# Patient Record
Sex: FEMALE | Race: WHITE | Employment: UNEMPLOYED | URBAN - NONMETROPOLITAN AREA
[De-identification: names, ages, dates, MRNs, and addresses within clinical notes are randomized per-mention and may not be internally consistent; named-entity substitution may affect disease eponyms.]

---

## 2017-03-22 ENCOUNTER — OFFICE VISIT (OUTPATIENT)
Dept: ONCOLOGY | Age: 72
End: 2017-03-22
Payer: MEDICARE

## 2017-03-22 VITALS
HEIGHT: 64 IN | DIASTOLIC BLOOD PRESSURE: 70 MMHG | WEIGHT: 194.2 LBS | BODY MASS INDEX: 33.16 KG/M2 | HEART RATE: 76 BPM | SYSTOLIC BLOOD PRESSURE: 116 MMHG | TEMPERATURE: 97.1 F

## 2017-03-22 DIAGNOSIS — C50.912 MALIGNANT NEOPLASM OF LEFT FEMALE BREAST, UNSPECIFIED SITE OF BREAST: Primary | ICD-10-CM

## 2017-03-22 DIAGNOSIS — C50.212 MALIGNANT NEOPLASM OF UPPER-INNER QUADRANT OF LEFT FEMALE BREAST (HCC): Primary | ICD-10-CM

## 2017-03-22 DIAGNOSIS — D47.2 MGUS (MONOCLONAL GAMMOPATHY OF UNKNOWN SIGNIFICANCE): ICD-10-CM

## 2017-03-22 PROCEDURE — 99214 OFFICE O/P EST MOD 30 MIN: CPT | Performed by: INTERNAL MEDICINE

## 2017-04-07 ENCOUNTER — HOSPITAL ENCOUNTER (OUTPATIENT)
Age: 72
Setting detail: SPECIMEN
Discharge: HOME | End: 2017-04-07
Payer: MEDICARE

## 2017-04-07 LAB
FREE KAPPA/LAMBDA RATIO: 0.45 (ref 0.26–1.65)
IGA: 120 MG/DL (ref 70–400)
IGG: 1400 MG/DL (ref 700–1600)
IGM: 127 MG/DL (ref 40–230)
KAPPA FREE LIGHT CHAINS QNT: 1.61 MG/DL (ref 0.37–1.94)
LAMBDA FREE LIGHT CHAINS QNT: 3.58 MG/DL (ref 0.57–2.63)

## 2017-04-10 LAB
ALBUMIN (CALCULATED): 4.9 G/DL (ref 3.2–5.2)
ALBUMIN PERCENT: 65 % (ref 45–65)
ALPHA 1 PERCENT: 2 % (ref 3–6)
ALPHA 2 PERCENT: 10 % (ref 6–13)
ALPHA-1-GLOBULIN: 0.1 G/DL (ref 0.1–0.4)
ALPHA-2-GLOBULIN: 0.7 G/DL (ref 0.5–0.9)
BETA GLOBULIN: 0.8 G/DL (ref 0.7–1.4)
BETA PERCENT: 10 % (ref 11–19)
GAMMA GLOBULIN %: 14 % (ref 9–20)
GAMMA GLOBULIN: 1 G/DL (ref 0.5–1.5)
PATHOLOGIST: ABNORMAL
PROTEIN ELECTROPHORESIS, SERUM: ABNORMAL
TOTAL PROT. SUM,%: 101 % (ref 98–102)
TOTAL PROT. SUM: 7.5 G/DL (ref 6.3–8.2)
TOTAL PROTEIN: 7.5 G/DL (ref 6.4–8.3)

## 2017-04-14 ENCOUNTER — OFFICE VISIT (OUTPATIENT)
Dept: PRIMARY CARE CLINIC | Age: 72
End: 2017-04-14
Payer: MEDICARE

## 2017-04-14 VITALS
TEMPERATURE: 97.5 F | WEIGHT: 195.8 LBS | HEART RATE: 74 BPM | HEIGHT: 63 IN | SYSTOLIC BLOOD PRESSURE: 112 MMHG | DIASTOLIC BLOOD PRESSURE: 60 MMHG | BODY MASS INDEX: 34.69 KG/M2

## 2017-04-14 DIAGNOSIS — L08.9 TOE INFECTION: ICD-10-CM

## 2017-04-14 DIAGNOSIS — L03.031 CELLULITIS OF TOE OF RIGHT FOOT: Primary | ICD-10-CM

## 2017-04-14 PROCEDURE — 99213 OFFICE O/P EST LOW 20 MIN: CPT | Performed by: NURSE PRACTITIONER

## 2017-04-14 RX ORDER — DOXYCYCLINE HYCLATE 100 MG/1
100 CAPSULE ORAL 2 TIMES DAILY
Qty: 20 CAPSULE | Refills: 0 | Status: SHIPPED | OUTPATIENT
Start: 2017-04-14 | End: 2017-04-27 | Stop reason: SDUPTHER

## 2017-04-14 ASSESSMENT — ENCOUNTER SYMPTOMS
WHEEZING: 0
COUGH: 0
SHORTNESS OF BREATH: 0

## 2017-04-27 ENCOUNTER — OFFICE VISIT (OUTPATIENT)
Dept: ONCOLOGY | Age: 72
End: 2017-04-27
Payer: MEDICARE

## 2017-04-27 VITALS
BODY MASS INDEX: 33.73 KG/M2 | WEIGHT: 197.6 LBS | SYSTOLIC BLOOD PRESSURE: 116 MMHG | TEMPERATURE: 97.1 F | DIASTOLIC BLOOD PRESSURE: 68 MMHG | HEIGHT: 64 IN | HEART RATE: 80 BPM

## 2017-04-27 DIAGNOSIS — D47.2 MGUS (MONOCLONAL GAMMOPATHY OF UNKNOWN SIGNIFICANCE): Primary | ICD-10-CM

## 2017-04-27 DIAGNOSIS — C50.912 MALIGNANT NEOPLASM OF LEFT FEMALE BREAST, UNSPECIFIED SITE OF BREAST: ICD-10-CM

## 2017-04-27 PROCEDURE — 99214 OFFICE O/P EST MOD 30 MIN: CPT | Performed by: INTERNAL MEDICINE

## 2017-04-27 RX ORDER — DOXYCYCLINE HYCLATE 100 MG/1
100 CAPSULE ORAL 2 TIMES DAILY
Qty: 20 CAPSULE | Refills: 0 | Status: SHIPPED | OUTPATIENT
Start: 2017-04-27 | End: 2017-05-07

## 2017-05-09 ENCOUNTER — OFFICE VISIT (OUTPATIENT)
Dept: INTERNAL MEDICINE | Age: 72
End: 2017-05-09
Payer: MEDICARE

## 2017-05-09 VITALS
WEIGHT: 197.4 LBS | HEART RATE: 56 BPM | HEIGHT: 64 IN | BODY MASS INDEX: 33.7 KG/M2 | DIASTOLIC BLOOD PRESSURE: 58 MMHG | SYSTOLIC BLOOD PRESSURE: 112 MMHG

## 2017-05-09 DIAGNOSIS — R73.01 IFG (IMPAIRED FASTING GLUCOSE): ICD-10-CM

## 2017-05-09 DIAGNOSIS — E78.5 DYSLIPIDEMIA: ICD-10-CM

## 2017-05-09 DIAGNOSIS — E55.9 VITAMIN D DEFICIENCY: ICD-10-CM

## 2017-05-09 DIAGNOSIS — D47.2 MGUS (MONOCLONAL GAMMOPATHY OF UNKNOWN SIGNIFICANCE): ICD-10-CM

## 2017-05-09 DIAGNOSIS — F51.01 PRIMARY INSOMNIA: ICD-10-CM

## 2017-05-09 DIAGNOSIS — F32.9 CHRONIC MAJOR DEPRESSIVE DISORDER: ICD-10-CM

## 2017-05-09 DIAGNOSIS — L97.511 TOE ULCER, RIGHT, LIMITED TO BREAKDOWN OF SKIN (HCC): Primary | ICD-10-CM

## 2017-05-09 DIAGNOSIS — M85.80 OSTEOPENIA: ICD-10-CM

## 2017-05-09 DIAGNOSIS — C50.212 MALIGNANT NEOPLASM OF UPPER-INNER QUADRANT OF LEFT FEMALE BREAST (HCC): ICD-10-CM

## 2017-05-09 PROCEDURE — G8510 SCR DEP NEG, NO PLAN REQD: HCPCS | Performed by: INTERNAL MEDICINE

## 2017-05-09 PROCEDURE — 99214 OFFICE O/P EST MOD 30 MIN: CPT | Performed by: INTERNAL MEDICINE

## 2017-05-09 RX ORDER — ZALEPLON 10 MG/1
10 CAPSULE ORAL NIGHTLY PRN
Qty: 90 CAPSULE | Refills: 1 | Status: SHIPPED | OUTPATIENT
Start: 2017-05-09 | End: 2017-11-15 | Stop reason: SDUPTHER

## 2017-05-09 ASSESSMENT — PATIENT HEALTH QUESTIONNAIRE - PHQ9
SUM OF ALL RESPONSES TO PHQ9 QUESTIONS 1 & 2: 0
SUM OF ALL RESPONSES TO PHQ QUESTIONS 1-9: 0
1. LITTLE INTEREST OR PLEASURE IN DOING THINGS: 0
2. FEELING DOWN, DEPRESSED OR HOPELESS: 0

## 2017-05-09 ASSESSMENT — ENCOUNTER SYMPTOMS
BLOOD IN STOOL: 0
DOUBLE VISION: 0
WHEEZING: 0
EYE PAIN: 0
COUGH: 0
VOMITING: 0
CONSTIPATION: 0
ABDOMINAL PAIN: 0
EYE DISCHARGE: 0
BLURRED VISION: 0
DIARRHEA: 0
NAUSEA: 0
SORE THROAT: 0
SHORTNESS OF BREATH: 0

## 2017-05-10 ENCOUNTER — OFFICE VISIT (OUTPATIENT)
Dept: PODIATRY | Age: 72
End: 2017-05-10
Payer: MEDICARE

## 2017-05-10 VITALS
BODY MASS INDEX: 33.7 KG/M2 | HEIGHT: 64 IN | SYSTOLIC BLOOD PRESSURE: 122 MMHG | TEMPERATURE: 97.6 F | HEART RATE: 68 BPM | WEIGHT: 197.4 LBS | DIASTOLIC BLOOD PRESSURE: 70 MMHG

## 2017-05-10 DIAGNOSIS — M20.61 DEFORMITY, TOE ACQUIRED, RIGHT: ICD-10-CM

## 2017-05-10 DIAGNOSIS — L97.511 ULCER OF TOE, RIGHT, LIMITED TO BREAKDOWN OF SKIN (HCC): ICD-10-CM

## 2017-05-10 DIAGNOSIS — G62.9 PERIPHERAL POLYNEUROPATHY: Primary | ICD-10-CM

## 2017-05-10 PROCEDURE — 99213 OFFICE O/P EST LOW 20 MIN: CPT | Performed by: PODIATRIST

## 2017-11-01 ENCOUNTER — HOSPITAL ENCOUNTER (OUTPATIENT)
Dept: LAB | Age: 72
Setting detail: SPECIMEN
Discharge: HOME | End: 2017-11-01
Payer: MEDICARE

## 2017-11-01 ENCOUNTER — OFFICE VISIT (OUTPATIENT)
Dept: ONCOLOGY | Age: 72
End: 2017-11-01
Payer: MEDICARE

## 2017-11-01 VITALS
DIASTOLIC BLOOD PRESSURE: 66 MMHG | TEMPERATURE: 95.7 F | BODY MASS INDEX: 31.79 KG/M2 | HEIGHT: 64 IN | HEART RATE: 50 BPM | SYSTOLIC BLOOD PRESSURE: 118 MMHG | OXYGEN SATURATION: 95 % | WEIGHT: 186.2 LBS

## 2017-11-01 DIAGNOSIS — D47.2 MGUS (MONOCLONAL GAMMOPATHY OF UNKNOWN SIGNIFICANCE): ICD-10-CM

## 2017-11-01 DIAGNOSIS — Z17.0 MALIGNANT NEOPLASM OF UPPER-INNER QUADRANT OF LEFT BREAST IN FEMALE, ESTROGEN RECEPTOR POSITIVE (HCC): Primary | ICD-10-CM

## 2017-11-01 DIAGNOSIS — C50.212 MALIGNANT NEOPLASM OF UPPER-INNER QUADRANT OF LEFT BREAST IN FEMALE, ESTROGEN RECEPTOR POSITIVE (HCC): Primary | ICD-10-CM

## 2017-11-01 DIAGNOSIS — Z12.31 ENCOUNTER FOR SCREENING MAMMOGRAM FOR MALIGNANT NEOPLASM OF BREAST: ICD-10-CM

## 2017-11-01 LAB
ABSOLUTE EOS #: 0.1 K/UL (ref 0–0.4)
ABSOLUTE IMMATURE GRANULOCYTE: NORMAL K/UL (ref 0–0.3)
ABSOLUTE LYMPH #: 1.5 K/UL (ref 1–4.8)
ABSOLUTE MONO #: 0.6 K/UL (ref 0.1–1.2)
ALBUMIN SERPL-MCNC: 4.6 G/DL (ref 3.5–5.2)
ALBUMIN/GLOBULIN RATIO: 1.4 (ref 1–2.5)
ALP BLD-CCNC: 49 U/L (ref 35–104)
ALT SERPL-CCNC: 20 U/L (ref 5–33)
ANION GAP SERPL CALCULATED.3IONS-SCNC: 12 MMOL/L (ref 9–17)
AST SERPL-CCNC: 23 U/L
BASOPHILS # BLD: 1 % (ref 0–1)
BASOPHILS ABSOLUTE: 0.1 K/UL (ref 0–0.2)
BILIRUB SERPL-MCNC: 0.33 MG/DL (ref 0.3–1.2)
BUN BLDV-MCNC: 19 MG/DL (ref 8–23)
BUN/CREAT BLD: 29 (ref 9–20)
CALCIUM SERPL-MCNC: 9.8 MG/DL (ref 8.6–10.4)
CHLORIDE BLD-SCNC: 96 MMOL/L (ref 98–107)
CO2: 29 MMOL/L (ref 20–31)
CREAT SERPL-MCNC: 0.66 MG/DL (ref 0.5–0.9)
DIFFERENTIAL TYPE: NORMAL
EOSINOPHILS RELATIVE PERCENT: 2 % (ref 1–7)
GFR AFRICAN AMERICAN: >60 ML/MIN
GFR NON-AFRICAN AMERICAN: >60 ML/MIN
GFR SERPL CREATININE-BSD FRML MDRD: ABNORMAL ML/MIN/{1.73_M2}
GFR SERPL CREATININE-BSD FRML MDRD: ABNORMAL ML/MIN/{1.73_M2}
GLUCOSE BLD-MCNC: 99 MG/DL (ref 70–99)
HCT VFR BLD CALC: 37.8 % (ref 36–46)
HEMOGLOBIN: 12.6 G/DL (ref 12–16)
IMMATURE GRANULOCYTES: NORMAL %
LACTATE DEHYDROGENASE: 222 U/L (ref 135–214)
LYMPHOCYTES # BLD: 26 % (ref 16–46)
MCH RBC QN AUTO: 29.5 PG (ref 26–34)
MCHC RBC AUTO-ENTMCNC: 33.3 G/DL (ref 31–37)
MCV RBC AUTO: 88.4 FL (ref 80–100)
MONOCYTES # BLD: 10 % (ref 4–11)
PDW BLD-RTO: 13.5 % (ref 11–14.5)
PLATELET # BLD: 223 K/UL (ref 140–450)
PLATELET ESTIMATE: NORMAL
PMV BLD AUTO: 9.3 FL (ref 6–12)
POTASSIUM SERPL-SCNC: 4.5 MMOL/L (ref 3.7–5.3)
RBC # BLD: 4.28 M/UL (ref 4–5.2)
RBC # BLD: NORMAL 10*6/UL
SEG NEUTROPHILS: 61 % (ref 43–77)
SEGMENTED NEUTROPHILS ABSOLUTE COUNT: 3.5 K/UL (ref 1.8–7.7)
SODIUM BLD-SCNC: 137 MMOL/L (ref 135–144)
TOTAL PROTEIN: 7.9 G/DL (ref 6.4–8.3)
WBC # BLD: 5.8 K/UL (ref 3.5–11)
WBC # BLD: NORMAL 10*3/UL

## 2017-11-01 PROCEDURE — 80053 COMPREHEN METABOLIC PANEL: CPT

## 2017-11-01 PROCEDURE — 85025 COMPLETE CBC W/AUTO DIFF WBC: CPT

## 2017-11-01 PROCEDURE — 99214 OFFICE O/P EST MOD 30 MIN: CPT | Performed by: INTERNAL MEDICINE

## 2017-11-01 PROCEDURE — 83615 LACTATE (LD) (LDH) ENZYME: CPT

## 2017-11-01 PROCEDURE — 36415 COLL VENOUS BLD VENIPUNCTURE: CPT

## 2017-11-01 RX ORDER — MULTIVITAMIN WITH IRON
250 TABLET ORAL DAILY
COMMUNITY

## 2017-11-01 NOTE — PROGRESS NOTES
Judith Guo                                                                                                                  11/1/2017  MRN:   [id number]  YOB: 1945  PCP:                           Vlad Stewart MD  Referring Physician: No ref. provider found  Treating Physician Name: Anitra Kerr MD      Reason for visit:  Patient is instructed the clinic for a follow-up visit and to discuss further treatment plan. Current problems/ Active and recent treatments:  Left breast carcinoma, p T1b,p N0 ER positive HER-2 not amplified  IgG G lambda paraproteinemia    Summary of Case/History:    Judith Guo a 67 y. o.female  initially seen in consultation by Dr. Jazmine Ratliff for left-sided breast cancer. Patient underwent lumpectomy which revealed a 0.8 cm estrogen receptor positive HER-2 not amplified breast carcinoma, pT1b, pN0. Patient received radiation therapy. Subsequently she was started on anti-hormonal therapy however she had significant side effects. She was between different anti-hormonal therapy including tamoxifen however she continued to have severe side effects and subsequently decided to discontinue any anti-hormonal therapy. She discontinued anti-hormonal therapy in May 2016. Interim History:  Patient presents to the clinic for a follow-up visit to discuss further treatment plan. Patient denies any new complaints or interval events. Denies nausea vomiting fever or chills. Denies unintentional weight loss. Denies noticing any lumps or bumps in her bilateral breasts.     Past Medical History:   Past Medical History:   Diagnosis Date    Breast CA ([facility] [address]     2/15 s/p XRT & Dr Hoda Gooden following    COPD (chronic obstructive pulmonary disease) ([facility] [address] 03/04    mild obstructive defect on PFT's    Depression     Dyslipidemia     Failed 6 meds 10 yr risk 10% calc 12/15    Family hx of colon cancer     Hyperplastic colon polyp     Colonoscopy 2/16/15 with repeat recommended 2/16/2020    IFG (impaired fasting glucose) 5/9/2017    Insomnia     MGUS (monoclonal gammopathy of unknown significance)     0.64 grams/decilier IgG lambda, 04/12. 1.) UPUP negative 04/12. Stable July 2014    Osteoarthritis     particularly in the hands    Osteopenia     DEXA, 01/11/04, T -1.7 spine, T -0.2  hip 1.) T -1.6 spine, T -0.2 hip, 05/07 2.) Repeat DEXA scan, 10/09, with -1.3 spine, T -0.1 hip. 3.) DEXA, 04/12, T -1.4 spine, T -0.0 hip. 4.) Treated with Fosamax x 8 yrs, discontinued 01/12 repeat DEXA 10/14 FRAX 2.4% 10 year risk at hip    Peripheral neuropathy (HCC)     with mild to moderate sensorimotor peripheral polyneuropathy noted on EMG, 11/11    Tobacco abuse     quit 10/11    Vitamin D deficiency        Past Surgical History:     Past Surgical History:   Procedure Laterality Date    APPENDECTOMY      BREAST BIOPSY Left 11-12-14    US guided brest bx - benign    BREAST LUMPECTOMY Left 02/16/2015    with needle placement- MUCINOUS CARCINOMA- shw    COLONOSCOPY  02/28/03    normal-reed    COLONOSCOPY  10/30/98    normal-reed    COLONOSCOPY  2008    normal, family hx colon cancer- [facility]    COLONOSCOPY  2/2015    6 cm polyp-benign, repeat 5yrs- dennis    EYE SURGERY      FO[facility] 1990's, [facility]    [facility]     HYSTERECTOMY      WITH BILATERAL SALPINGO OOPHORECTOMY    KNEE ARTHROSCOPY  2011    RIGHT KNEE    LASIK Bilateral     LYMPH NODE BIOPSY Left [id number]    left sentinal node biopsy    TUBAL LIGATION  08/74       Patient Family Social History:     Social History     Social History Narrative    She works ar [employer] on [employer]. 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 She has 2 adult sons, 4 grandkids.        Current Medications:     Current Outpatient Prescriptions   Medication Sig Dispense Refill    magnesium (MAGNESIUM-OXIDE) 250 MG TABS tablet Take 250 mg by mouth daily      Cuff Size: Medium Adult)   Pulse 50   Temp 95.7 °F (35.4 °C) (Tympanic)   Ht 5' 3.5\" (1.613 m)   Wt 186 lb 3.2 oz (84.5 kg)   SpO2 95%   BMI 32.46 kg/m²   General appearance - well appearing, no in pain or distress  Mental status - AAO X3  Eyes - pupils equal and reactive, extraocular eye movements intact  Mouth - mucous membranes moist, pharynx normal without lesions  Neck - supple, no significant adenopat  Lymphatics - no palpable lymphadenopathy, no hepatosplenomegaly  Chest - clear to auscultation, no wheezes, rales or rhonchi, symmetric air entry  Heart - normal rate, regular rhythm, normal S1, S2, no murmurs  Abdomen - soft, nontender, nondistended, no masses or organomegaly  Neurological - alert, oriented, normal speech, no focal findings or movement disorder noted  Extremities - peripheral pulses normal, no pedal edema, no clubbing or cyanosis  Skin - normal coloration and turgor, no rashes, no suspicious skin lesions noted   breastbilateral breast examination was performed in the presence of a female nurse. Does not reveal any palpable mass or skin abnormality other than healed scar from previous surgery.   No lymphadenopathy noted in bilateral axilla     DATA:    CBC:   Recent Labs      11/01/17   1511   WBC  5.8   HGB  12.6   PLT  223     BMP:    Recent Labs      11/01/17   1511   NA  137   K  4.5   CL  96*   CO2  29   BUN  19   CREATININE  0.66   GLUCOSE  99     Hepatic:   Recent Labs      11/01/17   1511   AST  23   ALT  20   BILITOT  0.33   ALKPHOS  49     Results for orders placed or performed during the hospital encounter of 11/01/17   CBC Auto Differential   Result Value Ref Range    WBC 5.8 3.5 - 11.0 k/uL    RBC 4.28 4.0 - 5.2 m/uL    Hemoglobin 12.6 12.0 - 16.0 g/dL    Hematocrit 37.8 36 - 46 %    MCV 88.4 80 - 100 fL    MCH 29.5 26 - 34 pg    MCHC 33.3 31 - 37 g/dL    RDW 13.5 11.0 - 14.5 %    Platelets 354 447 - 905 k/uL    MPV 9.3 6.0 - 12.0 fL    Differential Type NOT REPORTED Immature Granulocytes NOT REPORTED 0 %    Absolute Immature Granulocyte NOT REPORTED 0.00 - 0.30 k/uL    WBC Morphology NOT REPORTED     RBC Morphology NOT REPORTED     Platelet Estimate NOT REPORTED     Seg Neutrophils 61 43 - 77 %    Lymphocytes 26 16 - 46 %    Monocytes 10 4 - 11 %    Eosinophils % 2 1 - 7 %    Basophils 1 0 - 1 %    Segs Absolute 3.50 1.8 - 7.7 k/uL    Absolute Lymph # 1.50 1.0 - 4.8 k/uL    Absolute Mono # 0.60 0.1 - 1.2 k/uL    Absolute Eos # 0.10 0.0 - 0.4 k/uL    Basophils # 0.10 0.0 - 0.2 k/uL   Comprehensive Metabolic Panel   Result Value Ref Range    Glucose 99 70 - 99 mg/dL    BUN 19 8 - 23 mg/dL    CREATININE 0.66 0.50 - 0.90 mg/dL    Bun/Cre Ratio 29 (H) 9 - 20    Calcium 9.8 8.6 - 10.4 mg/dL    Sodium 137 135 - 144 mmol/L    Potassium 4.5 3.7 - 5.3 mmol/L    Chloride 96 (L) 98 - 107 mmol/L    CO2 29 20 - 31 mmol/L    Anion Gap 12 9 - 17 mmol/L    Alkaline Phosphatase 49 35 - 104 U/L    ALT 20 5 - 33 U/L    AST 23 <32 U/L    Total Bilirubin 0.33 0.3 - 1.2 mg/dL    Total Protein 7.9 6.4 - 8.3 g/dL    Alb 4.6 3.5 - 5.2 g/dL    Albumin/Globulin Ratio 1.4 1.0 - 2.5    GFR Non-African American >60 >60 mL/min    GFR African American >60 >60 mL/min    GFR Comment          GFR Staging NOT REPORTED    Lactate Dehydrogenase   Result Value Ref Range     (H) 135 - 214 U/L       Impression:  Invasive infiltrating ductal carcinoma of left breast status post lumpectomy and adjuvant radiation therapy. Only received 1 year of anti-hormonal therapy. IgG lambda paraproteinemia    Plan:  I personally went over the results of patient's blood workup. I also discussed the natural history of early-stage hormone receptor positive breast cancer. Discussed NCCN guidelines for surveillance. patient is not interested in pursuing anti-hormonal therapy and she understands the risk. Continue annual screening mammograms. Patient was also educated on self breast examination.   Clinically at present there is no evidence of disease recurrence. Patient will also require surveillance for paraproteinemia to monitor for progression to myeloma  I will see the patient back in office in 4 months. Deepika Echols      I spent more than 25 minutes examining, evaluating, reviewing data and counseling the patient.   Greater than 50% of time was spent face-to-face with the patient

## 2017-11-13 ENCOUNTER — TELEPHONE (OUTPATIENT)
Dept: MAMMOGRAPHY | Age: 72
End: 2017-11-13

## 2017-11-13 ENCOUNTER — HOSPITAL ENCOUNTER (OUTPATIENT)
Dept: LAB | Age: 72
Setting detail: SPECIMEN
Discharge: HOME | End: 2017-11-13
Payer: MEDICARE

## 2017-11-13 DIAGNOSIS — E55.9 VITAMIN D DEFICIENCY: ICD-10-CM

## 2017-11-13 DIAGNOSIS — R73.01 IFG (IMPAIRED FASTING GLUCOSE): ICD-10-CM

## 2017-11-13 DIAGNOSIS — Z12.31 SCREENING MAMMOGRAM, ENCOUNTER FOR: Primary | ICD-10-CM

## 2017-11-13 LAB
GLUCOSE FASTING: 91 MG/DL (ref 70–99)
VITAMIN D 25-HYDROXY: 41.1 NG/ML (ref 30–100)

## 2017-11-13 PROCEDURE — 82306 VITAMIN D 25 HYDROXY: CPT

## 2017-11-13 PROCEDURE — 82947 ASSAY GLUCOSE BLOOD QUANT: CPT

## 2017-11-13 PROCEDURE — 36415 COLL VENOUS BLD VENIPUNCTURE: CPT

## 2017-11-14 ENCOUNTER — HOSPITAL ENCOUNTER (OUTPATIENT)
Dept: MAMMOGRAPHY | Age: 72
Discharge: HOME | End: 2017-11-14
Payer: MEDICARE

## 2017-11-14 DIAGNOSIS — Z12.31 ENCOUNTER FOR SCREENING MAMMOGRAM FOR MALIGNANT NEOPLASM OF BREAST: ICD-10-CM

## 2017-11-14 DIAGNOSIS — C50.212 MALIGNANT NEOPLASM OF UPPER-INNER QUADRANT OF LEFT BREAST IN FEMALE, ESTROGEN RECEPTOR POSITIVE (HCC): ICD-10-CM

## 2017-11-14 DIAGNOSIS — Z17.0 MALIGNANT NEOPLASM OF UPPER-INNER QUADRANT OF LEFT BREAST IN FEMALE, ESTROGEN RECEPTOR POSITIVE (HCC): ICD-10-CM

## 2017-11-14 DIAGNOSIS — Z12.31 SCREENING MAMMOGRAM, ENCOUNTER FOR: ICD-10-CM

## 2017-11-14 PROCEDURE — G0202 SCR MAMMO BI INCL CAD: HCPCS

## 2017-11-15 ENCOUNTER — OFFICE VISIT (OUTPATIENT)
Dept: INTERNAL MEDICINE | Age: 72
End: 2017-11-15
Payer: MEDICARE

## 2017-11-15 VITALS
HEART RATE: 60 BPM | DIASTOLIC BLOOD PRESSURE: 62 MMHG | HEIGHT: 64 IN | SYSTOLIC BLOOD PRESSURE: 128 MMHG | WEIGHT: 183 LBS | BODY MASS INDEX: 31.24 KG/M2

## 2017-11-15 DIAGNOSIS — M85.80 OSTEOPENIA, UNSPECIFIED LOCATION: ICD-10-CM

## 2017-11-15 DIAGNOSIS — C50.212 MALIGNANT NEOPLASM OF UPPER-INNER QUADRANT OF LEFT BREAST IN FEMALE, ESTROGEN RECEPTOR POSITIVE (HCC): ICD-10-CM

## 2017-11-15 DIAGNOSIS — Z00.00 ROUTINE GENERAL MEDICAL EXAMINATION AT A HEALTH CARE FACILITY: ICD-10-CM

## 2017-11-15 DIAGNOSIS — M19.042 PRIMARY OSTEOARTHRITIS OF BOTH HANDS: Primary | ICD-10-CM

## 2017-11-15 DIAGNOSIS — D47.2 MGUS (MONOCLONAL GAMMOPATHY OF UNKNOWN SIGNIFICANCE): ICD-10-CM

## 2017-11-15 DIAGNOSIS — E78.5 DYSLIPIDEMIA: ICD-10-CM

## 2017-11-15 DIAGNOSIS — F32.9 CHRONIC MAJOR DEPRESSIVE DISORDER: ICD-10-CM

## 2017-11-15 DIAGNOSIS — M19.041 PRIMARY OSTEOARTHRITIS OF BOTH HANDS: Primary | ICD-10-CM

## 2017-11-15 DIAGNOSIS — Z17.0 MALIGNANT NEOPLASM OF UPPER-INNER QUADRANT OF LEFT BREAST IN FEMALE, ESTROGEN RECEPTOR POSITIVE (HCC): ICD-10-CM

## 2017-11-15 DIAGNOSIS — E55.9 VITAMIN D DEFICIENCY: ICD-10-CM

## 2017-11-15 DIAGNOSIS — R73.01 IFG (IMPAIRED FASTING GLUCOSE): ICD-10-CM

## 2017-11-15 DIAGNOSIS — F51.01 PRIMARY INSOMNIA: ICD-10-CM

## 2017-11-15 PROCEDURE — G0439 PPPS, SUBSEQ VISIT: HCPCS | Performed by: INTERNAL MEDICINE

## 2017-11-15 PROCEDURE — 99213 OFFICE O/P EST LOW 20 MIN: CPT | Performed by: INTERNAL MEDICINE

## 2017-11-15 RX ORDER — ESCITALOPRAM OXALATE 10 MG/1
TABLET ORAL
Qty: 90 TABLET | Refills: 3 | Status: SHIPPED | OUTPATIENT
Start: 2017-11-15 | End: 2018-11-28 | Stop reason: SDUPTHER

## 2017-11-15 RX ORDER — ZALEPLON 10 MG/1
10 CAPSULE ORAL NIGHTLY PRN
Qty: 90 CAPSULE | Refills: 1 | Status: SHIPPED | OUTPATIENT
Start: 2017-11-15 | End: 2018-05-23 | Stop reason: SDUPTHER

## 2017-11-15 ASSESSMENT — PATIENT HEALTH QUESTIONNAIRE - PHQ9: SUM OF ALL RESPONSES TO PHQ QUESTIONS 1-9: 0

## 2017-11-15 ASSESSMENT — LIFESTYLE VARIABLES
AUDIT-C TOTAL SCORE: 2
HOW OFTEN DO YOU HAVE SIX OR MORE DRINKS ON ONE OCCASION: 0
HOW MANY STANDARD DRINKS CONTAINING ALCOHOL DO YOU HAVE ON A TYPICAL DAY: 0
HOW OFTEN DO YOU HAVE A DRINK CONTAINING ALCOHOL: 2

## 2017-11-15 ASSESSMENT — ANXIETY QUESTIONNAIRES: GAD7 TOTAL SCORE: 2

## 2017-11-15 NOTE — PATIENT INSTRUCTIONS
Personalized Preventive Plan for Treva Bean - 11/15/2017  Medicare offers a range of preventive health benefits. Some of the tests and screenings are paid in full while other may be subject to a deductible, co-insurance, and/or copay. Some of these benefits include a comprehensive review of your medical history including lifestyle, illnesses that may run in your family, and various assessments and screenings as appropriate. After reviewing your medical record and screening and assessments performed today your provider may have ordered immunizations, labs, imaging, and/or referrals for you. A list of these orders (if applicable) as well as your Preventive Care list are included within your After Visit Summary for your review. Other Preventive Recommendations:    · A preventive eye exam performed by an eye specialist is recommended every 1-2 years to screen for glaucoma; cataracts, macular degeneration, and other eye disorders. · A preventive dental visit is recommended every 6 months. · Try to get at least 150 minutes of exercise per week or 10,000 steps per day on a pedometer . · Order or download the FREE \"Exercise & Physical Activity: Your Everyday Guide\" from [employer]. Call 6-210.753.8766 or search [employer] online. · You need 9675-9838 mg of calcium and 1016-4658 IU of vitamin D per day. It is possible to meet your calcium requirement with diet alone, but a vitamin D supplement is usually necessary to meet this goal.  · When exposed to the sun, use a sunscreen that protects against both UVA and UVB radiation with an SPF of 30 or greater. Reapply every 2 to 3 hours or after sweating, drying off with a towel, or swimming. · Always wear a seat belt when traveling in a car. Always wear a helmet when riding a bicycle or motorcycle.

## 2017-11-15 NOTE — PROGRESS NOTES
Medicare Annual Wellness Visit  Name: Corazon Asencio Date: 11/15/2017   MRN: [id number] Sex: Female   Age: 67 y.o. Ethnicity: Non-/Non    : 1945 Race: Sonny Bae is here for Medicare AWV    Screenings for behavioral, psychosocial and functional/safety risks, and cognitive dysfunction are all negative except as indicated below. These results, as well as other patient data from the [address] form, are documented in Flowsheets linked to this Encounter. Allergies   Allergen Reactions    Amoxicillin-Pot Clavulanate     Crestor [Rosuvastatin]      MUSCLE PAIN/ACHES      Floxin [Ofloxacin]     Influenza Vaccines     Lipitor      MUSCLE PAIN/ACHES      Meloxicam Other (See Comments)     constipation    Pravastatin      MUSCLE PAIN/ACHES    Red Yeast Rice [Monascus Purpureus Went Yeast]      POORLY TOLERATED    Zetia [Ezetimibe]      CONSTIPATION      Zocor [Simvastatin]      MUSCLE PAIN/ACHES    Gabapentin Rash     Prior to Visit Medications    Medication Sig Taking?  Authorizing Provider   vitamin D (CHOLECALCIFEROL) 1000 UNIT TABS tablet Take 1,000 Units by mouth daily Yes Historical Provider, MD   magnesium (MAGNESIUM-OXIDE) 250 MG TABS tablet Take 250 mg by mouth daily Yes Historical Provider, MD   zaleplon (SONATA) 10 MG capsule Take 1 capsule by mouth nightly as needed (insomnia) Yes Carol Addison MD   escitalopram (LEXAPRO) 10 MG tablet TAKE 1 TABLET DAILY Yes Carol Addison MD   diclofenac sodium 1 % GEL Apply topically as needed Yes Historical Provider, MD   aspirin 81 MG tablet Take 81 mg by mouth daily Yes Historical Provider, MD   B Complex Vitamins (VITAMIN B COMPLEX PO) Take by mouth daily Yes Historical Provider, MD   BLACK COHOSH HOT FLASH RELIEF PO Take 1-2 tablets by mouth daily as needed Yes Historical Provider, MD   Calcium Carbonate-Vitamin D (CALCIUM 600 + D PO)   Take 1 tablet by mouth daily  Yes Historical Provider, MD     Past 74'S    Diabetes Neg Hx     Breast Cancer Neg Hx        CareTeam (Including outside providers/suppliers regularly involved in providing care):   Patient Care Team:  Chuy Perez MD as PCP - General (Internal Medicine)    Wt Readings from Last 3 Encounters:   11/15/17 183 lb (83 kg)   11/01/17 186 lb 3.2 oz (84.5 kg)   05/10/17 197 lb 6.4 oz (89.5 kg)     Vitals:    11/15/17 1542   BP: 128/62   Site: Right Arm   Position: Sitting   Cuff Size: Large Adult   Pulse: 60   Weight: 183 lb (83 kg)   Height: 5' 3.5\" (1.613 m)         Patient's complete Health Risk Assessment and screening values have been reviewed and are found in Flowsheets. The following problems were reviewed today and where indicated follow up appointments were made and/or referrals ordered. Positive Risk Factor Screenings with Interventions:     General Health:  General  In general, how would you say your health is?: Good  In the past 7 days, have you experienced any of the following?: (!) New or Increased Pain, New or Increased Fatigue, Stress, Anger  Do you get the social and emotional support that you need?: Yes  Do you have a Living Will?: Yes  General Health Risk Interventions:  · see additional progress note below    Health Habits/Nutrition:  Health Habits/Nutrition  Do you exercise for at least 20 minutes 2-3 times per week?: (!) No  Have you lost any weight without trying in the past 3 months?: No  Do you eat fewer than 2 meals per day?: No  Have you seen a dentist within the past year?: Yes  Body mass index is 31.91 kg/m².   Health Habits/Nutrition Interventions:  · Inadequate physical activity:  patient is not ready to increase his/her physical activity level at this time    Hearing/Vision:  Hearing/Vision  Do you or your family notice any trouble with your hearing?: No  Do you have difficulty driving, watching TV, or doing any of your daily activities because of your eyesight?: (!) Yes  Have you had an eye exam within the past year?: (!) No  Hearing/Vision Interventions:  · Vision concerns:  patient encouraged to make appointment with his/her eye specialist    Safety:  Safety  Do you have working smoke detectors?: Yes  Have all throw rugs been removed or fastened?: (!) No  Do you have non-slip mats in all bathtubs?: (!) No  Do all of your stairways have a railing or banister?: (!) No  Are your doorways, halls and stairs free of clutter?: Yes  Do you always fasten your seatbelt when you are in a car?: (!) No  Safety Interventions:  · Home safety tips provided      Personalized Preventive Plan   Current Health Maintenance Status  Immunization History   Administered Date(s) Administered    Pneumococcal 13-valent Conjugate ([id number]) 05/13/2015    Pneumococcal Polysaccharide ([id number]) 05/09/2007, 11/06/2013    Tdap (Boostrix, Adacel) 07/30/2014    Zoster 11/06/2012        Health Maintenance   Topic Date Due    Breast cancer screen  11/11/2018    Colon cancer screen colonoscopy  02/16/2020    Lipid screen  05/03/2022    DTaP/Tdap/Td vaccine (2 - Td) 07/30/2024    Zostavax vaccine  Completed    DEXA (modify frequency per FRAX score)  Completed    Pneumococcal low/med risk  Completed     Recommendations for Preventive Services Due: see orders.   Recommended screening schedule for the next 5-10 years is provided to the patient in written form: see Patient Instructions/AVS.

## 2017-11-16 NOTE — PROGRESS NOTES
Joesph Rothman  YOB: 1945    Date of Service:  11/15/2017      HPI:  Raimundo Peralta was seen in the internal medicine office today for   Chief Complaint   Patient presents with    Medicare AWV    Hyperlipidemia    Blood Sugar Problem    Depression      · and continued evaluation and management of chronic medical problems. 1.  She has actually done pretty well over the last 6 months. She sold a piece of property up at [facility]. That was a lot of stress but she decided to spend the money to take quite a bit of her family to [facility] and they are going to leave in a couple weeks. This has been a lot of work and a lot of stress because a couple of her grandkids are not getting along but she is very excited about the trip and seems happy about it. She has had some anxiety though and stress and she is a little angry with some of her family members but nonetheless she feels it is overall pretty positive. She has had some fatigue but she does not feel that this is new and does not seem to be worsened. She has significant arthritic complaints though that she needed on her annual wellness exam. This is primarily her hands, particularly the thumbs, but also the left second digit and wonders about straightening the DIP on that left second finger and we talked about a hand specialist.      2.  The ulcer on the right toe has healed up. We reviewed the x-rays with the old fracture which she believes relates to multiple foot surgeries she tells me she had on that second toe somewhat remotely. 3.  We talked about the importance of diet as it relates to her sugar. We reviewed the sugar and the cholesterol issue. 4.  We talked about her vitamin D.    5.  We discussed her breast cancer and the MGUS and the follow up with the oncologist.    6.  We talked about her mood and she seems to be pretty stable there on the SSRI.     Review of Systems:  Constitutional:  Negative for chills, fever, and weight loss. HENT:  Negative for congestion, ear pain, and sore throat. Eyes:  Negative for blurred vision, double vision, pain and discharge. Respiratory:  Negative for cough, shortness of breath, and wheezing. Cardiovascular:  Negative for chest pain, palpitations, and PND. Gastrointestinal:  Negative for abdominal pain, blood in stool, constipation, diarrhea, nausea and vomiting. Genitourinary:  Negative for dysuria, frequency, and hematuria. Musculoskeletal:  Negative for myalgias. Positive for joint pain. Skin:  Negative for rash. Neurological:  Negative for tingling, sensory change, speech change, focal weakness, seizures, and headaches. Endo/Heme/Allergies:  Does not bruise/bleed easily. Psychiatric/Behavioral:  Negative for hallucinations and suicidal ideas. Patient Active Problem List   Diagnosis    Insomnia    Dyslipidemia    Osteoarthritis    Menopausal symptoms    Osteopenia    COPD (chronic obstructive pulmonary disease) (HCC)    MGUS (monoclonal gammopathy of unknown significance)    Peripheral neuropathy ([facility]    Vitamin D deficiency    Breast CA (HCC)    Hallux limitus of right foot    Inflammation of foot joint    Chronic major depressive disorder    Primary osteoarthritis of both hands    IFG (impaired fasting glucose)       Medications:    Current Outpatient Prescriptions   Medication Sig Dispense Refill    vitamin D (CHOLECALCIFEROL) 1000 UNIT TABS tablet Take 1,000 Units by mouth daily      zaleplon (SONATA) 10 MG capsule Take 1 capsule by mouth nightly as needed (insomnia) .  90 capsule 1    diclofenac sodium 1 % GEL Apply topically as needed for Pain 1 Tube 1    escitalopram (LEXAPRO) 10 MG tablet TAKE 1 TABLET DAILY 90 tablet 3    Handicap Placard MISC by Does not apply route EXP: 2 years 1 each 0    magnesium (MAGNESIUM-OXIDE) 250 MG TABS tablet Take 250 mg by mouth daily      aspirin 81 MG tablet Take 81 mg by mouth daily      B Complex Vitamins (VITAMIN B COMPLEX PO) Take by mouth daily      BLACK COHOSH HOT FLASH RELIEF PO Take 1-2 tablets by mouth daily as needed      Calcium Carbonate-Vitamin D (CALCIUM 600 + D PO)   Take 1 tablet by mouth daily        No current facility-administered medications for this visit. Past Medical History:        Diagnosis Date    Breast CA ([facility] [address]     2/15 s/p XRT & Dr Frost Early following    COPD (chronic obstructive pulmonary disease) ([facility] [address] 03/04    mild obstructive defect on PFT's    Depression     Dyslipidemia     Failed 6 meds 10 yr risk 10% calc 12/15    Family hx of colon cancer     Hyperplastic colon polyp     Colonoscopy 2/16/15 with repeat recommended 2/16/2020    IFG (impaired fasting glucose) 5/9/2017    Insomnia     MGUS (monoclonal gammopathy of unknown significance)     0.64 grams/decilier IgG lambda, 04/12. 1.) UPUP negative 04/12. Stable July 2014    Osteoarthritis     particularly in the hands    Osteopenia     DEXA, 01/11/04, T -1.7 spine, T -0.2  hip 1.) T -1.6 spine, T -0.2 hip, 05/07 2.) Repeat DEXA scan, 10/09, with -1.3 spine, T -0.1 hip. 3.) DEXA, 04/12, T -1.4 spine, T -0.0 hip. 4.) Treated with Fosamax x 8 yrs, discontinued 01/12 repeat DEXA 10/14 FRAX 2.4% 10 year risk at hip    Peripheral neuropathy (HCC)     with mild to moderate sensorimotor peripheral polyneuropathy noted on EMG, 11/11    Tobacco abuse     quit 10/11    Vitamin D deficiency        Family Hx & Social HX    family history includes Cancer in her mother; Heart Attack in her father. History   Smoking Status    Former Smoker    Packs/day: 0.50    Quit date: 5/1/2014   Smokeless Tobacco    Never Used     Comment: quit 10/11 - smokes some days     History   Alcohol Use    0.0 oz/week     Comment: 1 drink a week       Physical Examination:  Constitutional:  She appears well-developed and well-nourished. No distress. HENT:  Head: Normocephalic and atraumatic.   Right Ear:  External ear normal.  Left Ear: External ear normal.  Nose:  Nose normal.  Mouth/Throat:  Oropharynx is clear and moist.  Eyes: Conjunctivae and EOM are normal.  Pupils are equal, round, and reactive to light. Right eye exhibits no discharge. Left eye exhibits no discharge. No scleral icterus. Neck:  Normal range of motion. Neck supple. No JVD present. No tracheal deviation present. No thyromegaly present. Cardiovascular:  Normal rate, normal heart sounds, and intact distal pulses. Exam reveals no gallop. Pulmonary/Chest:  Effort normal and breath sounds normal.  No respiratory distress. She has no wheezes. She has no rales. Abdominal:  Soft. Normal aorta and bowel sounds are normal.  She exhibits no distension and no mass. There is no hepatosplenomegaly. There is no tenderness. There is no rebound and no guarding. Musculoskeletal:  She exhibits no edema or tenderness. Hands: She does have deviation of the tip of the left 2nd digit with deformity of that DIP. Lymphadenopathy:    She has no cervical adenopathy. Neurological:  She is alert. She has normal strength. She displays normal reflexes. No cranial nerve deficit or sensory deficit. She exhibits normal muscle tone. Skin:  Skin is warm and dry. No rash noted. She is not diaphoretic. No pallor. No suspicious skin lesions. Psychiatric:  She has a normal mood and affect. Her behavior is normal.  Judgment normal.  Vitals reviewed. Vitals:    11/15/17 1542   BP: 128/62   Site: Right Arm   Position: Sitting   Cuff Size: Large Adult   Pulse: 60   Weight: 183 lb (83 kg)   Height: 5' 3.5\" (1.613 m)     Body mass index is 31.91 kg/m².      Wt Readings from Last 3 Encounters:   11/15/17 183 lb (83 kg)   11/01/17 186 lb 3.2 oz (84.5 kg)   05/10/17 197 lb 6.4 oz (89.5 kg)     BP Readings from Last 3 Encounters:   11/15/17 128/62   11/01/17 118/66   05/10/17 122/70         Lab Results   Component Value Date    K 4.5 11/01/2017    CREATININE 0.66 11/01/2017    AST 23 11/01/2017    ALT 20 11/01/2017    TSH 2.91 12/30/2015    HCT 37.8 11/01/2017    GLUCOSE 99 11/01/2017    MG 2.4 12/30/2015    CALCIUM 9.8 11/01/2017    FUGDEJJN52 316 11/09/2011    VITD25 41.1 11/13/2017      Lab Results   Component Value Date    CHOL 267 05/03/2017    TRIG 283 05/03/2017    HDL 60 05/03/2017    LDLCHOLESTEROL 150 05/03/2017       Assessment/Plan:  1. Primary osteoarthritis of both hands  She may benefit from evaluation by an orthopedic hand specialist.  She is going to give this some thought. 2. Primary insomnia  Stable. Medication refilled. - zaleplon (SONATA) 10 MG capsule; Take 1 capsule by mouth nightly as needed (insomnia) . Dispense: 90 capsule; Refill: 1    3. Dyslipidemia  Working on diet. She has not tolerated multiple medications. She has made some progress here. 4. Osteopenia, unspecified location  DEXA looked good 05/2017. Try to get weightbearing exercise. We will follow this. 5. Malignant neoplasm of upper-inner quadrant of left breast in female, estrogen receptor positive ([facility] [address]  6. MGUS (monoclonal gammopathy of unknown significance)  Following with Oncology. Stable. 7. Vitamin D deficiency  Continue supplement. Level looked good just before this visit. Reviewed. 8. Chronic major depressive disorder  Continue SSRI. Stable. 9. IFG (impaired fasting glucose)  Continue to work on diet. Will continue to monitor.  - Basic Metabolic Panel; Future    10. Routine general medical examination at a health care facility    She will call if any problems prior to return. Orders Placed This Encounter   Medications    zaleplon (SONATA) 10 MG capsule     Sig: Take 1 capsule by mouth nightly as needed (insomnia) .      Dispense:  90 capsule     Refill:  1    diclofenac sodium 1 % GEL     Sig: Apply topically as needed for Pain     Dispense:  1 Tube     Refill:  1    escitalopram (LEXAPRO) 10 MG tablet     Sig: TAKE 1 TABLET DAILY     Dispense:  90 tablet     Refill:

## 2018-04-10 ENCOUNTER — OFFICE VISIT (OUTPATIENT)
Dept: OPTOMETRY | Age: 73
End: 2018-04-10
Payer: COMMERCIAL

## 2018-04-10 DIAGNOSIS — H52.03 HYPEROPIA OF BOTH EYES WITH ASTIGMATISM AND PRESBYOPIA: Primary | ICD-10-CM

## 2018-04-10 DIAGNOSIS — H52.4 HYPEROPIA OF BOTH EYES WITH ASTIGMATISM AND PRESBYOPIA: Primary | ICD-10-CM

## 2018-04-10 DIAGNOSIS — Z98.890 S/P LASIK (LASER ASSISTED IN SITU KERATOMILEUSIS): ICD-10-CM

## 2018-04-10 DIAGNOSIS — H52.203 HYPEROPIA OF BOTH EYES WITH ASTIGMATISM AND PRESBYOPIA: Primary | ICD-10-CM

## 2018-04-10 PROCEDURE — 92014 COMPRE OPH EXAM EST PT 1/>: CPT | Performed by: OPTOMETRIST

## 2018-04-10 RX ORDER — BENOXINATE HCL/FLUORESCEIN SOD 0.4%-0.25%
1 DROPS OPHTHALMIC (EYE) ONCE
Status: COMPLETED | OUTPATIENT
Start: 2018-04-10 | End: 2018-04-10

## 2018-04-10 RX ADMIN — Medication 1 DROP: at 14:18

## 2018-04-10 ASSESSMENT — REFRACTION_MANIFEST
OS_SPHERE: +1.75
OS_AXIS: 080
OS_CYLINDER: -1.25
OD_SPHERE: +2.00
OS_ADD: +2.50
OD_ADD: +2.50
OD_CYLINDER: -1.25
OD_AXIS: 098

## 2018-04-10 ASSESSMENT — KERATOMETRY
OD_K2POWER_DIOPTERS: 42.75
OD_AXISANGLE2_DEGREES: 133
OS_AXISANGLE_DEGREES: 106
OS_AXISANGLE2_DEGREES: 016
OD_K1POWER_DIOPTERS: 42.50
OD_AXISANGLE_DEGREES: 043
OS_K2POWER_DIOPTERS: 43.25
OS_K1POWER_DIOPTERS: 42.75

## 2018-04-10 ASSESSMENT — TONOMETRY
IOP_METHOD: APPLANATION W FLURESS DROP
OD_IOP_MMHG: 16
OS_IOP_MMHG: 16

## 2018-04-10 ASSESSMENT — VISUAL ACUITY
CORRECTION_TYPE: GLASSES
OS_CC: 20/20
OD_CC: 20/30 OU
METHOD: SNELLEN - LINEAR
OS_CC+: -2

## 2018-04-10 ASSESSMENT — REFRACTION_WEARINGRX
OD_CYLINDER: -1.25
OD_ADD: +2.50
OS_ADD: +2.50
OS_CYLINDER: -1.50
OD_SPHERE: +1.75
OD_AXIS: 092
OS_AXIS: 082
SPECS_TYPE: PAL
OS_SPHERE: +1.50

## 2018-04-10 ASSESSMENT — SLIT LAMP EXAM - LIDS
COMMENTS: NORMAL
COMMENTS: NORMAL

## 2018-05-21 ENCOUNTER — HOSPITAL ENCOUNTER (OUTPATIENT)
Dept: LAB | Age: 73
Setting detail: SPECIMEN
Discharge: HOME | End: 2018-05-21
Payer: MEDICARE

## 2018-05-21 DIAGNOSIS — R73.01 IFG (IMPAIRED FASTING GLUCOSE): ICD-10-CM

## 2018-05-21 LAB
ANION GAP SERPL CALCULATED.3IONS-SCNC: 8 MMOL/L (ref 9–17)
BUN BLDV-MCNC: 20 MG/DL (ref 8–23)
BUN/CREAT BLD: 30 (ref 9–20)
CALCIUM SERPL-MCNC: 9.5 MG/DL (ref 8.6–10.4)
CHLORIDE BLD-SCNC: 102 MMOL/L (ref 98–107)
CO2: 29 MMOL/L (ref 20–31)
CREAT SERPL-MCNC: 0.66 MG/DL (ref 0.5–0.9)
GFR AFRICAN AMERICAN: >60 ML/MIN
GFR NON-AFRICAN AMERICAN: >60 ML/MIN
GFR SERPL CREATININE-BSD FRML MDRD: ABNORMAL ML/MIN/{1.73_M2}
GFR SERPL CREATININE-BSD FRML MDRD: ABNORMAL ML/MIN/{1.73_M2}
GLUCOSE BLD-MCNC: 95 MG/DL (ref 70–99)
POTASSIUM SERPL-SCNC: 5 MMOL/L (ref 3.7–5.3)
SODIUM BLD-SCNC: 139 MMOL/L (ref 135–144)

## 2018-05-21 PROCEDURE — 80048 BASIC METABOLIC PNL TOTAL CA: CPT

## 2018-05-21 PROCEDURE — 36415 COLL VENOUS BLD VENIPUNCTURE: CPT

## 2018-05-23 ENCOUNTER — OFFICE VISIT (OUTPATIENT)
Dept: INTERNAL MEDICINE | Age: 73
End: 2018-05-23
Payer: MEDICARE

## 2018-05-23 ENCOUNTER — NURSE ONLY (OUTPATIENT)
Dept: LAB | Age: 73
End: 2018-05-23

## 2018-05-23 VITALS
DIASTOLIC BLOOD PRESSURE: 44 MMHG | SYSTOLIC BLOOD PRESSURE: 102 MMHG | HEART RATE: 64 BPM | BODY MASS INDEX: 32.95 KG/M2 | HEIGHT: 64 IN | WEIGHT: 193 LBS

## 2018-05-23 DIAGNOSIS — C50.212 MALIGNANT NEOPLASM OF UPPER-INNER QUADRANT OF LEFT BREAST IN FEMALE, ESTROGEN RECEPTOR POSITIVE (HCC): ICD-10-CM

## 2018-05-23 DIAGNOSIS — Z23 NEED FOR VACCINATION: Primary | ICD-10-CM

## 2018-05-23 DIAGNOSIS — Z17.0 MALIGNANT NEOPLASM OF UPPER-INNER QUADRANT OF LEFT BREAST IN FEMALE, ESTROGEN RECEPTOR POSITIVE (HCC): ICD-10-CM

## 2018-05-23 DIAGNOSIS — M19.042 PRIMARY OSTEOARTHRITIS OF BOTH HANDS: ICD-10-CM

## 2018-05-23 DIAGNOSIS — F32.9 CHRONIC MAJOR DEPRESSIVE DISORDER: ICD-10-CM

## 2018-05-23 DIAGNOSIS — R73.01 IFG (IMPAIRED FASTING GLUCOSE): ICD-10-CM

## 2018-05-23 DIAGNOSIS — F51.01 PRIMARY INSOMNIA: ICD-10-CM

## 2018-05-23 DIAGNOSIS — M19.041 PRIMARY OSTEOARTHRITIS OF BOTH HANDS: ICD-10-CM

## 2018-05-23 DIAGNOSIS — E55.9 VITAMIN D DEFICIENCY: ICD-10-CM

## 2018-05-23 DIAGNOSIS — D47.2 MGUS (MONOCLONAL GAMMOPATHY OF UNKNOWN SIGNIFICANCE): ICD-10-CM

## 2018-05-23 DIAGNOSIS — E78.5 DYSLIPIDEMIA: Primary | ICD-10-CM

## 2018-05-23 DIAGNOSIS — M85.80 OSTEOPENIA, UNSPECIFIED LOCATION: ICD-10-CM

## 2018-05-23 PROCEDURE — 99213 OFFICE O/P EST LOW 20 MIN: CPT | Performed by: INTERNAL MEDICINE

## 2018-05-23 RX ORDER — ZALEPLON 10 MG/1
10 CAPSULE ORAL NIGHTLY PRN
Qty: 90 CAPSULE | Refills: 1 | Status: SHIPPED | OUTPATIENT
Start: 2018-05-23 | End: 2018-11-19

## 2018-06-06 ENCOUNTER — OFFICE VISIT (OUTPATIENT)
Dept: ONCOLOGY | Age: 73
End: 2018-06-06
Payer: MEDICARE

## 2018-06-06 VITALS
BODY MASS INDEX: 32.4 KG/M2 | DIASTOLIC BLOOD PRESSURE: 76 MMHG | HEART RATE: 74 BPM | WEIGHT: 189.8 LBS | HEIGHT: 64 IN | SYSTOLIC BLOOD PRESSURE: 126 MMHG | TEMPERATURE: 97.1 F | RESPIRATION RATE: 16 BRPM

## 2018-06-06 DIAGNOSIS — M85.80 OSTEOPENIA, UNSPECIFIED LOCATION: ICD-10-CM

## 2018-06-06 DIAGNOSIS — N95.1 MENOPAUSAL SYMPTOMS: ICD-10-CM

## 2018-06-06 DIAGNOSIS — D47.2 MGUS (MONOCLONAL GAMMOPATHY OF UNKNOWN SIGNIFICANCE): ICD-10-CM

## 2018-06-06 DIAGNOSIS — C50.212 MALIGNANT NEOPLASM OF UPPER-INNER QUADRANT OF LEFT BREAST IN FEMALE, ESTROGEN RECEPTOR POSITIVE (HCC): Primary | ICD-10-CM

## 2018-06-06 DIAGNOSIS — G62.9 PERIPHERAL POLYNEUROPATHY: ICD-10-CM

## 2018-06-06 DIAGNOSIS — Z17.0 MALIGNANT NEOPLASM OF UPPER-INNER QUADRANT OF LEFT BREAST IN FEMALE, ESTROGEN RECEPTOR POSITIVE (HCC): Primary | ICD-10-CM

## 2018-06-06 PROCEDURE — 99214 OFFICE O/P EST MOD 30 MIN: CPT | Performed by: INTERNAL MEDICINE

## 2018-11-20 ENCOUNTER — HOSPITAL ENCOUNTER (OUTPATIENT)
Dept: MAMMOGRAPHY | Age: 73
Discharge: HOME | End: 2018-11-22
Payer: MEDICARE

## 2018-11-20 ENCOUNTER — HOSPITAL ENCOUNTER (OUTPATIENT)
Dept: GENERAL RADIOLOGY | Age: 73
Discharge: HOME | End: 2018-11-22
Payer: MEDICARE

## 2018-11-20 DIAGNOSIS — Z12.31 ENCOUNTER FOR SCREENING MAMMOGRAM FOR MALIGNANT NEOPLASM OF BREAST: ICD-10-CM

## 2018-11-20 DIAGNOSIS — Z17.0 MALIGNANT NEOPLASM OF UPPER-INNER QUADRANT OF LEFT BREAST IN FEMALE, ESTROGEN RECEPTOR POSITIVE (HCC): ICD-10-CM

## 2018-11-20 DIAGNOSIS — C50.212 MALIGNANT NEOPLASM OF UPPER-INNER QUADRANT OF LEFT BREAST IN FEMALE, ESTROGEN RECEPTOR POSITIVE (HCC): ICD-10-CM

## 2018-11-20 DIAGNOSIS — M54.40 BILATERAL LOW BACK PAIN WITH SCIATICA, SCIATICA LATERALITY UNSPECIFIED, UNSPECIFIED CHRONICITY: ICD-10-CM

## 2018-11-20 PROCEDURE — 72100 X-RAY EXAM L-S SPINE 2/3 VWS: CPT

## 2018-11-20 PROCEDURE — 77063 BREAST TOMOSYNTHESIS BI: CPT

## 2018-11-21 ENCOUNTER — HOSPITAL ENCOUNTER (OUTPATIENT)
Dept: LAB | Age: 73
Discharge: HOME | End: 2018-11-21
Payer: MEDICARE

## 2018-11-21 DIAGNOSIS — E55.9 VITAMIN D DEFICIENCY: ICD-10-CM

## 2018-11-21 DIAGNOSIS — R73.01 IFG (IMPAIRED FASTING GLUCOSE): ICD-10-CM

## 2018-11-21 DIAGNOSIS — E78.5 DYSLIPIDEMIA: ICD-10-CM

## 2018-11-21 LAB
ANION GAP SERPL CALCULATED.3IONS-SCNC: 13 MMOL/L (ref 9–17)
BUN BLDV-MCNC: 27 MG/DL (ref 8–23)
BUN/CREAT BLD: 33 (ref 9–20)
CALCIUM SERPL-MCNC: 9.9 MG/DL (ref 8.6–10.4)
CHLORIDE BLD-SCNC: 98 MMOL/L (ref 98–107)
CHOLESTEROL/HDL RATIO: 3.2
CHOLESTEROL: 275 MG/DL
CO2: 26 MMOL/L (ref 20–31)
CREAT SERPL-MCNC: 0.81 MG/DL (ref 0.5–0.9)
ESTIMATED AVERAGE GLUCOSE: 123 MG/DL
GFR AFRICAN AMERICAN: >60 ML/MIN
GFR NON-AFRICAN AMERICAN: >60 ML/MIN
GFR SERPL CREATININE-BSD FRML MDRD: ABNORMAL ML/MIN/{1.73_M2}
GFR SERPL CREATININE-BSD FRML MDRD: ABNORMAL ML/MIN/{1.73_M2}
GLUCOSE BLD-MCNC: 96 MG/DL (ref 70–99)
HBA1C MFR BLD: 5.9 % (ref 4.8–5.9)
HDLC SERPL-MCNC: 85 MG/DL
LDL CHOLESTEROL: 170 MG/DL (ref 0–130)
POTASSIUM SERPL-SCNC: 5 MMOL/L (ref 3.7–5.3)
SODIUM BLD-SCNC: 137 MMOL/L (ref 135–144)
TRIGL SERPL-MCNC: 100 MG/DL
VLDLC SERPL CALC-MCNC: ABNORMAL MG/DL (ref 1–30)

## 2018-11-21 PROCEDURE — 83036 HEMOGLOBIN GLYCOSYLATED A1C: CPT

## 2018-11-21 PROCEDURE — 82306 VITAMIN D 25 HYDROXY: CPT

## 2018-11-21 PROCEDURE — 80061 LIPID PANEL: CPT

## 2018-11-21 PROCEDURE — 36415 COLL VENOUS BLD VENIPUNCTURE: CPT

## 2018-11-21 PROCEDURE — 80048 BASIC METABOLIC PNL TOTAL CA: CPT

## 2018-11-22 LAB — VITAMIN D 25-HYDROXY: 44.5 NG/ML (ref 30–100)

## 2018-11-28 ENCOUNTER — OFFICE VISIT (OUTPATIENT)
Dept: INTERNAL MEDICINE | Age: 73
End: 2018-11-28
Payer: MEDICARE

## 2018-11-28 VITALS
SYSTOLIC BLOOD PRESSURE: 134 MMHG | HEIGHT: 64 IN | BODY MASS INDEX: 33.63 KG/M2 | HEART RATE: 68 BPM | DIASTOLIC BLOOD PRESSURE: 60 MMHG | WEIGHT: 197 LBS

## 2018-11-28 DIAGNOSIS — F32.9 CHRONIC MAJOR DEPRESSIVE DISORDER: ICD-10-CM

## 2018-11-28 DIAGNOSIS — M70.61 TROCHANTERIC BURSITIS OF BOTH HIPS: ICD-10-CM

## 2018-11-28 DIAGNOSIS — Z92.89: ICD-10-CM

## 2018-11-28 DIAGNOSIS — M85.80 OSTEOPENIA, UNSPECIFIED LOCATION: ICD-10-CM

## 2018-11-28 DIAGNOSIS — Z17.0 MALIGNANT NEOPLASM OF UPPER-INNER QUADRANT OF LEFT BREAST IN FEMALE, ESTROGEN RECEPTOR POSITIVE (HCC): ICD-10-CM

## 2018-11-28 DIAGNOSIS — M70.62 TROCHANTERIC BURSITIS OF BOTH HIPS: ICD-10-CM

## 2018-11-28 DIAGNOSIS — E78.5 DYSLIPIDEMIA: ICD-10-CM

## 2018-11-28 DIAGNOSIS — D47.2 MGUS (MONOCLONAL GAMMOPATHY OF UNKNOWN SIGNIFICANCE): ICD-10-CM

## 2018-11-28 DIAGNOSIS — E55.9 VITAMIN D DEFICIENCY: ICD-10-CM

## 2018-11-28 DIAGNOSIS — M19.049 HAND ARTHRITIS: Primary | ICD-10-CM

## 2018-11-28 DIAGNOSIS — R73.01 IFG (IMPAIRED FASTING GLUCOSE): ICD-10-CM

## 2018-11-28 DIAGNOSIS — C50.212 MALIGNANT NEOPLASM OF UPPER-INNER QUADRANT OF LEFT BREAST IN FEMALE, ESTROGEN RECEPTOR POSITIVE (HCC): ICD-10-CM

## 2018-11-28 DIAGNOSIS — G47.9 SLEEP DISORDER: ICD-10-CM

## 2018-11-28 PROCEDURE — 99214 OFFICE O/P EST MOD 30 MIN: CPT | Performed by: INTERNAL MEDICINE

## 2018-11-28 RX ORDER — ESCITALOPRAM OXALATE 10 MG/1
TABLET ORAL
Qty: 90 TABLET | Refills: 3 | Status: SHIPPED | OUTPATIENT
Start: 2018-11-28 | End: 2019-02-18

## 2018-11-28 RX ORDER — ZALEPLON 10 MG/1
10 CAPSULE ORAL NIGHTLY PRN
Qty: 90 CAPSULE | Refills: 1 | Status: SHIPPED | OUTPATIENT
Start: 2018-11-28 | End: 2019-08-27 | Stop reason: SDUPTHER

## 2018-11-28 RX ORDER — IBUPROFEN 800 MG/1
800 TABLET ORAL 2 TIMES DAILY PRN
COMMUNITY
End: 2019-09-05

## 2018-11-28 RX ORDER — ZALEPLON 10 MG/1
10 CAPSULE ORAL NIGHTLY PRN
COMMUNITY
Start: 2018-10-10 | End: 2018-11-28 | Stop reason: SDUPTHER

## 2018-11-28 ASSESSMENT — PATIENT HEALTH QUESTIONNAIRE - PHQ9: DEPRESSION UNABLE TO ASSESS: PT REFUSES

## 2018-12-03 NOTE — PROGRESS NOTES
Collins Boswell  YOB: 1945    Date of Service:  11/28/2018      HPI:  Dolores Haley was seen in the internal medicine office today for:  Chief Complaint  Patient presents with  · Hand pain. · Trochanteric bursitis. · Cholesterol problem. · Blood sugar problem. · MGUS. · Vitamin D deficiency. · Sleep problem. · Depression. · and continued evaluation and management of chronic medical problems. We addressed the following new, acute or uncontrolled/unstable issues:    1. The hands are an issue. Some plain films were obtained. We discussed the results with her over the phone. The radiologist at [facility] had thought these were suggestive of inflammatory arthritis. Curiously, her son has psoriatic arthritis and that is what Dr. Rosalee Foote thought this probably was. The patient states she has never had anything to suggest psoriasis. The hands still bother her at times. They seem to wax and wane. She notes she has rather severe arthritis, but she has been told this is osteoarthritis. I reviewed the record with her. Coincidentally, she just saw Dr. Chico Smith because she has a concern about trochanteric bursitis as well and he just injected these but he would have been unaware of these x-rays, I believe. We discussed getting back in to see Dr. Chico Smith to discuss the possibility of inflammatory arthritis of her hands because she has significant symptoms there and she would be interested in doing this. Also interestingly I did some x-rays of her hands again in 2014 and those did not really suggest inflammatory arthritis. Rheumatoid factor, JAZ, and sed rate at that time were negative. She does note that the MCPs and PIPs seem to be the most involved, particularly of the 1st, 2nd, and 3rd digits. She feels the symptoms are moderate and they have not really seemed to be improving. 2.  She had some stress coming in because there was a large co-payment on her labs.   She has MGUS and I think

## 2019-01-10 ENCOUNTER — TELEPHONE (OUTPATIENT)
Dept: INTERNAL MEDICINE | Age: 74
End: 2019-01-10

## 2019-01-10 ENCOUNTER — OFFICE VISIT (OUTPATIENT)
Dept: ONCOLOGY | Age: 74
End: 2019-01-10
Payer: MEDICARE

## 2019-01-10 VITALS
TEMPERATURE: 97.4 F | SYSTOLIC BLOOD PRESSURE: 126 MMHG | DIASTOLIC BLOOD PRESSURE: 58 MMHG | BODY MASS INDEX: 34.31 KG/M2 | HEIGHT: 64 IN | WEIGHT: 201 LBS | HEART RATE: 64 BPM

## 2019-01-10 DIAGNOSIS — G62.9 PERIPHERAL POLYNEUROPATHY: ICD-10-CM

## 2019-01-10 DIAGNOSIS — D47.2 MGUS (MONOCLONAL GAMMOPATHY OF UNKNOWN SIGNIFICANCE): ICD-10-CM

## 2019-01-10 DIAGNOSIS — C50.212 MALIGNANT NEOPLASM OF UPPER-INNER QUADRANT OF LEFT BREAST IN FEMALE, ESTROGEN RECEPTOR POSITIVE (HCC): Primary | ICD-10-CM

## 2019-01-10 DIAGNOSIS — Z17.0 MALIGNANT NEOPLASM OF UPPER-INNER QUADRANT OF LEFT BREAST IN FEMALE, ESTROGEN RECEPTOR POSITIVE (HCC): Primary | ICD-10-CM

## 2019-01-10 PROCEDURE — 99214 OFFICE O/P EST MOD 30 MIN: CPT | Performed by: INTERNAL MEDICINE

## 2019-02-06 ENCOUNTER — TELEPHONE (OUTPATIENT)
Dept: LAB | Age: 74
End: 2019-02-06

## 2019-02-13 ENCOUNTER — OFFICE VISIT (OUTPATIENT)
Dept: INTERNAL MEDICINE | Age: 74
End: 2019-02-13
Payer: MEDICARE

## 2019-02-13 ENCOUNTER — NURSE ONLY (OUTPATIENT)
Dept: LAB | Age: 74
End: 2019-02-13

## 2019-02-13 VITALS
HEART RATE: 70 BPM | DIASTOLIC BLOOD PRESSURE: 80 MMHG | HEIGHT: 64 IN | SYSTOLIC BLOOD PRESSURE: 132 MMHG | BODY MASS INDEX: 34.66 KG/M2 | WEIGHT: 203 LBS

## 2019-02-13 DIAGNOSIS — Z23 NEED FOR VACCINATION: Primary | ICD-10-CM

## 2019-02-13 DIAGNOSIS — G62.9 NEUROPATHY: Primary | ICD-10-CM

## 2019-02-13 PROCEDURE — 99213 OFFICE O/P EST LOW 20 MIN: CPT | Performed by: NURSE PRACTITIONER

## 2019-02-13 RX ORDER — PREGABALIN 50 MG/1
50 CAPSULE ORAL 2 TIMES DAILY
Qty: 60 CAPSULE | Refills: 0 | Status: SHIPPED | OUTPATIENT
Start: 2019-02-13 | End: 2019-02-18

## 2019-02-15 ENCOUNTER — TELEPHONE (OUTPATIENT)
Dept: INTERNAL MEDICINE | Age: 74
End: 2019-02-15

## 2019-02-18 RX ORDER — DULOXETIN HYDROCHLORIDE 30 MG/1
30 CAPSULE, DELAYED RELEASE ORAL DAILY
Qty: 30 CAPSULE | Refills: 1 | Status: SHIPPED | OUTPATIENT
Start: 2019-02-18 | End: 2019-03-07

## 2019-03-07 ENCOUNTER — OFFICE VISIT (OUTPATIENT)
Dept: INTERNAL MEDICINE | Age: 74
End: 2019-03-07
Payer: MEDICARE

## 2019-03-07 ENCOUNTER — TELEPHONE (OUTPATIENT)
Dept: INTERNAL MEDICINE | Age: 74
End: 2019-03-07

## 2019-03-07 VITALS
HEART RATE: 66 BPM | TEMPERATURE: 97.5 F | HEIGHT: 64 IN | BODY MASS INDEX: 33.87 KG/M2 | RESPIRATION RATE: 16 BRPM | WEIGHT: 198.4 LBS | DIASTOLIC BLOOD PRESSURE: 78 MMHG | SYSTOLIC BLOOD PRESSURE: 136 MMHG

## 2019-03-07 DIAGNOSIS — F32.9 CHRONIC MAJOR DEPRESSIVE DISORDER: ICD-10-CM

## 2019-03-07 DIAGNOSIS — M79.604 RIGHT LEG PAIN: Primary | ICD-10-CM

## 2019-03-07 DIAGNOSIS — Z17.0 MALIGNANT NEOPLASM OF UPPER-INNER QUADRANT OF LEFT BREAST IN FEMALE, ESTROGEN RECEPTOR POSITIVE (HCC): Primary | ICD-10-CM

## 2019-03-07 DIAGNOSIS — C50.212 MALIGNANT NEOPLASM OF UPPER-INNER QUADRANT OF LEFT BREAST IN FEMALE, ESTROGEN RECEPTOR POSITIVE (HCC): Primary | ICD-10-CM

## 2019-03-07 DIAGNOSIS — M54.50 LUMBAR PAIN: ICD-10-CM

## 2019-03-07 DIAGNOSIS — G62.9 PERIPHERAL POLYNEUROPATHY: ICD-10-CM

## 2019-03-07 PROCEDURE — 99214 OFFICE O/P EST MOD 30 MIN: CPT | Performed by: NURSE PRACTITIONER

## 2019-03-07 RX ORDER — TRAMADOL HYDROCHLORIDE 50 MG/1
50 TABLET ORAL EVERY 8 HOURS PRN
Qty: 15 TABLET | Refills: 0 | Status: SHIPPED | OUTPATIENT
Start: 2019-03-07 | End: 2019-03-12

## 2019-03-11 ENCOUNTER — HOSPITAL ENCOUNTER (OUTPATIENT)
Dept: PHYSICAL THERAPY | Age: 74
Setting detail: THERAPIES SERIES
Discharge: HOME | End: 2019-03-11
Payer: MEDICARE

## 2019-03-11 PROCEDURE — 97110 THERAPEUTIC EXERCISES: CPT | Performed by: PHYSICAL THERAPIST

## 2019-03-11 PROCEDURE — 97161 PT EVAL LOW COMPLEX 20 MIN: CPT | Performed by: PHYSICAL THERAPIST

## 2019-03-11 ASSESSMENT — PAIN DESCRIPTION - PROGRESSION: CLINICAL_PROGRESSION: GRADUALLY WORSENING

## 2019-03-11 ASSESSMENT — PAIN DESCRIPTION - ONSET: ONSET: ON-GOING

## 2019-03-11 ASSESSMENT — PAIN SCALES - GENERAL: PAINLEVEL_OUTOF10: 10

## 2019-03-11 ASSESSMENT — PAIN DESCRIPTION - LOCATION: LOCATION: BUTTOCKS;HIP;LEG

## 2019-03-11 ASSESSMENT — PAIN DESCRIPTION - ORIENTATION: ORIENTATION: RIGHT

## 2019-03-11 ASSESSMENT — PAIN - FUNCTIONAL ASSESSMENT: PAIN_FUNCTIONAL_ASSESSMENT: PREVENTS OR INTERFERES SOME ACTIVE ACTIVITIES AND ADLS

## 2019-03-11 ASSESSMENT — PAIN DESCRIPTION - FREQUENCY: FREQUENCY: CONTINUOUS

## 2019-03-11 ASSESSMENT — PAIN DESCRIPTION - PAIN TYPE: TYPE: CHRONIC PAIN

## 2019-03-14 ENCOUNTER — HOSPITAL ENCOUNTER (OUTPATIENT)
Dept: PHYSICAL THERAPY | Age: 74
Setting detail: THERAPIES SERIES
Discharge: HOME | End: 2019-03-14
Payer: MEDICARE

## 2019-03-14 PROCEDURE — G0283 ELEC STIM OTHER THAN WOUND: HCPCS | Performed by: PHYSICAL THERAPY ASSISTANT

## 2019-03-14 PROCEDURE — 97110 THERAPEUTIC EXERCISES: CPT | Performed by: PHYSICAL THERAPY ASSISTANT

## 2019-03-16 ASSESSMENT — ENCOUNTER SYMPTOMS: BACK PAIN: 1

## 2019-03-19 ENCOUNTER — HOSPITAL ENCOUNTER (OUTPATIENT)
Dept: PHYSICAL THERAPY | Age: 74
Setting detail: THERAPIES SERIES
Discharge: HOME | End: 2019-03-19
Payer: MEDICARE

## 2019-03-19 PROCEDURE — 97110 THERAPEUTIC EXERCISES: CPT | Performed by: PHYSICAL THERAPY ASSISTANT

## 2019-03-22 ENCOUNTER — TELEPHONE (OUTPATIENT)
Dept: INTERNAL MEDICINE | Age: 74
End: 2019-03-22

## 2019-03-22 ENCOUNTER — HOSPITAL ENCOUNTER (OUTPATIENT)
Dept: PHYSICAL THERAPY | Age: 74
Setting detail: THERAPIES SERIES
Discharge: HOME | End: 2019-03-22
Payer: MEDICARE

## 2019-03-22 DIAGNOSIS — M79.604 RIGHT LEG PAIN: ICD-10-CM

## 2019-03-22 DIAGNOSIS — M54.5 LOW BACK PAIN, UNSPECIFIED BACK PAIN LATERALITY, UNSPECIFIED CHRONICITY, WITH SCIATICA PRESENCE UNSPECIFIED: Primary | ICD-10-CM

## 2019-03-26 ENCOUNTER — HOSPITAL ENCOUNTER (OUTPATIENT)
Dept: PHYSICAL THERAPY | Age: 74
Setting detail: THERAPIES SERIES
Discharge: HOME | End: 2019-03-26
Payer: MEDICARE

## 2019-03-26 PROCEDURE — 97110 THERAPEUTIC EXERCISES: CPT | Performed by: PHYSICAL THERAPY ASSISTANT

## 2019-03-27 ENCOUNTER — HOSPITAL ENCOUNTER (OUTPATIENT)
Dept: LAB | Age: 74
Discharge: HOME | End: 2019-03-27
Payer: MEDICARE

## 2019-03-27 ENCOUNTER — OFFICE VISIT (OUTPATIENT)
Dept: INTERNAL MEDICINE | Age: 74
End: 2019-03-27
Payer: MEDICARE

## 2019-03-27 VITALS
HEART RATE: 60 BPM | WEIGHT: 198 LBS | BODY MASS INDEX: 33.8 KG/M2 | HEIGHT: 64 IN | TEMPERATURE: 97.2 F | DIASTOLIC BLOOD PRESSURE: 60 MMHG | SYSTOLIC BLOOD PRESSURE: 102 MMHG

## 2019-03-27 DIAGNOSIS — R53.83 OTHER FATIGUE: Primary | ICD-10-CM

## 2019-03-27 DIAGNOSIS — C50.212 MALIGNANT NEOPLASM OF UPPER-INNER QUADRANT OF LEFT BREAST IN FEMALE, ESTROGEN RECEPTOR POSITIVE (HCC): ICD-10-CM

## 2019-03-27 DIAGNOSIS — R53.83 OTHER FATIGUE: ICD-10-CM

## 2019-03-27 DIAGNOSIS — Z17.0 MALIGNANT NEOPLASM OF UPPER-INNER QUADRANT OF LEFT BREAST IN FEMALE, ESTROGEN RECEPTOR POSITIVE (HCC): ICD-10-CM

## 2019-03-27 DIAGNOSIS — D47.2 MGUS (MONOCLONAL GAMMOPATHY OF UNKNOWN SIGNIFICANCE): ICD-10-CM

## 2019-03-27 DIAGNOSIS — M54.5 LOW BACK PAIN, UNSPECIFIED BACK PAIN LATERALITY, UNSPECIFIED CHRONICITY, WITH SCIATICA PRESENCE UNSPECIFIED: ICD-10-CM

## 2019-03-27 DIAGNOSIS — F32.9 CHRONIC MAJOR DEPRESSIVE DISORDER: ICD-10-CM

## 2019-03-27 LAB
ABSOLUTE EOS #: 0.2 K/UL (ref 0–0.4)
ABSOLUTE IMMATURE GRANULOCYTE: ABNORMAL K/UL (ref 0–0.3)
ABSOLUTE LYMPH #: 1.3 K/UL (ref 1–4.8)
ABSOLUTE MONO #: 0.5 K/UL (ref 0.1–1.2)
ALBUMIN SERPL-MCNC: 4.7 G/DL (ref 3.5–5.2)
ALBUMIN/GLOBULIN RATIO: 1.4 (ref 1–2.5)
ALP BLD-CCNC: 43 U/L (ref 35–104)
ALT SERPL-CCNC: 29 U/L (ref 5–33)
ANION GAP SERPL CALCULATED.3IONS-SCNC: 10 MMOL/L (ref 9–17)
AST SERPL-CCNC: 27 U/L
BASOPHILS # BLD: 1 % (ref 0–1)
BASOPHILS ABSOLUTE: 0.1 K/UL (ref 0–0.2)
BILIRUB SERPL-MCNC: 0.21 MG/DL (ref 0.3–1.2)
BUN BLDV-MCNC: 29 MG/DL (ref 8–23)
BUN/CREAT BLD: 34 (ref 9–20)
CALCIUM SERPL-MCNC: 9.9 MG/DL (ref 8.6–10.4)
CHLORIDE BLD-SCNC: 102 MMOL/L (ref 98–107)
CO2: 25 MMOL/L (ref 20–31)
CREAT SERPL-MCNC: 0.86 MG/DL (ref 0.5–0.9)
DIFFERENTIAL TYPE: ABNORMAL
EOSINOPHILS RELATIVE PERCENT: 3 % (ref 1–7)
FREE KAPPA/LAMBDA RATIO: 0.32 (ref 0.26–1.65)
GFR AFRICAN AMERICAN: >60 ML/MIN
GFR NON-AFRICAN AMERICAN: >60 ML/MIN
GFR SERPL CREATININE-BSD FRML MDRD: ABNORMAL ML/MIN/{1.73_M2}
GFR SERPL CREATININE-BSD FRML MDRD: ABNORMAL ML/MIN/{1.73_M2}
GLUCOSE BLD-MCNC: 120 MG/DL (ref 70–99)
HCT VFR BLD CALC: 36.3 % (ref 36–46)
HEMOGLOBIN: 11.9 G/DL (ref 12–16)
IMMATURE GRANULOCYTES: ABNORMAL %
KAPPA FREE LIGHT CHAINS QNT: 1.38 MG/DL (ref 0.37–1.94)
LAMBDA FREE LIGHT CHAINS QNT: 4.37 MG/DL (ref 0.57–2.63)
LYMPHOCYTES # BLD: 24 % (ref 16–46)
MCH RBC QN AUTO: 29.8 PG (ref 26–34)
MCHC RBC AUTO-ENTMCNC: 32.8 G/DL (ref 31–37)
MCV RBC AUTO: 90.8 FL (ref 80–100)
MONOCYTES # BLD: 10 % (ref 4–11)
NRBC AUTOMATED: ABNORMAL PER 100 WBC
PDW BLD-RTO: 14.5 % (ref 11–14.5)
PLATELET # BLD: 272 K/UL (ref 140–450)
PLATELET ESTIMATE: ABNORMAL
PMV BLD AUTO: 8.6 FL (ref 6–12)
POTASSIUM SERPL-SCNC: 5.3 MMOL/L (ref 3.7–5.3)
RBC # BLD: 4 M/UL (ref 4–5.2)
RBC # BLD: ABNORMAL 10*6/UL
SEG NEUTROPHILS: 62 % (ref 43–77)
SEGMENTED NEUTROPHILS ABSOLUTE COUNT: 3.3 K/UL (ref 1.8–7.7)
SODIUM BLD-SCNC: 137 MMOL/L (ref 135–144)
THYROXINE, FREE: 1.13 NG/DL (ref 0.93–1.7)
TOTAL PROTEIN: 8 G/DL (ref 6.4–8.3)
TSH SERPL DL<=0.05 MIU/L-ACNC: 3.18 MIU/L (ref 0.3–5)
WBC # BLD: 5.4 K/UL (ref 3.5–11)
WBC # BLD: ABNORMAL 10*3/UL

## 2019-03-27 PROCEDURE — 99214 OFFICE O/P EST MOD 30 MIN: CPT | Performed by: NURSE PRACTITIONER

## 2019-03-27 PROCEDURE — 83883 ASSAY NEPHELOMETRY NOT SPEC: CPT

## 2019-03-27 PROCEDURE — 84155 ASSAY OF PROTEIN SERUM: CPT

## 2019-03-27 PROCEDURE — 86334 IMMUNOFIX E-PHORESIS SERUM: CPT

## 2019-03-27 PROCEDURE — 84439 ASSAY OF FREE THYROXINE: CPT

## 2019-03-27 PROCEDURE — 80053 COMPREHEN METABOLIC PANEL: CPT

## 2019-03-27 PROCEDURE — 85025 COMPLETE CBC W/AUTO DIFF WBC: CPT

## 2019-03-27 PROCEDURE — 36415 COLL VENOUS BLD VENIPUNCTURE: CPT

## 2019-03-27 PROCEDURE — 84165 PROTEIN E-PHORESIS SERUM: CPT

## 2019-03-27 PROCEDURE — 84443 ASSAY THYROID STIM HORMONE: CPT

## 2019-03-27 RX ORDER — DULOXETIN HYDROCHLORIDE 30 MG/1
30 CAPSULE, DELAYED RELEASE ORAL DAILY
Qty: 30 CAPSULE | Refills: 0 | Status: SHIPPED | OUTPATIENT
Start: 2019-03-27 | End: 2019-04-24 | Stop reason: SDUPTHER

## 2019-03-27 NOTE — PROGRESS NOTES
03/27/19  Carmen Diss  1945      Chief Complaint  1. Other fatigue    2. Low back pain, unspecified back pain laterality, unspecified chronicity, with sciatica presence unspecified    3. Chronic major depressive disorder    4. MGUS (monoclonal gammopathy of unknown significance)        HPI:    56-year-old patient in with complaints of ongoing fatigue for the last month. States just has not been able to eat well. Intermittent nausea. No abdominal pain. Feels that she has had increased depression due to her chronic low back pain and an able to do the things she likes. Spends most this time sitting in her home. Does have a history of MGUS. Questioning if this could be flaring up. Does have labs set up this week for oncology for ongoing follow-up. Allergies   Allergen Reactions    Amoxicillin-Pot Clavulanate     Crestor [Rosuvastatin]      MUSCLE PAIN/ACHES      Floxin [Ofloxacin]     Influenza Vaccines     Lipitor      MUSCLE PAIN/ACHES      Meloxicam Other (See Comments)     constipation    Pravastatin      MUSCLE PAIN/ACHES    Prednisone      Lost eyelashes       Red Yeast Rice [[place]ascus Purpureus Went Yeast]      POORLY TOLERATED    Zetia [Ezetimibe]      CONSTIPATION      Zocor [Simvastatin]      MUSCLE PAIN/ACHES    Gabapentin Rash       Past Medical History:   Diagnosis Date    Breast CA ([facility] [address]     2/15 s/p XRT & Dr Bridger Meek following    COPD (chronic obstructive pulmonary disease) ([facility] [address] 03/04    mild obstructive defect on PFT's    Depression     Dyslipidemia     Failed 6 meds 10 yr risk 10% calc 12/15    Family hx of colon cancer     Hyperplastic colon polyp     Colonoscopy 2/16/15 with repeat recommended 2/16/2020    IFG (impaired fasting glucose) 5/9/2017    Insomnia     MGUS (monoclonal gammopathy of unknown significance)     0.64 grams/decilier IgG lambda, 04/12. 1.) UPUP negative 04/12.   Stable July 2014    Osteoarthritis     particularly in the hands    Osteopenia tablets by mouth daily as needed      Calcium Carbonate-Vitamin D (CALCIUM 600 + D PO)   Take 1 tablet by mouth daily        No current facility-administered medications on file prior to visit. Social History     Socioeconomic History    Marital status:      Spouse name: Not on file    Number of children: 2    Years of education: Not on file    Highest education level: Not on file   Occupational History    Not on file   Social Needs    Financial resource strain: Not on file    Food insecurity:     Worry: Not on file     Inability: Not on file    Transportation needs:     Medical: Not on file     Non-medical: Not on file   Tobacco Use    Smoking status: Former Smoker     Packs/day: 0.50     Years: 10.00     Pack years: 5.00     Last attempt to quit: 2014     Years since quittin.9    Smokeless tobacco: Never Used    Tobacco comment: quit 10/11 - smokes some days   Substance and Sexual Activity    Alcohol use: Yes     Alcohol/week: 0.0 oz     Comment: 1 drink a week    Drug use: No    Sexual activity: Not on file   Lifestyle    Physical activity:     Days per week: Not on file     Minutes per session: Not on file    Stress: Not on file   Relationships    Social connections:     Talks on phone: Not on file     Gets together: Not on file     Attends Yazdanism service: Not on file     Active member of club or organization: Not on file     Attends meetings of clubs or organizations: Not on file     Relationship status: Not on file    Intimate partner violence:     Fear of current or ex partner: Not on file     Emotionally abused: Not on file     Physically abused: Not on file     Forced sexual activity: Not on file   Other Topics Concern    Not on file   Social History Narrative    She works ar a[employer] on [employer]. 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 She has 2 adult sons, 4 grandkids. Review of Systems   Constitutional: Positive for fatigue. Negative for activity change, appetite change, chills, fever and unexpected weight change. HENT: Negative for congestion, dental problem, ear discharge, ear pain, facial swelling, hearing loss, postnasal drip, rhinorrhea, sinus pressure, sore throat and trouble swallowing. Eyes: Negative for pain and visual disturbance. Respiratory: Negative for cough, chest tightness, shortness of breath and wheezing. Cardiovascular: Negative for chest pain, palpitations and leg swelling. Gastrointestinal: Negative for abdominal pain, blood in stool, constipation, diarrhea, nausea and vomiting. Endocrine: Negative for cold intolerance, heat intolerance and polyuria. Genitourinary: Negative for difficulty urinating. Musculoskeletal: Positive for arthralgias and back pain. Negative for gait problem, myalgias, neck pain and neck stiffness. Skin: Negative for color change, rash and wound. Neurological: Negative for dizziness, tremors, seizures, weakness, light-headedness, numbness and headaches. Psychiatric/Behavioral: Positive for dysphoric mood and sleep disturbance. Negative for confusion, hallucinations, self-injury and suicidal ideas. The patient is not nervous/anxious. Physical Exam   Constitutional: She is oriented to person, place, and time. She appears well-developed and well-nourished. No distress. HENT:   Head: Normocephalic and atraumatic. Right Ear: External ear normal.   Left Ear: External ear normal.   Eyes: Right eye exhibits no discharge. Left eye exhibits no discharge. Neck: No tracheal deviation present. Cardiovascular: Normal rate, regular rhythm, normal heart sounds and intact distal pulses. Exam reveals no gallop and no friction rub. No murmur heard. Pulmonary/Chest: Effort normal and breath sounds normal. No respiratory distress. She has no wheezes. She has no rales. She exhibits no tenderness.    Abdominal: Soft. Bowel sounds are normal. She exhibits no distension. There is no tenderness. Musculoskeletal: She exhibits tenderness. She exhibits no edema. Lumbar back: She exhibits tenderness and pain. She exhibits normal range of motion, no bony tenderness, no swelling, no edema, no laceration, no spasm and normal pulse. Neurological: She is alert and oriented to person, place, and time. No cranial nerve deficit or sensory deficit. Coordination normal.   Skin: Skin is warm and dry. Capillary refill takes less than 2 seconds. No rash noted. She is not diaphoretic. No pallor. Psychiatric: Her speech is normal and behavior is normal. Judgment and thought content normal. Her mood appears not anxious. Her affect is not angry. Thought content is not paranoid and not delusional. Cognition and memory are normal. She does not express impulsivity. She exhibits a depressed mood. She expresses no homicidal and no suicidal ideation. She expresses no suicidal plans and no homicidal plans. Nursing note and vitals reviewed. Vitals:    03/27/19 1301   BP: 102/60   Position: Sitting   Cuff Size: Large Adult   Pulse: 60   Temp: 97.2 °F (36.2 °C)   TempSrc: Tympanic   Weight: 198 lb (89.8 kg)   Height: 5' 3.5\" (1.613 m)       Assessment:  1. Other fatigue  Appears depression related-= will start Cymbalta  Check labs  - TSH; Future  - T4, Free; Future    2. Low back pain, unspecified back pain laterality, unspecified chronicity, with sciatica presence unspecified  Add Cymbalta   PT     3. Chronic major depressive disorder    - DULoxetine (CYMBALTA) 30 MG extended release capsule; Take 1 capsule by mouth daily  Dispense: 30 capsule; Refill: 0    4. MGUS (monoclonal gammopathy of unknown significance)  Stable  Ongoing follow up with onc/hem       Plan:  As noted above. Follow up for routine visit. Call sooner with concerns prior.     Electronically signed by NICOLASA Skaggs CNP on 3/27/2019 at 7:21 PM

## 2019-03-28 LAB
ALBUMIN (CALCULATED): 5.1 G/DL (ref 3.2–5.2)
ALBUMIN PERCENT: 65 % (ref 45–65)
ALPHA 1 PERCENT: 2 % (ref 3–6)
ALPHA 2 PERCENT: 10 % (ref 6–13)
ALPHA-1-GLOBULIN: 0.2 G/DL (ref 0.1–0.4)
ALPHA-2-GLOBULIN: 0.8 G/DL (ref 0.5–0.9)
BETA GLOBULIN: 0.7 G/DL (ref 0.5–1.1)
BETA PERCENT: 9 % (ref 11–19)
GAMMA GLOBULIN %: 14 % (ref 9–20)
GAMMA GLOBULIN: 1.1 G/DL (ref 0.5–1.5)
PATHOLOGIST: ABNORMAL
PATHOLOGIST: NORMAL
PROTEIN ELECTROPHORESIS, SERUM: ABNORMAL
SERUM IFX INTERP: NORMAL
TOTAL PROT. SUM,%: 100 % (ref 98–102)
TOTAL PROT. SUM: 7.9 G/DL (ref 6.3–8.2)
TOTAL PROTEIN: 7.8 G/DL (ref 6.4–8.3)

## 2019-03-29 ENCOUNTER — HOSPITAL ENCOUNTER (OUTPATIENT)
Dept: PHYSICAL THERAPY | Age: 74
Setting detail: THERAPIES SERIES
Discharge: HOME | End: 2019-03-29
Payer: MEDICARE

## 2019-03-29 PROCEDURE — 97110 THERAPEUTIC EXERCISES: CPT | Performed by: PHYSICAL THERAPY ASSISTANT

## 2019-04-02 ENCOUNTER — HOSPITAL ENCOUNTER (OUTPATIENT)
Dept: PHYSICAL THERAPY | Age: 74
Setting detail: THERAPIES SERIES
Discharge: HOME | End: 2019-04-02
Payer: MEDICARE

## 2019-04-02 PROCEDURE — 97110 THERAPEUTIC EXERCISES: CPT | Performed by: PHYSICAL THERAPY ASSISTANT

## 2019-04-02 NOTE — FLOWSHEET NOTE
Physical Therapy Daily Treatment Note    Date:  2019    Patient Name:  Hector Daniels    :  1945  MRN: [id number]  Restrictions/Precautions: Does not want to do aquatics. Will see how [place] ex goes    Medical/Treatment Diagnosis Information:   · Diagnosis: Lumbar spondylosis, R throchanteric bursitis  · Treatment Diagnosis: Lumbar spondylosis  Insurance/Certification information:  PT Insurance Information: [employer] medicare  Physician Information:  Referring Practitioner: Lydia Cuadra  Plan of care signed (Y/N):  n  Visit# / total visits:    Pain level: 4-5/10 back pain          6/10 R leg pain       Functional Assessment Tool Used: Oswestry LBP Scale  Score: 39/50 = 78%     Time In: 2:19  Time Out: 2:57    Progress Note: []  Yes  [x]  No  Next due by: Visit #8, or 4/10/19      Subjective:   Patient states 4-5/10 back pain on this date and 6/10 pain down the leg. She states the pain is increased from previous sessions due to cleaning her living room yesterday. Patient performed HEP after cleaning the living room which helped to decrease the pain. Objective: PAUL complete per flow chart to increase strength, ROM and decrease pain for ease with daily activities to return to prior level of function. Patient tolerated added exercises on this date with complaints of hip soreness not but limiting session. Verbal instructions were provided for proper technique as well as demonstration.    Observation:    Test measurements:      Exercises:   Exercise/Equipment Resistance/Repetitions Other comments   Lay prone in R side glide 5'    Prone     Prone on elbows 5'       Press up 10x2   ADV    B PKF 10x3    R clamshell/hip abd 10x2 Initiated hip abd   Modified extension 10x2    Back bend 10x3    Hip abd/ext x10 initiated   Hamstring curls  x15 initiated                  Lumbar roll     R sciatic N floss           R Knee TKE x15              [x] Provided verbal/tactile cueing for activities related to strengthening, flexibility, endurance, ROM. (65111)  [] Provided verbal/tactile cueing for activities related to improving balance, coordination, kinesthetic sense, posture, motor skill, proprioception. (93412)    Therapeutic Activities:     [] Therapeutic activities, direct (one-on-one) patient contact (use of dynamic activities to improve functional performance). (11776)    Gait:   [] Provided training and instruction to the patient for ambulation re-education. (36940)    Self-Care/ADL's  [] Self-care/home management training and compensatory training, meal preparation, safety procedures, and instructions in use of assistive technology devices/adaptive equipment, direct one-on-one contact. (93217)    Home Exercise Program:   Lumbar extension progression for post derangement  [x] Reviewed/Progressed HEP activities related to strengthening, flexibility, endurance, ROM. (60668)  [] Reviewed/Progressed HEP activities related to improving balance, coordination, kinesthetic sense, posture, motor skill, proprioception.  (76259)    Manual Treatments:    [] Provided manual therapy to mobilize soft tissue/joints for the purpose of modulating pain, promoting relaxation,  increasing ROM, reducing/eliminating soft tissue swelling/inflammation/restriction, improving soft tissue extensibility.  (35586)    Service Based Modalities:      Timed Code Treatment Minutes: 45' PAUL      Total Treatment Minutes:  45 '    Treatment/Activity Tolerance:  [x] Patient tolerated treatment well [] Patient limited by fatique  [] Patient limited by pain  [] Patient limited by other medical complications  [] Other:     Prognosis: [x] Good [] Fair  [] Poor    Patient Requires Follow-up: [x] Yes  [] No      Goals:  Short term goals  Time Frame for Short term goals: 1 week  Short term goal 1: Start HEP for lumbar spine    Long term goals  Time Frame for Long term goals : 4 weeks  Long term goal 1: Pain controlled 2-3/10 to allow regular ADL (4-5/10

## 2019-04-03 ENCOUNTER — HOSPITAL ENCOUNTER (OUTPATIENT)
Dept: MRI IMAGING | Age: 74
Discharge: HOME | End: 2019-04-05
Payer: MEDICARE

## 2019-04-03 DIAGNOSIS — M79.604 RIGHT LEG PAIN: ICD-10-CM

## 2019-04-03 DIAGNOSIS — M54.5 LOW BACK PAIN, UNSPECIFIED BACK PAIN LATERALITY, UNSPECIFIED CHRONICITY, WITH SCIATICA PRESENCE UNSPECIFIED: ICD-10-CM

## 2019-04-03 PROCEDURE — 72148 MRI LUMBAR SPINE W/O DYE: CPT

## 2019-04-03 NOTE — PROGRESS NOTES
I have reviewed and agree to the content of the note written by the PTA.   Electronically signed by Amberly Schaefer PT [id number]

## 2019-04-05 ENCOUNTER — HOSPITAL ENCOUNTER (OUTPATIENT)
Dept: PHYSICAL THERAPY | Age: 74
Setting detail: THERAPIES SERIES
Discharge: HOME | End: 2019-04-05
Payer: MEDICARE

## 2019-04-05 ASSESSMENT — ENCOUNTER SYMPTOMS
FACIAL SWELLING: 0
NAUSEA: 0
TROUBLE SWALLOWING: 0
BLOOD IN STOOL: 0
SINUS PRESSURE: 0
DIARRHEA: 0
WHEEZING: 0
SHORTNESS OF BREATH: 0
CONSTIPATION: 0
ABDOMINAL PAIN: 0
BACK PAIN: 1
COUGH: 0
COLOR CHANGE: 0
CHEST TIGHTNESS: 0
EYE PAIN: 0
SORE THROAT: 0
RHINORRHEA: 0
VOMITING: 0

## 2019-04-05 NOTE — PROGRESS NOTES
Physical Therapy  Outpatient Physical Therapy    [x] [place]  Phone: [phone]  Fax: [phone]      [] [place]  Phone: [phone]  Fax: [phone]    Physical Therapy  Cancellation/No-show Note  Patient Name:  Sukhdeep Segovia  :  1945   Date:  2019  Cancelled visits to date: 2  No-shows to date: 0    For today's appointment patient:  [x]  Cancelled  []  Rescheduled appointment  []  No-show     Reason given by patient:  []  Patient ill  []  Conflicting appointment  []  No transportation    []  Conflict with work  []  No reason given  [x]  Other:     Comments:   Neck Pain    Electronically signed by:  Day Romeo PTA

## 2019-04-08 ENCOUNTER — HOSPITAL ENCOUNTER (OUTPATIENT)
Dept: PHYSICAL THERAPY | Age: 74
Setting detail: THERAPIES SERIES
Discharge: HOME | End: 2019-04-08
Payer: MEDICARE

## 2019-04-08 PROCEDURE — 97110 THERAPEUTIC EXERCISES: CPT | Performed by: PHYSICAL THERAPY ASSISTANT

## 2019-04-08 NOTE — FLOWSHEET NOTE
Physical Therapy Daily Treatment Note    Date:  2019    Patient Name:  Kartik Beverly    :  1945  MRN: [id number]  Restrictions/Precautions: Does not want to do aquatics. Will see how [place] ex goes    Medical/Treatment Diagnosis Information:   · Diagnosis: Lumbar spondylosis, R throchanteric bursitis  · Treatment Diagnosis: Lumbar spondylosis  Insurance/Certification information:  PT Insurance Information: [employer] medicare  Physician Information:  Referring Practitioner: Uma Syed  Plan of care signed (Y/N):  n   Visit# / total visits:    Pain level: 5/10 R leg pain       Functional Assessment Tool Used: Oswestry LBP Scale  Score: 39/50 = 78%     Time In: 11:33  Time Out: 12:12    Progress Note: []  Yes  [x]  No  Next due by: Visit #8, or 4/10/19      Subjective:   Patient states 5/10 pain down R leg. States she was working out side over the weekend causing the pain to increase. Patient states she did her exercises over the weekend to help decrease the pain. Patient states \"we are not adding anything today my legs cannot take it\". Objective: PAUL complete per flow chart to increase strength, ROM and decrease pain for ease with daily activities to return to prior level of function. Patient tolerated therapy session on this date with minimal increase of pain. Verbal instructions were provided for proper order of exercises. Instructed patient to discuss concerns regarding need to see ortho surgeon (Dr. John Benitez) and any other possible options for pain relief with PCP Al Sam). Understanding noted.     Observation:     Test measurements:      Exercises:   Exercise/Equipment Resistance/Repetitions Other comments   Lay prone in R side glide 5'    Prone     Prone on elbows 5'       Press up held    Due to increased shoulder pain   B PKF 10x3    R clamshell/hip abd 10x2  hip abd   Modified extension 10x2    Back bend 10x3    Hip abd/ext x10    Hamstring curls  x15                   Lumbar roll     R sciatic N floss           R Knee TKE x15              [x] Provided verbal/tactile cueing for activities related to strengthening, flexibility, endurance, ROM. (79732)  [] Provided verbal/tactile cueing for activities related to improving balance, coordination, kinesthetic sense, posture, motor skill, proprioception. (80457)    Therapeutic Activities:     [] Therapeutic activities, direct (one-on-one) patient contact (use of dynamic activities to improve functional performance). (45234)    Gait:   [] Provided training and instruction to the patient for ambulation re-education. (07773)    Self-Care/ADL's  [] Self-care/home management training and compensatory training, meal preparation, safety procedures, and instructions in use of assistive technology devices/adaptive equipment, direct one-on-one contact. (72879)    Home Exercise Program:   Lumbar extension progression for post derangement  [x] Reviewed/Progressed HEP activities related to strengthening, flexibility, endurance, ROM. (39288)  [] Reviewed/Progressed HEP activities related to improving balance, coordination, kinesthetic sense, posture, motor skill, proprioception.  (31311)    Manual Treatments:    [] Provided manual therapy to mobilize soft tissue/joints for the purpose of modulating pain, promoting relaxation,  increasing ROM, reducing/eliminating soft tissue swelling/inflammation/restriction, improving soft tissue extensibility.  (49394)    Service Based Modalities:      Timed Code Treatment Minutes: 44' PAUL      Total Treatment Minutes:  44 '    Treatment/Activity Tolerance:  [x] Patient tolerated treatment well [] Patient limited by fatique  [] Patient limited by pain  [] Patient limited by other medical complications  [] Other:     Prognosis: [x] Good [] Fair  [] Poor    Patient Requires Follow-up: [x] Yes  [] No      Goals:  Short term goals  Time Frame for Short term goals: 1 week  Short term goal 1: Start HEP for lumbar spine    Long term goals  Time Frame for Long term goals : 4 weeks  Long term goal 1: Pain controlled 2-3/10 to allow regular ADL (4-5/10 R leg pain)  Long term goal 2: Gait deviations reduced 50% (No gait deviations on this date)   Long term goal 3: Able to sleep thru 3/4 of the night (waking up a few times a night, may not be due to pain)  Long term goal 4: Able to stand 20-30 min (causes pain)        Plan:   [x] Continue per plan of care [] Alter current plan (see comments)  [] Plan of care initiated [] Hold pending MD visit [] Discharge     Plan for Next Session: Continue to progress patient per tolerance. Santos WILLIAMSON performed session under direct supervision of Cal Gaitan PTA    Electronically signed by:   Jodi Company

## 2019-04-09 ENCOUNTER — APPOINTMENT (OUTPATIENT)
Dept: PHYSICAL THERAPY | Age: 74
End: 2019-04-09
Payer: MEDICARE

## 2019-04-09 NOTE — PROGRESS NOTES
I have reviewed and agree to the content of the note written by the PTA.   Electronically signed by Roly Durand PT [id number]

## 2019-04-11 ENCOUNTER — HOSPITAL ENCOUNTER (OUTPATIENT)
Dept: PHYSICAL THERAPY | Age: 74
Setting detail: THERAPIES SERIES
Discharge: HOME | End: 2019-04-11
Payer: MEDICARE

## 2019-04-11 PROCEDURE — 97110 THERAPEUTIC EXERCISES: CPT | Performed by: PHYSICAL THERAPY ASSISTANT

## 2019-04-11 NOTE — FLOWSHEET NOTE
Physical Therapy Daily Treatment Note    Date:  2019    Patient Name:  Olinda Crum    :  1945  MRN: [id number]  Restrictions/Precautions: Does not want to do aquatics. Will see how [place] ex goes    Medical/Treatment Diagnosis Information:   · Diagnosis: Lumbar spondylosis, R throchanteric bursitis  · Treatment Diagnosis: Lumbar spondylosis  Insurance/Certification information:  PT Insurance Information: [employer] medicare  Physician Information:  Referring Practitioner: Noe Fuentes  Plan of care signed (Y/N):  n   Visit# / total visits:    Pain level: 1-2/10 R low back pain        Functional Assessment Tool Used: Oswestry LBP Scale  Score: 20/50 = 40%     Time In: 2:16  Time Out: 2:58    Progress Note: []  Yes  [x]  No  Next due by: Visit #8, or 4/10/19      Subjective:   Patient states 1-2/10 pain in right low back region. Last night her pain was flared up all the way down the R leg into the ankle and she could not find a way to relieve the pain. She took tylenol to bring the pain down last night. Objective: PAUL complete per flow chart to increase strength, ROM and decrease pain for ease with daily activities to return to prior level of function. Addressed goals on this date. Administered Oswestry scoring 20/50 or 40%. Patient tolerate exercises on this date with no increase of pain. Verbal instructions were provided for proper performance of exercises for best benefit of treatment session. Instructed patient to discuss concerns regarding need to see ortho surgeon (Dr. Shravan Robertson) and any other possible options for pain relief with PCP Coco Small). Understanding noted.     Observation:   Lacking approximately 20 degrees during prone press up exercises  Test measurements:      Exercises:   Exercise/Equipment Resistance/Repetitions Other comments   Lay prone in R side glide 6' ADV   Prone     Prone on elbows 5'       Press up 2x10     B PKF 10x3    R clamshell/hip abd 10x2  hip abd Modified extension 10x2    Back bend 10x3    Hip abd/ext x15 ADV   Hamstring curls  x15                   Lumbar roll     R sciatic N floss           R Knee TKE               [x] Provided verbal/tactile cueing for activities related to strengthening, flexibility, endurance, ROM. (71191)  [] Provided verbal/tactile cueing for activities related to improving balance, coordination, kinesthetic sense, posture, motor skill, proprioception. (73129)    Therapeutic Activities:     [] Therapeutic activities, direct (one-on-one) patient contact (use of dynamic activities to improve functional performance). (91460)    Gait:   [] Provided training and instruction to the patient for ambulation re-education. (44969)    Self-Care/ADL's  [] Self-care/home management training and compensatory training, meal preparation, safety procedures, and instructions in use of assistive technology devices/adaptive equipment, direct one-on-one contact. (26044)    Home Exercise Program:   Lumbar extension progression for post derangement  [x] Reviewed/Progressed HEP activities related to strengthening, flexibility, endurance, ROM. (91575)  [] Reviewed/Progressed HEP activities related to improving balance, coordination, kinesthetic sense, posture, motor skill, proprioception.  (47712)    Manual Treatments:    [] Provided manual therapy to mobilize soft tissue/joints for the purpose of modulating pain, promoting relaxation,  increasing ROM, reducing/eliminating soft tissue swelling/inflammation/restriction, improving soft tissue extensibility.  (85720)    Service Based Modalities:      Timed Code Treatment Minutes:42 ' PAUL      Total Treatment Minutes: 43  '    Treatment/Activity Tolerance:  [x] Patient tolerated treatment well [] Patient limited by fatique  [] Patient limited by pain  [] Patient limited by other medical complications  [] Other:     Prognosis: [x] Good [] Fair  [] Poor    Patient Requires Follow-up: [x] Yes  [] No      Goals:  Short term goals  Time Frame for Short term goals: 1 week  Short term goal 1: Start HEP for lumbar spine    Long term goals  Time Frame for Long term goals : 4 weeks  Long term goal 1: Pain controlled 2-3/10 to allow regular ADL (1-2/10 low back)  Long term goal 2: Gait deviations reduced 50% (No gait deviations on this date)   Long term goal 3: Able to sleep thru 3/4 of the night (did not sleep last night due to pain flare up)  Long term goal 4: Able to stand 20-30 min (able to complete task moving around for longer than 1 hour)        Plan:   [x] Continue per plan of care [] Alter current plan (see comments)  [] Plan of care initiated [] Hold pending MD visit [] Discharge     Plan for Next Session:   Plan to continue until see by PCP 4/24/19. Anastacio WILLIAMSON performed session under direct supervision of Dolores Ambrosia PTA    Electronically signed by:   Jodi Company MEGHNA

## 2019-04-12 NOTE — PROGRESS NOTES
Physical Therapy  [facility]  Rehabilitation and Sports Medicine    [x] [place]  Phone: [phone]  Fax: [phone]      [] [place]  Phone: [phone]  Fax: [phone]    Physical Therapy Progress Note  Date: 2019        Patient Name:  Coy Murphy    :  1945  MRN: [id number]  Restrictions/Precautions:     Does not want to do aquatics. Will see how [place] ex goes    Medical/Treatment Diagnosis Information:  ·   Diagnosis: Lumbar spondylosis, R throchanteric bursitis  · Treatment Diagnosis: Lumbar spondylosis  ·    Insurance/Certification information:    [facility] medicare  Physician Information:     Karoline Wynn 18 of care signed (Y/N):   Visit# / total visits: 7  Pain level: 1-2/10      Time Period for Report:   Cancels/No-shows to date:     Plan of Care/Treatment to date:  [x] Therapeutic Exercise    [] Modalities:  [] Therapeutic Activity     [] Ultrasound  [x] Electrical Stimulation  [] Gait Training      [] Cervical Traction    [] Lumbar Traction  [] Neuromuscular Re-education  [] Cold/hotpack [] Iontophoresis  [x] Instruction in HEP      Other:  [] Manual Therapy       []    [] Aquatic Therapy       []                    ? Subjective:    Patient states 1-2/10 pain in right low back region. Last night her pain was flared up all the way down the R leg into the ankle and she could not find a way to relieve the pain  Objective:   R sciatica pattern  + signs of lumbar spine origin.   Lumbar extension deficient 20%  + dural tension signs with R SLR and Slump test       Plan:    continue POC       Goals:    Short term goals  Time Frame for Short term goals: 1 week  Short term goal 1: Start HEP for lumbar spine-met     Long term goals  Time Frame for Long term goals : 4 weeks  Long term goal 1: Pain controlled 2-3/10 to allow regular ADL -part met  Long term goal 2: Gait deviations reduced 50% -met  Long term goal 3: Able to sleep thru 3/4 of the night -part met  Long term goal 4: Able to stand 20-30 min -part met      Current Frequency/Duration:4/11/19 - 5/10/19  # Days per week: [] 1 day # Weeks: [] 1 week [x] 4 weeks      [x] 2 days? [] 2 weeks [] 5 weeks      [] 3 days   [] 3 weeks [] 6 weeks     Rehab Potential: [] Excellent [x] Good [] Fair  [] Poor     Goal Status:  [] Achieved [x] Partially Achieved  [] Not Achieved     Patient Status: [] Continue per initial plan of Care     [] Patient now discharged     [x] Additional visits requested, Please re-certify for additional visits:      Requested frequency/duration: 2 X/week for4 weeks    Electronically signed by:  Talya Lara PT      If you have any questions or concerns, please don't hesitate to call.   Thank you for your referral.    Physician Signature:________________________________Date:__________________  By signing above, therapists plan is approved by physician

## 2019-04-12 NOTE — PROGRESS NOTES
I have reviewed and agree to the content of the note written by the PTA.   Electronically signed by Karl Hylton PT [id number]

## 2019-04-16 ENCOUNTER — HOSPITAL ENCOUNTER (OUTPATIENT)
Dept: PHYSICAL THERAPY | Age: 74
Setting detail: THERAPIES SERIES
Discharge: HOME | End: 2019-04-16
Payer: MEDICARE

## 2019-04-16 PROCEDURE — 97110 THERAPEUTIC EXERCISES: CPT | Performed by: PHYSICAL THERAPY ASSISTANT

## 2019-04-16 NOTE — FLOWSHEET NOTE
Physical Therapy Daily Treatment Note    Date:  2019    Patient Name:  Saba Tabares    :  1945  MRN: [id number]  Restrictions/Precautions: Does not want to do aquatics. Will see how [place] ex goes    Medical/Treatment Diagnosis Information:   · Diagnosis: Lumbar spondylosis, R throchanteric bursitis  · Treatment Diagnosis: Lumbar spondylosis  Insurance/Certification information:  PT Insurance Information: [employer] medicare  Physician Information:  Referring Practitioner: Kaylan Shirley  Plan of care signed (Y/N):  n   Visit# / total visits:  1 2nd POC (8 total)  Pain level: 310 R low back pain        Functional Assessment Tool Used: Oswestry LBP Scale  Score: 20/50 = 40%     Time In: 2:24  Time Out:303    Progress Note: []  Yes  [x]  No  Next due by: Visit #8, or 5/10/19      Subjective:   Pain 3/10 through right ankle and distal hip. Overall improvement noted. Compliance with current HEP noted regularly. Notes increased ease with management of low back pain. Objective: PAUL complete per flow chart to increase strength, ROM and decrease pain for ease with daily activities to return to prior level of function. Verbal cuing for progression and technique with exercises. Added and advanced several exercises to improve strength and mobility. Able to improve pain and discomfort at conclusion of session. Observation:   Limited lumbar extension remains.    Test measurements:      Exercises:   Exercise/Equipment Resistance/Repetitions Other comments   Lay prone in R side glide 6' ADV   Prone 2    Prone on elbows 5'       Press up 3x5    B PKF 10x3    R clamshell/hip abd 10x2  hip abd             Modified extension 10x2    Back bend 10x2    Hip abd/ext x15 ADV   Hamstring curls  x15    LTR 10x5''    Hip ADD/ABD 10x ea BALL,GREEN        Lumbar roll     R sciatic N floss           R Knee TKE               [x] Provided verbal/tactile cueing for activities related to strengthening, flexibility, endurance, ROM. (88178)  [] Provided verbal/tactile cueing for activities related to improving balance, coordination, kinesthetic sense, posture, motor skill, proprioception. (83259)    Therapeutic Activities:     [] Therapeutic activities, direct (one-on-one) patient contact (use of dynamic activities to improve functional performance). (06401)    Gait:   [] Provided training and instruction to the patient for ambulation re-education. (98663)    Self-Care/ADL's  [] Self-care/home management training and compensatory training, meal preparation, safety procedures, and instructions in use of assistive technology devices/adaptive equipment, direct one-on-one contact. (00053)    Home Exercise Program:   Lumbar extension progression for post derangement  [x] Reviewed/Progressed HEP activities related to strengthening, flexibility, endurance, ROM. (19509)  [] Reviewed/Progressed HEP activities related to improving balance, coordination, kinesthetic sense, posture, motor skill, proprioception.  (26284)    Manual Treatments:    [] Provided manual therapy to mobilize soft tissue/joints for the purpose of modulating pain, promoting relaxation,  increasing ROM, reducing/eliminating soft tissue swelling/inflammation/restriction, improving soft tissue extensibility.  (53939)    Service Based Modalities:      Timed Code Treatment Minutes:39 ' PAUL      Total Treatment Minutes: 44'    Treatment/Activity Tolerance:  [x] Patient tolerated treatment well [] Patient limited by fatique  [] Patient limited by pain  [] Patient limited by other medical complications  [] Other:     Prognosis: [x] Good [] Fair  [] Poor    Patient Requires Follow-up: [x] Yes  [] No      Goals:  Short term goals  Time Frame for Short term goals: 1 week  Short term goal 1: Start HEP for lumbar spine    Long term goals  Time Frame for Long term goals : 4 weeks  Long term goal 1: Pain controlled 2-3/10 to allow regular ADL  Long term goal 2: Gait deviations reduced 50% Long term goal 3: Able to sleep thru 3/4 of the night   Long term goal 4: Able to stand 20-30 min        Plan:   [x] Continue per plan of care [] Alter current plan (see comments)  [] Plan of care initiated [] Hold pending MD visit [] Discharge     Plan for Next Session:   Continue to progress and advance as able. Electronically signed by:   Jodi Company PTA

## 2019-04-19 ENCOUNTER — APPOINTMENT (OUTPATIENT)
Dept: PHYSICAL THERAPY | Age: 74
End: 2019-04-19
Payer: MEDICARE

## 2019-04-22 ENCOUNTER — HOSPITAL ENCOUNTER (OUTPATIENT)
Dept: PHYSICAL THERAPY | Age: 74
Setting detail: THERAPIES SERIES
Discharge: HOME | End: 2019-04-22
Payer: MEDICARE

## 2019-04-22 PROCEDURE — 97110 THERAPEUTIC EXERCISES: CPT | Performed by: PHYSICAL THERAPY ASSISTANT

## 2019-04-22 NOTE — FLOWSHEET NOTE
Physical Therapy Daily Treatment Note    Date:  2019    Patient Name:  Manan Tyler    :  1945  MRN: [id number]  Restrictions/Precautions: Does not want to do aquatics. Will see how [place] ex goes    Medical/Treatment Diagnosis Information:   · Diagnosis: Lumbar spondylosis, R throchanteric bursitis  · Treatment Diagnosis: Lumbar spondylosis  Insurance/Certification information:  PT Insurance Information: [employer] medicare  Physician Information:  Referring Practitioner: Holger Davis  Plan of care signed (Y/N):  n   Visit# / total visits:  28 2nd POC (9 total)  Pain level: 3/10 R low back pain        Functional Assessment Tool Used: Oswestry LBP Scale  Score: 20/50 = 40%      Time In: 1344  Time Out:1211    Progress Note: []  Yes  [x]  No  Next due by: Visit #8, or 5/10/19      Subjective:   Notes increased pain over weekend without known cause. Pain this date rated 1-2/10 through right lateral ankle and gluteal region. Objective: PAUL complete per flow chart to increase strength, ROM and decrease pain for ease with daily activities to return to prior level of function. Verbal cuing for progression and technique with exercises. Added and advanced several exercises to improve strength and mobility. Able to improve pain and discomfort at conclusion of session. Observation:   Limited lumbar extension remains.    Test measurements:      Exercises:   Exercise/Equipment Resistance/Repetitions Other comments   Lay prone in R side glide 5' ADV   Prone     Prone on elbows 5'       Press up 3x5    B PKF 10x3    R clamshell/hip abd 10x2  hip abd             Modified extension 10x2    Back bend 10x2    Hip abd/ext x15 ADV   Hamstring curls  x15    LTR 10x5''    Hip ADD/ABD 10x ea BALL,GREEN        Lumbar roll     R sciatic N floss           R Knee TKE               [x] Provided verbal/tactile cueing for activities related to strengthening, flexibility, endurance, ROM. (56108)  [] Provided verbal/tactile cueing for activities related to improving balance, coordination, kinesthetic sense, posture, motor skill, proprioception. (15042)    Therapeutic Activities:     [] Therapeutic activities, direct (one-on-one) patient contact (use of dynamic activities to improve functional performance). (18664)    Gait:   [] Provided training and instruction to the patient for ambulation re-education. (12419)    Self-Care/ADL's  [] Self-care/home management training and compensatory training, meal preparation, safety procedures, and instructions in use of assistive technology devices/adaptive equipment, direct one-on-one contact. (82910)    Home Exercise Program:   Lumbar extension progression for post derangement  [x] Reviewed/Progressed HEP activities related to strengthening, flexibility, endurance, ROM. (51885)  [] Reviewed/Progressed HEP activities related to improving balance, coordination, kinesthetic sense, posture, motor skill, proprioception.  (94539)    Manual Treatments:    [] Provided manual therapy to mobilize soft tissue/joints for the purpose of modulating pain, promoting relaxation,  increasing ROM, reducing/eliminating soft tissue swelling/inflammation/restriction, improving soft tissue extensibility.  (26245)    Service Based Modalities:      Timed Code Treatment Minutes:39 ' PAUL      Total Treatment Minutes: 44'    Treatment/Activity Tolerance:  [x] Patient tolerated treatment well [] Patient limited by fatique  [] Patient limited by pain  [] Patient limited by other medical complications  [] Other:     Prognosis: [x] Good [] Fair  [] Poor    Patient Requires Follow-up: [x] Yes  [] No      Goals:  Short term goals  Time Frame for Short term goals: 1 week  Short term goal 1: Start HEP for lumbar spine    Long term goals  Time Frame for Long term goals : 4 weeks  Long term goal 1: Pain controlled 2-3/10 to allow regular ADL  Long term goal 2: Gait deviations reduced 50%   Long term goal 3: Able to sleep thru 3/4 of

## 2019-04-24 ENCOUNTER — OFFICE VISIT (OUTPATIENT)
Dept: INTERNAL MEDICINE | Age: 74
End: 2019-04-24
Payer: MEDICARE

## 2019-04-24 VITALS
HEART RATE: 80 BPM | HEIGHT: 64 IN | BODY MASS INDEX: 33.97 KG/M2 | SYSTOLIC BLOOD PRESSURE: 116 MMHG | DIASTOLIC BLOOD PRESSURE: 80 MMHG | WEIGHT: 199 LBS

## 2019-04-24 DIAGNOSIS — R93.7 ABNORMAL MRI, SPINE: Primary | ICD-10-CM

## 2019-04-24 DIAGNOSIS — F32.9 CHRONIC MAJOR DEPRESSIVE DISORDER: ICD-10-CM

## 2019-04-24 DIAGNOSIS — M54.5 LOW BACK PAIN, UNSPECIFIED BACK PAIN LATERALITY, UNSPECIFIED CHRONICITY, WITH SCIATICA PRESENCE UNSPECIFIED: Primary | ICD-10-CM

## 2019-04-24 PROCEDURE — 99213 OFFICE O/P EST LOW 20 MIN: CPT | Performed by: NURSE PRACTITIONER

## 2019-04-24 RX ORDER — ACETAMINOPHEN AND CODEINE PHOSPHATE 300; 30 MG/1; MG/1
1 TABLET ORAL EVERY 4 HOURS PRN
Qty: 30 TABLET | Refills: 0 | Status: SHIPPED | OUTPATIENT
Start: 2019-04-24 | End: 2019-09-05 | Stop reason: SDUPTHER

## 2019-04-24 RX ORDER — DULOXETIN HYDROCHLORIDE 30 MG/1
30 CAPSULE, DELAYED RELEASE ORAL DAILY
Qty: 90 CAPSULE | Refills: 2 | Status: ON HOLD | OUTPATIENT
Start: 2019-04-24 | End: 2019-07-23

## 2019-04-24 NOTE — PROGRESS NOTES
-0.2  hip 1.) T -1.6 spine, T -0.2 hip, 05/07 2.) Repeat DEXA scan, 10/09, with -1.3 spine, T -0.1 hip. 3.) DEXA, 04/12, T -1.4 spine, T -0.0 hip. 4.) Treated with Fosamax x 8 yrs, discontinued 01/12 repeat DEXA 10/14 FRAX 2.4% 10 year risk at hip    Peripheral neuropathy     with mild to moderate sensorimotor peripheral polyneuropathy noted on EMG, 11/11    Tobacco abuse     quit 10/11    Vitamin D deficiency        Past Surgical History:   Procedure Laterality Date    APPENDECTOMY      BREAST BIOPSY Left 11-12-14    US guided brest bx - benign    BREAST LUMPECTOMY Left 02/16/2015    with needle placement- MUCINOUS CARCINOMA- shw    COLONOSCOPY  02/28/03    normal-reed    COLONOSCOPY  10/30/98    normal-reed    COLONOSCOPY  2008    normal, [facility]    COLONOSCOPY  2/2015    6 cm polyp-benign, repeat 5yrs- dennis    EYE SURGERY      FOOT SURGERY Right 1990's, 2002    [facility] [place]     HYSTERECTOMY      WITH BILATERAL SALPINGO OOPHORECTOMY    KNEE ARTHROSCOPY  2011    RIGHT KNEE    LASIK Bilateral     LYMPH NODE BIOPSY Left [id number]    left sentinal node biopsy    TUBAL LIGATION  08/74       Current Outpatient Medications on File Prior to Visit   Medication Sig Dispense Refill    ibuprofen (ADVIL;MOTRIN) 800 MG tablet Take 800 mg by mouth 2 times daily as needed for Pain      diclofenac sodium 1 % GEL Apply topically as needed for Pain 100 g 1    zaleplon (SONATA) 10 MG capsule Take 1 capsule by mouth nightly as needed (sleep). . 90 capsule 1    vitamin D (CHOLECALCIFEROL) 1000 UNIT TABS tablet Take 1,000 Units by mouth daily      Handicap Placard MISC by Does not apply route EXP: 2 years 1 each 0    magnesium (MAGNESIUM-OXIDE) 250 MG TABS tablet Take 250 mg by mouth daily      aspirin 81 MG tablet Take 81 mg by mouth daily      B Complex Vitamins (VITAMIN B COMPLEX PO) Take by mouth daily      BLACK COHOSH HOT FLASH RELIEF PO Take 1-2 tablets by mouth daily as needed      Calcium Carbonate-Vitamin D (CALCIUM 600 + D PO)   Take 1 tablet by mouth daily        No current facility-administered medications on file prior to visit. Social History     Socioeconomic History    Marital status:      Spouse name: Not on file    Number of children: 2    Years of education: Not on file    Highest education level: Not on file   Occupational History    Not on file   Social Needs    Financial resource strain: Not on file    Food insecurity:     Worry: Not on file     Inability: Not on file    Transportation needs:     Medical: Not on file     Non-medical: Not on file   Tobacco Use    Smoking status: Former Smoker     Packs/day: 0.50     Years: 10.00     Pack years: 5.00     Last attempt to quit: 2014     Years since quittin.9    Smokeless tobacco: Never Used    Tobacco comment: quit 10/11 - smokes some days   Substance and Sexual Activity    Alcohol use: Yes     Alcohol/week: 0.0 oz     Comment: 1 drink a week    Drug use: No    Sexual activity: Not on file   Lifestyle    Physical activity:     Days per week: Not on file     Minutes per session: Not on file    Stress: Not on file   Relationships    Social connections:     Talks on phone: Not on file     Gets together: Not on file     Attends Jainism service: Not on file     Active member of club or organization: Not on file     Attends meetings of clubs or organizations: Not on file     Relationship status: Not on file    Intimate partner violence:     Fear of current or ex partner: Not on file     Emotionally abused: Not on file     Physically abused: Not on file     Forced sexual activity: Not on file   Other Topics Concern    Not on file   Social History Narrative    She works ar [employer] on [employer]. 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 She has 2 adult sons, 4 grandkids.        Review of Systems   Constitutional: Negative for activity change, appetite change, chills, fatigue, fever and unexpected weight change. HENT: Negative for congestion, dental problem, ear discharge, ear pain, facial swelling, hearing loss, postnasal drip, rhinorrhea, sinus pressure, sore throat and trouble swallowing. Eyes: Negative for pain and visual disturbance. Respiratory: Negative for cough, chest tightness, shortness of breath and wheezing. Cardiovascular: Negative for chest pain, palpitations and leg swelling. Gastrointestinal: Negative for abdominal pain, blood in stool, constipation, diarrhea, nausea and vomiting. Endocrine: Negative for cold intolerance, heat intolerance and polyuria. Genitourinary: Negative for difficulty urinating. Musculoskeletal: Positive for arthralgias and back pain. Negative for gait problem, myalgias, neck pain and neck stiffness. Skin: Negative for color change, rash and wound. Neurological: Negative for dizziness, tremors, seizures, weakness, light-headedness, numbness and headaches. Psychiatric/Behavioral: Negative for confusion, hallucinations, self-injury, sleep disturbance and suicidal ideas. The patient is not nervous/anxious. Irritable at times       Physical Exam   Constitutional: She is oriented to person, place, and time. She appears well-developed and well-nourished. No distress. HENT:   Head: Normocephalic and atraumatic. Right Ear: External ear normal.   Left Ear: External ear normal.   Eyes: Right eye exhibits no discharge. Left eye exhibits no discharge. Neck: No tracheal deviation present. Cardiovascular: Normal rate and normal heart sounds. Pulmonary/Chest: Effort normal. No respiratory distress. Musculoskeletal:        Lumbar back: She exhibits tenderness, bony tenderness and pain. She exhibits normal range of motion, no swelling, no edema, no laceration and no spasm. Neurological: She is alert and oriented to person, place, and time.  No cranial nerve deficit. Coordination normal.   Skin: Skin is warm and dry. No rash noted. She is not diaphoretic. No pallor. Psychiatric: She has a normal mood and affect. Her behavior is normal. Judgment and thought content normal.   Frustrated with ongoing back pain   Nursing note and vitals reviewed. MRI OF THE LUMBAR SPINE WITHOUT CONTRAST, 4/3/2019 2:05 pm       TECHNIQUE:   Multiplanar multisequence MRI of the lumbar spine was performed without the   administration of intravenous contrast.       COMPARISON:   None       HISTORY:   ORDERING SYSTEM PROVIDED HISTORY: Low back pain, unspecified back pain   laterality, unspecified chronicity, with sciatica presence unspecified   TECHNOLOGIST PROVIDED HISTORY:   Ordering Physician Provided Reason for Exam:  lower back pain that radiates   down right buttock into leg and toes   Acuity: Unknown   Type of Exam: Unknown   Additional signs and symptoms: Low back pain, unspecified back pain   laterality, unspecified chronicity, with sciatica presence unspecified M54.5   (ICD-10-CM); Right leg pain M79.604 (ICD-10-CM)   Relevant Medical/Surgical History: xray 11-20-18       FINDINGS:   BONES/ALIGNMENT: Vertebral heights are normal.  Bone marrow signal is normal.   There is grade 1 anterolisthesis of L5 on S1.  There are mild degenerative   disc changes from L3-S1.  There is vacuum disc phenomena from L4-S1.       SPINAL CORD: The conus terminates normally.       SOFT TISSUES: No paraspinal mass identified.       L1-L2: There is no significant disc herniation, spinal canal stenosis or   neural foraminal narrowing.       L2-L3: Right foraminal disc protrusion causes mild right foraminal stenosis.       L3-L4: Disc bulge, facet and ligament flavum hypertrophy cause mild thecal   sac and mild bilateral foraminal stenosis.       L4-L5: Disc bulge, facet and ligament flavum hypertrophy cause moderate   thecal sac and moderate bilateral foraminal stenosis.       L5-S1: Disc bulge, facet and ligament flavum hypertrophy cause mild thecal   sac, moderate right and severe left foraminal stenosis.  There are bilateral   pars interarticularis defects.           Impression   L5-S1 spondylolisthesis.       Multilevel foraminal stenosis.           Vitals:    04/24/19 1428   BP: 116/80   Site: Left Upper Arm   Position: Sitting   Cuff Size: Large Adult   Pulse: 80   Weight: 199 lb (90.3 kg)   Height: 5' 3.5\" (1.613 m)       Assessment:  1. Chronic major depressive disorder  Improved on Cymbalta. Continue to monitor. Refills supplied  - DULoxetine (CYMBALTA) 30 MG extended release capsule; Take 1 capsule by mouth daily  Dispense: 90 capsule; Refill: 2  - acetaminophen-codeine (TYLENOL/CODEINE #3) 300-30 MG per tablet; Take 1 tablet by mouth every 4 hours as needed for Pain for up to 7 days. Intended supply: 7 days. Take lowest dose possible to manage pain  Dispense: 30 tablet; Refill: 0    2. Low back pain, unspecified back pain laterality, unspecified chronicity, with sciatica presence unspecified  Referral to orthopedics. Patient declines pain management for injections. MRI reviewed with patient. Physical therapy worsening pain. Plan:  As noted above. Follow up for routine visit. Call sooner with concerns prior.     Electronically signed by NICOLASA Lee CNP on 4/24/2019 at 4:44 PM

## 2019-04-24 NOTE — PROGRESS NOTES
I have reviewed and agree to the content of the note written by the PTA.   Electronically signed by Amari Manzo PT [id number]

## 2019-04-25 ENCOUNTER — TELEPHONE (OUTPATIENT)
Dept: INTERNAL MEDICINE | Age: 74
End: 2019-04-25

## 2019-04-25 ENCOUNTER — APPOINTMENT (OUTPATIENT)
Dept: PHYSICAL THERAPY | Age: 74
End: 2019-04-25
Payer: MEDICARE

## 2019-04-25 ASSESSMENT — ENCOUNTER SYMPTOMS
COUGH: 0
ABDOMINAL PAIN: 0
CONSTIPATION: 0
SHORTNESS OF BREATH: 0
DIARRHEA: 0
CHEST TIGHTNESS: 0
TROUBLE SWALLOWING: 0
NAUSEA: 0
COLOR CHANGE: 0
FACIAL SWELLING: 0
BLOOD IN STOOL: 0
EYE PAIN: 0
RHINORRHEA: 0
WHEEZING: 0
BACK PAIN: 1
SINUS PRESSURE: 0
VOMITING: 0
SORE THROAT: 0

## 2019-04-25 NOTE — TELEPHONE ENCOUNTER
Patient calling and would like a referral to Dr. Williams Hinojosa in [facility]

## 2019-05-23 ENCOUNTER — TELEPHONE (OUTPATIENT)
Dept: PAIN MANAGEMENT | Age: 74
End: 2019-05-23

## 2019-05-23 NOTE — DISCHARGE SUMMARY
[facility]    [x] [place]  Phone: [phone]  Fax: [phone]      [] [place]  Phone: [phone]  Fax: [phone]    Physical Therapy Discharge Note  Date: 2019        Patient Name:  Orestes Arauz    :  1945  MRN: [id number]  Restrictions/Precautions:      Medical/Treatment Diagnosis Information:  ·   Diagnosis: Lumbar spondylosis, R throchanteric bursitis  · Treatment Diagnosis: Lumbar spondylosis  ·    Insurance/Certification information:    [employer] medicare  Physician Information:     Karoline Wynn 18 of care signed (Y/N):    Visit# / total visits:  9  Pain level: 310     Time Period for Report:   Cancels/No-shows to date:     Plan of Care/Treatment to date:  [x] Therapeutic Exercise    [] Modalities:  [] Therapeutic Activity     [] Ultrasound  [x] Electrical Stimulation  [] Gait Training      [] Cervical Traction    [] Lumbar Traction  [] Neuromuscular Re-education  [] Cold/hotpack [] Iontophoresis  [x] Instruction in HEP      Other:  [x] Manual Therapy       []    [] Aquatic Therapy       []                    ?           Subjective:    Pain this date rated 1-2/10 through right lateral ankle and gluteal region      Objective:   Pain has mostly centralized to the back            Plan:    d/c       Goals:    Short term goals  Time Frame for Short term goals: 1 week  Short term goal 1: Start HEP for lumbar spine     Long term goals  Time Frame for Long term goals : 4 weeks  Long term goal 1: Pain controlled 2-3/10 to allow regular ADL  Long term goal 2: Gait deviations reduced 50%   Long term goal 3: Able to sleep thru 3/4 of the night   Long term goal 4: Able to stand 20-30 min              Percentage of Goals Met: 80            Discharge Prognosis: [] Excellent [x] Good [] Fair  [] Poor     Goal Status:  [] Achieved [x] Partially Achieved  [] Not Achieved       Electronically signed by:  Liseth Ceja PT

## 2019-05-23 NOTE — TELEPHONE ENCOUNTER
Received a referral from Dr. Anirudh Anaya office. Attempted to contact the patient, left VM to call back.   Needs scheduled for New patient appt w/ Pain Management   Referral information is at [employer] external folder

## 2019-06-10 ENCOUNTER — OFFICE VISIT (OUTPATIENT)
Dept: PAIN MANAGEMENT | Age: 74
End: 2019-06-10
Payer: MEDICARE

## 2019-06-10 VITALS
SYSTOLIC BLOOD PRESSURE: 126 MMHG | OXYGEN SATURATION: 96 % | WEIGHT: 198 LBS | HEIGHT: 63 IN | DIASTOLIC BLOOD PRESSURE: 62 MMHG | HEART RATE: 74 BPM | BODY MASS INDEX: 35.08 KG/M2

## 2019-06-10 DIAGNOSIS — M54.16 LUMBAR RADICULOPATHY: Primary | ICD-10-CM

## 2019-06-10 DIAGNOSIS — M51.36 DDD (DEGENERATIVE DISC DISEASE), LUMBAR: ICD-10-CM

## 2019-06-10 PROCEDURE — 99204 OFFICE O/P NEW MOD 45 MIN: CPT | Performed by: PHYSICAL MEDICINE & REHABILITATION

## 2019-06-10 NOTE — PATIENT INSTRUCTIONS
judgment following the procedure and driving a vehicle within 24 hours after the sedation could be dangerous. 6.  Wear simple loose clothing, which can be easily changed. 7.  Leave jewelry (including rings) and other valuables at home. 8.  You will be asked to sign several forms prior to surgery; patients under the age of 25 must have a parent or legal guardian sign the permit to be able to do the procedure. 9.  You must have finished any antibiotic prescribed for recent infections. If required, please take pre-procedure antibiotic or other pre-procedure medications as instructed. 10. Bring inhalers and pain medications with you to your procedure. 11. Bring your MRI/CT films if they were done outside of the [facility]. 12. If you should develop a cold, sore throat, cough, fever or other new indication of illness or infection, or are started on antibiotics within 2 weeks of the scheduled procedure, please notify the [facility] office as early as possible at [phone].

## 2019-06-10 NOTE — PROGRESS NOTES
PAIN MANAGEMENTNEW PATIENT CONSULTATION  6/10/19    Briana Haley  1945    ReferringProvider:  No referring provider defined for this encounter. Primary Care Physician:  Wilver Rogers MD  [address] [place] [address]    HPIinformation obtained from the patient as well as the Comprehensive Pain ManagementHealth Questionnaire filled out by the patient. The questionnaire will be scannedinto the electronic chart and PMH, PSH, meds, and allergies will be updates accordingly. Subjective:       CHIEF COMPLAINT:  This is a75 y.o. female patientwho presents with a complaint of New Patient and Lower Back Pain (radiates down to right leg and down to right ankle. Ref by Salina escobar rt L4-L5 TFE )      PAIN HPI:    HPI   Location: Back  Location Modifier: Right  Severity of Pain: 8  Duration of Pain: Persistent  Frequency of Pain: Constant  Aggravating Factors: -, Stretching, Bending, Straightening, Walking, Stairs, Exercise, Kneeling and Squatting  Limiting Behavior: yes - . Relieving Factors: Heat, Cold and Medication -  Result of Injury: no  Work Related Injury: no  [place] of Worker Compensation Case: no      Prior evaluation:    Previous lower back problems:No    Previous workup: No   History of surgery in painful area:No    Previous pain medication trials have included:       Opioids: -     NSAID's:-     Muscle relaxants: -     Neuropathicpain meds: -    Previous Pain Management Physician: No   Nerve blocks, spinal injections:No   Typeof Pain Intervention -. Date of last Pain Intervention . Was there pain relief from Pain Intervention: -. How long was your pain relief from the Pain Intervention 1days.      Sleep:    Sleep disturbance: No   Difficultyfalling asleep:  No   Feels fatiguedmuch of the time: No   Wakes uprested: No   Awakens in themiddle of the night: No   Takes sleepingmedication: No    Mental health:    Patient feels - secondary to their current pain problems as described above.   H/O depression and anxiety: No   Patient is not seeing psychologist orpsychiatrist   Abuse history? No    Employed? No  Alcohol use?: No  Tobacco use?: No  Marijuana use?: No  Illicit drug use?: No    Imaging: Reviewed available imagingin our system with the patient. results found. L4-5 5-S1 degenerative changes disc         Referring physician records reviewed. Review of Systems    Allergies   Allergen Reactions    Amoxicillin-Pot Clavulanate     Crestor [Rosuvastatin]      MUSCLE PAIN/ACHES      Floxin [Ofloxacin]     Influenza Vaccines     Lipitor      MUSCLE PAIN/ACHES      Meloxicam Other (See Comments)     constipation    Pravastatin      MUSCLE PAIN/ACHES    Prednisone      Lost eyelashes       Red Yeast Rice [[place] Purpureus Went Yeast]      POORLY TOLERATED    Zetia [Ezetimibe]      CONSTIPATION      Zocor [Simvastatin]      MUSCLE PAIN/ACHES    Gabapentin Rash       Outpatient Medications Prior to Visit   Medication Sig Dispense Refill    DULoxetine (CYMBALTA) 30 MG extended release capsule Take 1 capsule by mouth daily 90 capsule 2    ibuprofen (ADVIL;MOTRIN) 800 MG tablet Take 800 mg by mouth 2 times daily as needed for Pain      diclofenac sodium 1 % GEL Apply topically as needed for Pain 100 g 1    zaleplon (SONATA) 10 MG capsule Take 1 capsule by mouth nightly as needed (sleep). . 90 capsule 1    vitamin D (CHOLECALCIFEROL) 1000 UNIT TABS tablet Take 1,000 Units by mouth daily      Handicap Placard MISC by Does not apply route EXP: 2 years 1 each 0    magnesium (MAGNESIUM-OXIDE) 250 MG TABS tablet Take 250 mg by mouth daily      aspirin 81 MG tablet Take 81 mg by mouth daily      B Complex Vitamins (VITAMIN B COMPLEX PO) Take by mouth daily      BLACK COHOSH HOT FLASH RELIEF PO Take 1-2 tablets by mouth daily as needed      Calcium Carbonate-Vitamin D (CALCIUM 600 + D PO)   Take 1 tablet by mouth daily        No facility-administered medications prior to visit. Past Medical History:   Diagnosis Date    Breast CA ([facility] [address]     2/15 s/p XRT & Dr Megan Martins following    Breast cancer [facility])     COPD (chronic obstructive pulmonary disease) ([facility] [address] 03/04    mild obstructive defect on PFT's    Depression     Depression     Dyslipidemia     Failed 6 meds 10 yr risk 10% calc 12/15    Dyslipidemia     Family hx of colon cancer     Hyperplastic colon polyp     Colonoscopy 2/16/15 with repeat recommended 2/16/2020    IFG (impaired fasting glucose) 5/9/2017    Insomnia     MGUS (monoclonal gammopathy of unknown significance)     0.64 grams/decilier IgG lambda, 04/12. 1.) UPUP negative 04/12. Stable July 2014    Monoclonal gammopathy     Osteoarthritis     particularly in the hands    Osteopenia     DEXA, 01/11/04, T -1.7 spine, T -0.2  hip 1.) T -1.6 spine, T -0.2 hip, 05/07 2.) Repeat DEXA scan, 10/09, with -1.3 spine, T -0.1 hip. 3.) DEXA, 04/12, T -1.4 spine, T -0.0 hip.  4.) Treated with Fosamax x 8 yrs, discontinued 01/12 repeat DEXA 10/14 FRAX 2.4% 10 year risk at hip    Peripheral neuropathy     with mild to moderate sensorimotor peripheral polyneuropathy noted on EMG, 11/11    Peripheral neuropathy     Tobacco abuse     quit 10/11    Vitamin D deficiency     Vitamin D deficiency        Past Surgical History:   Procedure Laterality Date    APPENDECTOMY      BREAST BIOPSY Left 11-12-14    US guided brest bx - benign    BREAST LUMPECTOMY Left 02/16/2015    with needle placement- MUCINOUS CARCINOMA- shw    COLONOSCOPY  02/28/03    normal-reed    COLONOSCOPY  10/30/98    normal-reed    COLONOSCOPY  2008    normal, family hx colon cancer- [facility]    COLONOSCOPY  2/2015    6 cm polyp-benign, repeat 5yrs- dennis    EYE SURGERY      FOOT SURGERY Right 1990's, 2002    [facility]o [facility]     HYSTERECTOMY      WITH BILATERAL SALPINGO OOPHORECTOMY    KNEE ARTHROSCOPY  2011    RIGHT KNEE    LASIK Bilateral     LYMPH NODE BIOPSY Left [id number]    left sentinal node biopsy Cuff Size: Medium Adult   Pulse: 74   SpO2: 96%   Weight: 198 lb (89.8 kg)   Height: 5' 3\" (1.6 m)     Pain Score:   4    Physical Exam    Ortho Exam     Labs:   Lab Results   Component Value Date    WBC 5.4 03/27/2019    HGB 11.9 (L) 03/27/2019    HCT 36.3 03/27/2019     03/27/2019    CHOL 275 (H) 11/21/2018    TRIG 100 11/21/2018    HDL 85 11/21/2018    ALT 29 03/27/2019    AST 27 03/27/2019     03/27/2019    K 5.3 03/27/2019     03/27/2019    CREATININE 0.86 03/27/2019    BUN 29 (H) 03/27/2019    CO2 25 03/27/2019    TSH 3.18 03/27/2019    INR 1.0 (L) 11/11/2014    GLUF 91 11/13/2017    LABA1C 5.9 11/21/2018       Assessment: This is a 75 y.o. female with the following diagnosis:     Pain Diagnoses:  1. Lumbar radiculopathy    2. DDD (degenerative disc disease), lumbar        Medical/ Psychological Comorbidities:  As listed in the past medical and surgical history    Functional Limitations secondary to the above problems:  Chronic painlimits function and quality of life    Plan:   R L4,5 TFE x2    1. Meds:   New Prescriptions    No medications on file      No orders of the defined types were placed in this encounter. Controlled Substances Monitoring:    OARRS report was reviewed for [place], [place]. Pt educated about the risks of taking opiates, including increasedsedation, constipation, slowed breathing, tolerance, dependence, and addiction. No orders of the defined types were placed in this encounter. No follow-ups on file. The patient expressed understanding of the above assessment and plan. Totaltime spent face to face with patient was 25 minutes inwhich  50% or more of the time was spent in counseling, education about risk andbenefits of the above plan, and coordination of care.

## 2019-06-21 ENCOUNTER — HOSPITAL ENCOUNTER (OUTPATIENT)
Age: 74
Setting detail: OUTPATIENT SURGERY
Discharge: HOME | End: 2019-06-21
Attending: PHYSICAL MEDICINE & REHABILITATION | Admitting: PHYSICAL MEDICINE & REHABILITATION
Payer: MEDICARE

## 2019-06-21 ENCOUNTER — APPOINTMENT (OUTPATIENT)
Dept: GENERAL RADIOLOGY | Age: 74
End: 2019-06-21
Attending: PHYSICAL MEDICINE & REHABILITATION
Payer: MEDICARE

## 2019-06-21 VITALS
HEIGHT: 63 IN | WEIGHT: 198 LBS | TEMPERATURE: 97 F | DIASTOLIC BLOOD PRESSURE: 68 MMHG | RESPIRATION RATE: 18 BRPM | BODY MASS INDEX: 35.08 KG/M2 | OXYGEN SATURATION: 96 % | HEART RATE: 56 BPM | SYSTOLIC BLOOD PRESSURE: 151 MMHG

## 2019-06-21 PROBLEM — M54.16 LUMBAR RADICULITIS: Status: ACTIVE | Noted: 2019-06-21

## 2019-06-21 PROBLEM — M47.816 LUMBAR SPONDYLOSIS: Status: ACTIVE | Noted: 2019-06-21

## 2019-06-21 PROCEDURE — 2500000003 HC RX 250 WO HCPCS: Performed by: PHYSICAL MEDICINE & REHABILITATION

## 2019-06-21 PROCEDURE — 3600000056 HC PAIN LEVEL 4 BASE: Performed by: PHYSICAL MEDICINE & REHABILITATION

## 2019-06-21 PROCEDURE — 2709999900 HC NON-CHARGEABLE SUPPLY: Performed by: PHYSICAL MEDICINE & REHABILITATION

## 2019-06-21 PROCEDURE — 64484 NJX AA&/STRD TFRM EPI L/S EA: CPT | Performed by: PHYSICAL MEDICINE & REHABILITATION

## 2019-06-21 PROCEDURE — 64483 NJX AA&/STRD TFRM EPI L/S 1: CPT | Performed by: PHYSICAL MEDICINE & REHABILITATION

## 2019-06-21 PROCEDURE — 6360000002 HC RX W HCPCS: Performed by: PHYSICAL MEDICINE & REHABILITATION

## 2019-06-21 PROCEDURE — 6360000004 HC RX CONTRAST MEDICATION: Performed by: PHYSICAL MEDICINE & REHABILITATION

## 2019-06-21 PROCEDURE — 7100000010 HC PHASE II RECOVERY - FIRST 15 MIN: Performed by: PHYSICAL MEDICINE & REHABILITATION

## 2019-06-21 PROCEDURE — 2580000003 HC RX 258: Performed by: PHYSICAL MEDICINE & REHABILITATION

## 2019-06-21 PROCEDURE — 3209999900 FLUORO FOR SURGICAL PROCEDURES

## 2019-06-21 RX ORDER — BUPIVACAINE HYDROCHLORIDE 5 MG/ML
INJECTION, SOLUTION EPIDURAL; INTRACAUDAL PRN
Status: DISCONTINUED | OUTPATIENT
Start: 2019-06-21 | End: 2019-06-21 | Stop reason: ALTCHOICE

## 2019-06-21 RX ORDER — DEXAMETHASONE SODIUM PHOSPHATE 10 MG/ML
INJECTION INTRAMUSCULAR; INTRAVENOUS PRN
Status: DISCONTINUED | OUTPATIENT
Start: 2019-06-21 | End: 2019-06-21 | Stop reason: ALTCHOICE

## 2019-06-21 RX ORDER — 0.9 % SODIUM CHLORIDE 0.9 %
VIAL (ML) INJECTION PRN
Status: DISCONTINUED | OUTPATIENT
Start: 2019-06-21 | End: 2019-06-21 | Stop reason: ALTCHOICE

## 2019-06-21 ASSESSMENT — PAIN SCALES - GENERAL: PAINLEVEL_OUTOF10: 0

## 2019-06-21 ASSESSMENT — PAIN - FUNCTIONAL ASSESSMENT: PAIN_FUNCTIONAL_ASSESSMENT: 0-10

## 2019-06-21 ASSESSMENT — PAIN DESCRIPTION - PAIN TYPE: TYPE: CHRONIC PAIN

## 2019-06-21 NOTE — OP NOTE
TRANSFORAMINAL EPIDURAL STEROID INJECTION    6/21/19    Surgeon: Todd Brito MD    Pre-operative Diagnosis:   Patient Active Problem List   Diagnosis Code    Insomnia G47.00    Dyslipidemia E78.5    Osteoarthritis M19.90    Menopausal symptoms N95.1    Osteopenia M85.80    COPD (chronic obstructive pulmonary disease) ([facility]) J44.9    MGUS (monoclonal gammopathy of unknown significance) D47.2    Peripheral neuropathy G62.9    Vitamin D deficiency E55.9    Breast CA ([facility]) C50.919    Hallux limitus of right foot M20.5X1    Chronic major depressive disorder F32.9    Primary osteoarthritis of both hands M19.041, M19.042    IFG (impaired fasting glucose) R73.01    History of hand x-ray-suggesting inflammatory arthritis Z92.89    Hand arthritis M19.049    Lumbar radiculitis M54.16    Lumbar spondylosis M47.816       Post-operative Diagnosis: Same    Assistants: none    This is a 75 y.o. female patient with pain in the Back, right leg. Previous treatment and examination findings are noted in the H&P.RL4,5 transforaminal epidural injection has been requested for diagnostic and therapeutic reasons. Conservative treatment was ineffective i.e.: ice, NSAIDS, rest, narcotic medication, chiropractic care, physical therapy and message therapy. Patient is unable to perform the following ADL's: ambulating and grooming     Pain Assessment: 0-10        Pain Orientation: Lower, Right  Pain Location: Leg, Hip       Last Plain films: 2019      EXAMINATION:  1. Rl4,5 transforaminal radiculogram/epidurogram.   2. Rl4,5 transforaminal epidural anesthetic injection. 3. Rl4,5 transforaminal epidural steroid injection. CONSENT: Written consent was obtained from the patient on preprinted consent form after explaining the procedure, indications, potential complications and outcomes. Alternative treatments were also discussed.     DISCUSSION: The patient was sterilely prepped and draped in the usual fashion in the prone position. Time out was verified for correct patient, side, level and procedure. SEDATION:   No conscious sedation was performed during the procedure. The patient remained awake and conversed throughout the procedure. The patient underwent pulse oximetry and blood pressure monitoring independently by a trained observer, as well as by a physician. PROCEDURE:  Under image-intensifier control, a 22 gauge needle x 5 inch spinal needle was guided successfully into the epidural space employing a posterior lateral/oblique approach. Needle aspiration was negative for heme or CSF. Instillation of  .5 mL of Omnipaque 240 contrast medium opacified the spinal nerve and demonstrated contiguous flow into theRl 4  epidural space. No vascular spread was noted. Digital subtraction was not employed to evaluate for vascular spread. The patient was monitored for any untoward reaction to contrast medium before proceeding with procedure #2. The patient did not report pain reproduction in a concordant distribution. Following needle position verification, a test dose of .5 mL of sterile lidocaine 0.5% was administered and patient monitored for any adverse effects. Then, 1 mL of   was instilled into the epidural space and the patient's response was again monitored. Finally, Dexamethasone (Decadron 10 mg/mL) 1 ml of 0.5% bupivacaine was then instilled. The patient's response was again monitored. The spinal needle was removed. Instillation of  .5 mL of Omnipaque 240 contrast medium opacified the spinal nerve and demonstrated contiguous flow into theRL 5  epidural space. No vascular spread was noted. Digital subtraction was not employed to evaluate for vascular spread. The patient was monitored for any untoward reaction to contrast medium before proceeding with procedure #2. The patient did not report pain reproduction in a concordant distribution.     Following needle position verification, a test dose of .5 mL of

## 2019-06-21 NOTE — H&P
Signed        Expand All Collapse All         Show:Clear all  [x]Manual[x]Template[]Copied    Added by:  [x]Igor Conde MD      []Donn for details      PAIN MANAGEMENTNEW PATIENT CONSULTATION  6/10/19     Clifm Primrose  1945     ReferringProvider:  No referring provider defined for this encounter. Primary Care Physician:  Arely Robin MD  [address] [place] [address]     HPIinformation obtained from the patient as well as the Comprehensive Pain ManagementHealth Questionnaire filled out by the patient. The questionnaire will be scannedinto the electronic chart and PMH, PSH, meds, and allergies will be updates accordingly. Subjective:       CHIEF COMPLAINT:  This is a75 y.o. female patientwho presents with a complaint of New Patient and Lower Back Pain (radiates down to right leg and down to right ankle. Ref by Yates January wants rt L4-L5 TFE )        PAIN HPI:     HPI   Location: Back  Location Modifier: Right  Severity of Pain: 8  Duration of Pain: Persistent  Frequency of Pain: Constant  Aggravating Factors: -, Stretching, Bending, Straightening, Walking, Stairs, Exercise, Kneeling and Squatting  Limiting Behavior: yes - . Relieving Factors: Heat, Cold and Medication -  Result of Injury: no  Work Related Injury: no  [place] of Worker Compensation Case: no        Prior evaluation:               Previous lower back problems:No               Previous workup: No              History of surgery in painful area:No     Previous pain medication trials have included:                             Opioids: -                 NSAID's:-                 Muscle relaxants: -                 Neuropathicpain meds: -     Previous Pain Management Physician: No              Nerve blocks, spinal injections:No              Typeof Pain Intervention -. Date of last Pain Intervention . Was there pain relief from Pain Intervention: -.                How long was your pain relief from the Pain Intervention 1days. Sleep:               Sleep disturbance: No              Difficultyfalling asleep:  No              Feels fatiguedmuch of the time: No              Wakes uprested: No              Awakens in themiddle of the night: No              Takes sleepingmedication: No     Mental health:               Patient feels - secondary to their current pain problems as described above. H/O depression and anxiety: No              Patient is not seeing psychologist orpsychiatrist              Abuse history? No     Employed? No  Alcohol use?: No  Tobacco use?: No  Marijuana use?: No  Illicit drug use?: No     Imaging: Reviewed available imagingin our system with the patient. results found. L4-5 5-S1 degenerative changes disc            Referring physician records reviewed. Review of Systems           Allergies   Allergen Reactions    Amoxicillin-Pot Clavulanate      Crestor [Rosuvastatin]         MUSCLE PAIN/ACHES       Floxin [Ofloxacin]      Influenza Vaccines      Lipitor         MUSCLE PAIN/ACHES       Meloxicam Other (See Comments)       constipation    Pravastatin         MUSCLE PAIN/ACHES    Prednisone         Lost eyelashes        Red Yeast Rice [[place] Purpureus Went Yeast]         POORLY TOLERATED    Zetia [Ezetimibe]         CONSTIPATION       Zocor [Simvastatin]         MUSCLE PAIN/ACHES    Gabapentin Rash         Medications Prior to Visit          Outpatient Medications Prior to Visit   Medication Sig Dispense Refill    DULoxetine (CYMBALTA) 30 MG extended release capsule Take 1 capsule by mouth daily 90 capsule 2    ibuprofen (ADVIL;MOTRIN) 800 MG tablet Take 800 mg by mouth 2 times daily as needed for Pain        diclofenac sodium 1 % GEL Apply topically as needed for Pain 100 g 1    zaleplon (SONATA) 10 MG capsule Take 1 capsule by mouth nightly as needed (sleep). . 90 capsule 1    vitamin D (CHOLECALCIFEROL) 1000 UNIT TABS tablet Take 1,000 Units by mouth daily        Handicap Placard MISC by Does not apply route EXP: 2 years 1 each 0    magnesium (MAGNESIUM-OXIDE) 250 MG TABS tablet Take 250 mg by mouth daily        aspirin 81 MG tablet Take 81 mg by mouth daily        B Complex Vitamins (VITAMIN B COMPLEX PO) Take by mouth daily        BLACK COHOSH HOT FLASH RELIEF PO Take 1-2 tablets by mouth daily as needed        Calcium Carbonate-Vitamin D (CALCIUM 600 + D PO)    Take 1 tablet by mouth daily           No facility-administered medications prior to visit. Past Medical History        Past Medical History:   Diagnosis Date    Breast CA ([facility] [address]       2/15 s/p XRT & Dr Tom Mao following    Breast cancer [facility])      COPD (chronic obstructive pulmonary disease) ([facility] [address] 03/04     mild obstructive defect on PFT's    Depression      Depression      Dyslipidemia       Failed 6 meds 10 yr risk 10% calc 12/15    Dyslipidemia      Family hx of colon cancer      Hyperplastic colon polyp       Colonoscopy 2/16/15 with repeat recommended 2/16/2020    IFG (impaired fasting glucose) 5/9/2017    Insomnia      MGUS (monoclonal gammopathy of unknown significance)       0.64 grams/decilier IgG lambda, 04/12. 1.) UPUP negative 04/12. Stable July 2014    Monoclonal gammopathy      Osteoarthritis       particularly in the hands    Osteopenia       DEXA, 01/11/04, T -1.7 spine, T -0.2  hip 1.) T -1.6 spine, T -0.2 hip, 05/07 2.) Repeat DEXA scan, 10/09, with -1.3 spine, T -0.1 hip. 3.) DEXA, 04/12, T -1.4 spine, T -0.0 hip.  4.) Treated with Fosamax x 8 yrs, discontinued 01/12 repeat DEXA 10/14 FRAX 2.4% 10 year risk at hip    Peripheral neuropathy       with mild to moderate sensorimotor peripheral polyneuropathy noted on EMG, 11/11    Peripheral neuropathy      Tobacco abuse       quit 10/11    Vitamin D deficiency      Vitamin D deficiency              Past Surgical History         Past Surgical History:   Procedure Laterality Date    APPENDECTOMY        BREAST BIOPSY Left 14     US guided brest bx - benign    BREAST LUMPECTOMY Left 2015     with needle placement- MUCINOUS CARCINOMA- shw    COLONOSCOPY   03     normal-reed    COLONOSCOPY   10/30/98     normal-reed    COLONOSCOPY        normal, family hx colon cancer- al-jadda    COLONOSCOPY   2015     6 cm polyp-benign, repeat 5yrs- dennis    EYE SURGERY        FOOT SURGERY Right ,      [facility] [place]     HYSTERECTOMY         WITH BILATERAL SALPINGO OOPHORECTOMY    KNEE ARTHROSCOPY        RIGHT KNEE    LASIK Bilateral      LYMPH NODE BIOPSY Left [id number]     left sentinal node biopsy    TUBAL LIGATION               Family History         Family History   Problem Relation Age of Onset    Cancer Mother           COLON CANCER    Heart Attack Father           LATE 70'S    Diabetes Neg Hx      Breast Cancer Neg Hx      Cataracts Neg Hx      Glaucoma Neg Hx           Social History   Social History            Socioeconomic History    Marital status:        Spouse name: None    Number of children: 2    Years of education: None    Highest education level: None   Occupational History    None   Social Needs    Financial resource strain: None    Food insecurity:       Worry: None       Inability: None    Transportation needs:       Medical: None       Non-medical: None   Tobacco Use    Smoking status: Former Smoker       Packs/day: 0.50       Years: 10.00       Pack years: 5.00       Last attempt to quit: 2014       Years since quittin.1    Smokeless tobacco: Never Used    Tobacco comment: quit 10/11 - smokes some days   Substance and Sexual Activity    Alcohol use:  Yes       Alcohol/week: 0.0 oz       Comment: 1 drink a week    Drug use: No    Sexual activity: None   Lifestyle    Physical activity:       Days per week: None       Minutes per session: None    Stress: None   Relationships    Social connections: Medications    No orders of the defined types were placed in this encounter. Controlled Substances Monitoring:    [place] report was reviewed for [place], [place]. Pt educated about the risks of taking opiates, including increasedsedation, constipation, slowed breathing, tolerance, dependence, and addiction. No orders of the defined types were placed in this encounter. No follow-ups on file. The patient expressed understanding of the above assessment and plan. Totaltime spent face to face with patient was 25 minutes inwhich  50% or more of the time was spent in counseling, education about risk andbenefits of the above plan, and coordination of care.

## 2019-07-08 ENCOUNTER — TELEPHONE (OUTPATIENT)
Dept: PAIN MANAGEMENT | Age: 74
End: 2019-07-08

## 2019-07-08 ENCOUNTER — OFFICE VISIT (OUTPATIENT)
Dept: PAIN MANAGEMENT | Age: 74
End: 2019-07-08
Payer: MEDICARE

## 2019-07-08 VITALS
HEIGHT: 63 IN | RESPIRATION RATE: 14 BRPM | DIASTOLIC BLOOD PRESSURE: 62 MMHG | SYSTOLIC BLOOD PRESSURE: 120 MMHG | WEIGHT: 198.2 LBS | BODY MASS INDEX: 35.12 KG/M2

## 2019-07-08 DIAGNOSIS — M47.816 LUMBAR SPONDYLOSIS: Primary | ICD-10-CM

## 2019-07-08 DIAGNOSIS — G89.29 CHRONIC RIGHT-SIDED LOW BACK PAIN WITH RIGHT-SIDED SCIATICA: ICD-10-CM

## 2019-07-08 DIAGNOSIS — M54.16 LUMBAR RADICULITIS: ICD-10-CM

## 2019-07-08 DIAGNOSIS — M54.41 CHRONIC RIGHT-SIDED LOW BACK PAIN WITH RIGHT-SIDED SCIATICA: ICD-10-CM

## 2019-07-08 PROCEDURE — 99214 OFFICE O/P EST MOD 30 MIN: CPT | Performed by: NURSE PRACTITIONER

## 2019-07-08 RX ORDER — ACETAMINOPHEN 500 MG
1500 TABLET ORAL 2 TIMES DAILY
COMMUNITY
End: 2022-04-18

## 2019-07-08 NOTE — TELEPHONE ENCOUNTER
7/8/19                                                                                                                                                                         To: NICOLASA Girard CNP   [address] [place] [address]       From:  Austin Ruelas MD   Interventional Pain Mgmt Physician, [facility]    Re: Medication Hiatus for Interventional Pain/Spine Procedure for Dori Dobson    Dear NICOLASA Girard CNP                   I am recommending an injection for Dori Dobson to decrease their spine related pain. In order to perform this procedure, antiplatelet/anticoagulant medications will need to be held prior to the procedure. Please respond with one of the options below if you feel that it is safe for Dori Dobson to hold this medication and return this letter to me. Dori Dobson is taking: ASA    Medications that must be held prior to injections:     Antiplatelets Aspirin, Effient, Plavix,Ticlid,  Brilinta, Savaysa for 3-5 days   Anticoagulant: Coumadin, Eliquis, Lovenox, Heparin, Pradaxa, Xarelto for 5-7 days. All antiplatelets/anticoagulants must be held for 7 days for a spinal cord stimulator. .    Sincerely,       Electronically signed by NICOLASA Amanda CNP on 7/8/2019 at 11:50 AM    Austin Ruelas MD      ·  I agree with holding this medication for the time described above. ·  I agree with holding this medication for ____ days only. ·  I agree with holding this medication for ____ days, however pt must be on another anticoagulant, _______________.   · I  DO NOT agree with holding this medication

## 2019-07-08 NOTE — PATIENT INSTRUCTIONS
several forms prior to surgery; patients under the age of 25 must have a parent or legal guardian sign the permit to be able to do the procedure. 9.  You must have finished any antibiotic prescribed for recent infections. If required, please take pre-procedure antibiotic or other pre-procedure medications as instructed. 10. Bring inhalers and pain medications with you to your procedure. 11. Bring your MRI/CT films if they were done outside of the [facility]. 12. If you should develop a cold, sore throat, cough, fever or other new indication of illness or infection, or are started on antibiotics within 2 weeks of the scheduled procedure, please notify the [facility] office as early as possible at [phone].

## 2019-07-08 NOTE — PROGRESS NOTES
MUCINOUS CARCINOMA- shw    COLONOSCOPY  03    normal-reed    COLONOSCOPY  10/30/98    normal-reed    COLONOSCOPY      normal, family hx colon cancer- al-jadda    COLONOSCOPY  2015    6 cm polyp-benign, repeat 5yrs- dennis    EYE SURGERY      FOOT SURGERY Right s,     [facility] [place]     HYSTERECTOMY      WITH BILATERAL SALPINGO OOPHORECTOMY    KNEE ARTHROSCOPY  2011    RIGHT KNEE    LASIK Bilateral     LUMBAR SPINE SURGERY Right 2019    Right L4 L5 TRANSFORAMINAL performed by Alicia Cotter MD at [address] [place] [address] Left [id number]    left sentinal node biopsy    TUBAL LIGATION         Family History   Problem Relation Age of Onset    Cancer Mother         COLON CANCER    Heart Attack Father         LATE 70'S    Diabetes Neg Hx     Breast Cancer Neg Hx     Cataracts Neg Hx     Glaucoma Neg Hx        Social History     Socioeconomic History    Marital status:      Spouse name: None    Number of children: 2    Years of education: None    Highest education level: None   Occupational History    None   Social Needs    Financial resource strain: None    Food insecurity:     Worry: None     Inability: None    Transportation needs:     Medical: None     Non-medical: None   Tobacco Use    Smoking status: Former Smoker     Packs/day: 0.50     Years: 10.00     Pack years: 5.00     Last attempt to quit: 2014     Years since quittin.1    Smokeless tobacco: Never Used    Tobacco comment: quit 10/11 - smokes some days   Substance and Sexual Activity    Alcohol use:  Yes     Alcohol/week: 0.0 oz     Comment: 1 drink a week    Drug use: No    Sexual activity: None   Lifestyle    Physical activity:     Days per week: None     Minutes per session: None    Stress: None   Relationships    Social connections:     Talks on phone: None     Gets together: None     Attends Taoism service: None     Active member of club or organization: None     Attends transforaminal     Informed consent has not been obtained for procedure. Carina Licea  on blood thinners. Medications to hold have been reviewed with patient. Blood thinners must be held with permission from your cardiologist or primary care physician. A letter is  required to besent to PCP/Cardiologist regarding holding medications for procedure to decrease bleeding risk.              Carlos Garcia, APRN - CNP

## 2019-07-09 ASSESSMENT — ENCOUNTER SYMPTOMS
RESPIRATORY NEGATIVE: 1
BACK PAIN: 1

## 2019-07-23 ENCOUNTER — APPOINTMENT (OUTPATIENT)
Dept: GENERAL RADIOLOGY | Age: 74
End: 2019-07-23
Attending: PHYSICAL MEDICINE & REHABILITATION
Payer: MEDICARE

## 2019-07-23 ENCOUNTER — HOSPITAL ENCOUNTER (OUTPATIENT)
Age: 74
Setting detail: OUTPATIENT SURGERY
Discharge: HOME | End: 2019-07-23
Attending: PHYSICAL MEDICINE & REHABILITATION | Admitting: PHYSICAL MEDICINE & REHABILITATION
Payer: MEDICARE

## 2019-07-23 VITALS
TEMPERATURE: 97.4 F | DIASTOLIC BLOOD PRESSURE: 67 MMHG | HEIGHT: 63 IN | WEIGHT: 195 LBS | BODY MASS INDEX: 34.55 KG/M2 | OXYGEN SATURATION: 94 % | SYSTOLIC BLOOD PRESSURE: 147 MMHG | HEART RATE: 58 BPM | RESPIRATION RATE: 16 BRPM

## 2019-07-23 PROCEDURE — 6360000004 HC RX CONTRAST MEDICATION: Performed by: PHYSICAL MEDICINE & REHABILITATION

## 2019-07-23 PROCEDURE — 6360000002 HC RX W HCPCS: Performed by: PHYSICAL MEDICINE & REHABILITATION

## 2019-07-23 PROCEDURE — 2709999900 HC NON-CHARGEABLE SUPPLY: Performed by: PHYSICAL MEDICINE & REHABILITATION

## 2019-07-23 PROCEDURE — 64484 NJX AA&/STRD TFRM EPI L/S EA: CPT | Performed by: PHYSICAL MEDICINE & REHABILITATION

## 2019-07-23 PROCEDURE — 7100000010 HC PHASE II RECOVERY - FIRST 15 MIN: Performed by: PHYSICAL MEDICINE & REHABILITATION

## 2019-07-23 PROCEDURE — 2580000003 HC RX 258: Performed by: PHYSICAL MEDICINE & REHABILITATION

## 2019-07-23 PROCEDURE — 2500000003 HC RX 250 WO HCPCS: Performed by: PHYSICAL MEDICINE & REHABILITATION

## 2019-07-23 PROCEDURE — 3209999900 FLUORO FOR SURGICAL PROCEDURES

## 2019-07-23 PROCEDURE — 3600000057 HC PAIN LEVEL 4 ADDL 15 MIN: Performed by: PHYSICAL MEDICINE & REHABILITATION

## 2019-07-23 PROCEDURE — 3600000056 HC PAIN LEVEL 4 BASE: Performed by: PHYSICAL MEDICINE & REHABILITATION

## 2019-07-23 PROCEDURE — 64483 NJX AA&/STRD TFRM EPI L/S 1: CPT | Performed by: PHYSICAL MEDICINE & REHABILITATION

## 2019-07-23 RX ORDER — DEXAMETHASONE SODIUM PHOSPHATE 10 MG/ML
INJECTION INTRAMUSCULAR; INTRAVENOUS PRN
Status: DISCONTINUED | OUTPATIENT
Start: 2019-07-23 | End: 2019-07-23 | Stop reason: ALTCHOICE

## 2019-07-23 RX ORDER — BUPIVACAINE HYDROCHLORIDE 5 MG/ML
INJECTION, SOLUTION EPIDURAL; INTRACAUDAL PRN
Status: DISCONTINUED | OUTPATIENT
Start: 2019-07-23 | End: 2019-07-23 | Stop reason: ALTCHOICE

## 2019-07-23 RX ORDER — 0.9 % SODIUM CHLORIDE 0.9 %
VIAL (ML) INJECTION PRN
Status: DISCONTINUED | OUTPATIENT
Start: 2019-07-23 | End: 2019-07-23 | Stop reason: ALTCHOICE

## 2019-07-23 ASSESSMENT — PAIN SCALES - GENERAL
PAINLEVEL_OUTOF10: 4
PAINLEVEL_OUTOF10: 4

## 2019-07-23 ASSESSMENT — PAIN DESCRIPTION - LOCATION
LOCATION: BUTTOCKS
LOCATION: BUTTOCKS

## 2019-07-23 ASSESSMENT — PAIN DESCRIPTION - ORIENTATION
ORIENTATION: RIGHT
ORIENTATION: RIGHT

## 2019-07-23 ASSESSMENT — PAIN - FUNCTIONAL ASSESSMENT: PAIN_FUNCTIONAL_ASSESSMENT: 0-10

## 2019-07-23 NOTE — OP NOTE
sterilely prepped and draped in the usual fashion in the prone position. Time out was verified for correct patient, side, level and procedure. SEDATION:   No conscious sedation was performed during the procedure. The patient remained awake and conversed throughout the procedure. The patient underwent pulse oximetry and blood pressure monitoring independently by a trained observer, as well as by a physician. PROCEDURE:  Under image-intensifier control, a 22 gauge needle x 5 inch spinal needle was guided successfully into the epidural space employing a posterior lateral/oblique approach. Needle aspiration was negative for heme or CSF. Instillation of  .5 mL of Omnipaque 240 contrast medium opacified the spinal nerve and demonstrated contiguous flow into the RL 4  epidural space. No vascular spread was noted. Digital subtraction was not employed to evaluate for vascular spread. The patient was monitored for any untoward reaction to contrast medium before proceeding with procedure #2. The patient did not report pain reproduction in a concordant distribution. Following needle position verification, a test dose of .5 mL of sterile lidocaine 0.5% was administered and patient monitored for any adverse effects. Then, 1 mL of   was instilled into the epidural space and the patient's response was again monitored. Finally, Dexamethasone (Decadron 10 mg/mL) 1 ml of 0.5% bupivacaine was then instilled. The patient's response was again monitored. The spinal needle was removed. Instillation of  .5 mL of Omnipaque 240 contrast medium opacified the spinal nerve and demonstrated contiguous flow into the RL 5 epidural space. No vascular spread was noted. Digital subtraction was not employed to evaluate for vascular spread. The patient was monitored for any untoward reaction to contrast medium before proceeding with procedure #2.  The patient did not report pain reproduction in a concordant distribution. Following needle position verification, a test dose of .5 mL of sterile lidocaine 0.5% was administered and patient monitored for any adverse effects. Then, 1 mL of   was instilled into the epidural space and the patient's response was again monitored. Finally, Dexamethasone (Decadron 10 mg/mL) 1 ml of 0.5% bupivacaine was then instilled. The patient's response was again monitored. The spinal needle was removed. The patient tolerated the procedure well and without complications and was noted to be in stable condition prior to discharge from the procedure center with discharge instructions. EBL: no blood loss    SPECIMEN: none    IMPRESSIONS:  1. RL4,5 transforaminal epidurogram, epidural anesthesia and epidural steroid injection procedures accomplished without incident. RECOMMENDATIONS:  1. Complete and return Post-Procedure Pain and Activity Diary.   2. Contact the [address] for symptom exacerbation, fever or unusual symptoms. 3. Post-procedure care according to verbal and written discharge instructions    POST-PROCEDURE EPIDUROGRAPHY INTERPRETATION:    EXAMINATION: AP, lateral, and oblique views    FLUORO TIME: 33 seconds    DISCUSSION: Spot views of the spine reveal normal alignment and segmentation. Spinal needle is positioned at the RL4,5 neuroforamin. Contrast spreads and outlines the RL4,5 nerve/ neuroforamin and epidural space. The epidurogram reveals excellent contrast flow. Visualized spine reveals See radiology report. Soft tissues reveal no abnormalities. IMPRESSION: RL4,5 transforaminal epidurogram/epineurogram reveals satisfactory needle position and contrast spread.      Electronically signed by Shanna Mo MD on 7/23/2019 at 2:07 PM

## 2019-07-23 NOTE — H&P
Nails show no clubbing. Psychiatric: She has a normal mood and affect. Her speech is normal and behavior is normal. Judgment normal. She is not agitated, not aggressive, not withdrawn and not combative. She does not express impulsivity or inappropriate judgment. Vitals reviewed. Assessment:   1. Lumbar spondylosis    2. Lumbar radiculitis    3. Chronic right-sided low back pain with right-sided sciatica                     Plan:   Schedule repeat right L4, L5 transforaminal      Informed consent has not been obtained for procedure. Memory Vandana Licea  on blood thinners. Medications to hold have been reviewed with patient. Blood thinners must be held with permission from your cardiologist or primary care physician. A letter is  required to besent to PCP/Cardiologist regarding holding medications for procedure to decrease bleeding risk.                           Alisia Adams, NICOLASA - CNP

## 2019-08-07 ENCOUNTER — OFFICE VISIT (OUTPATIENT)
Dept: PAIN MANAGEMENT | Age: 74
End: 2019-08-07
Payer: MEDICARE

## 2019-08-07 VITALS
WEIGHT: 193 LBS | DIASTOLIC BLOOD PRESSURE: 62 MMHG | SYSTOLIC BLOOD PRESSURE: 120 MMHG | RESPIRATION RATE: 16 BRPM | BODY MASS INDEX: 34.2 KG/M2 | HEIGHT: 63 IN | HEART RATE: 68 BPM

## 2019-08-07 DIAGNOSIS — M54.41 CHRONIC RIGHT-SIDED LOW BACK PAIN WITH RIGHT-SIDED SCIATICA: ICD-10-CM

## 2019-08-07 DIAGNOSIS — G89.29 CHRONIC RIGHT-SIDED LOW BACK PAIN WITH RIGHT-SIDED SCIATICA: ICD-10-CM

## 2019-08-07 DIAGNOSIS — M54.16 LUMBAR RADICULITIS: ICD-10-CM

## 2019-08-07 DIAGNOSIS — M47.816 LUMBAR SPONDYLOSIS: Primary | ICD-10-CM

## 2019-08-07 PROCEDURE — 99214 OFFICE O/P EST MOD 30 MIN: CPT | Performed by: NURSE PRACTITIONER

## 2019-08-07 ASSESSMENT — ENCOUNTER SYMPTOMS
RESPIRATORY NEGATIVE: 1
BACK PAIN: 1

## 2019-08-19 ENCOUNTER — TELEPHONE (OUTPATIENT)
Dept: PAIN MANAGEMENT | Age: 74
End: 2019-08-19

## 2019-08-27 DIAGNOSIS — G47.9 SLEEP DISORDER: ICD-10-CM

## 2019-08-27 RX ORDER — ZALEPLON 10 MG/1
10 CAPSULE ORAL NIGHTLY PRN
Qty: 90 CAPSULE | Refills: 1 | Status: SHIPPED | OUTPATIENT
Start: 2019-08-27 | End: 2020-03-18 | Stop reason: SDUPTHER

## 2019-08-27 NOTE — TELEPHONE ENCOUNTER
Patient will  so she can mail to [facility]    Last Appt:  4/24/2019  Next Appt:   9/5/2019  Med verified in [address] [place] [address] Rd

## 2019-09-05 ENCOUNTER — OFFICE VISIT (OUTPATIENT)
Dept: INTERNAL MEDICINE | Age: 74
End: 2019-09-05
Payer: MEDICARE

## 2019-09-05 VITALS
HEIGHT: 64 IN | RESPIRATION RATE: 16 BRPM | DIASTOLIC BLOOD PRESSURE: 76 MMHG | BODY MASS INDEX: 33.7 KG/M2 | WEIGHT: 197.4 LBS | OXYGEN SATURATION: 95 % | SYSTOLIC BLOOD PRESSURE: 128 MMHG | HEART RATE: 72 BPM

## 2019-09-05 DIAGNOSIS — J43.1 PANLOBULAR EMPHYSEMA (HCC): ICD-10-CM

## 2019-09-05 DIAGNOSIS — M89.9 DISORDER OF BONE: ICD-10-CM

## 2019-09-05 DIAGNOSIS — E78.5 DYSLIPIDEMIA: ICD-10-CM

## 2019-09-05 DIAGNOSIS — R73.01 IFG (IMPAIRED FASTING GLUCOSE): ICD-10-CM

## 2019-09-05 DIAGNOSIS — Z85.3 HISTORY OF BREAST CANCER: ICD-10-CM

## 2019-09-05 DIAGNOSIS — M85.80 OSTEOPENIA, UNSPECIFIED LOCATION: Primary | ICD-10-CM

## 2019-09-05 DIAGNOSIS — G62.9 PERIPHERAL POLYNEUROPATHY: ICD-10-CM

## 2019-09-05 DIAGNOSIS — F32.9 CHRONIC MAJOR DEPRESSIVE DISORDER: Primary | ICD-10-CM

## 2019-09-05 DIAGNOSIS — D47.2 MGUS (MONOCLONAL GAMMOPATHY OF UNKNOWN SIGNIFICANCE): ICD-10-CM

## 2019-09-05 DIAGNOSIS — M19.042 PRIMARY OSTEOARTHRITIS OF BOTH HANDS: ICD-10-CM

## 2019-09-05 DIAGNOSIS — M19.041 PRIMARY OSTEOARTHRITIS OF BOTH HANDS: ICD-10-CM

## 2019-09-05 DIAGNOSIS — F51.01 PRIMARY INSOMNIA: ICD-10-CM

## 2019-09-05 DIAGNOSIS — M85.80 OSTEOPENIA, UNSPECIFIED LOCATION: ICD-10-CM

## 2019-09-05 DIAGNOSIS — F32.A DEPRESSION, UNSPECIFIED DEPRESSION TYPE: ICD-10-CM

## 2019-09-05 DIAGNOSIS — E66.9 OBESITY WITHOUT SERIOUS COMORBIDITY, UNSPECIFIED CLASSIFICATION, UNSPECIFIED OBESITY TYPE: ICD-10-CM

## 2019-09-05 DIAGNOSIS — M47.816 LUMBAR SPONDYLOSIS: ICD-10-CM

## 2019-09-05 DIAGNOSIS — E55.9 VITAMIN D DEFICIENCY: ICD-10-CM

## 2019-09-05 PROCEDURE — 99214 OFFICE O/P EST MOD 30 MIN: CPT | Performed by: NURSE PRACTITIONER

## 2019-09-05 RX ORDER — ESCITALOPRAM OXALATE 10 MG/1
10 TABLET ORAL DAILY
Qty: 90 TABLET | Refills: 1 | Status: SHIPPED | OUTPATIENT
Start: 2019-09-05 | End: 2020-03-18 | Stop reason: SDUPTHER

## 2019-09-05 RX ORDER — ACETAMINOPHEN AND CODEINE PHOSPHATE 300; 30 MG/1; MG/1
1 TABLET ORAL EVERY 4 HOURS PRN
Qty: 30 TABLET | Refills: 0 | Status: SHIPPED | OUTPATIENT
Start: 2019-09-05 | End: 2019-09-30 | Stop reason: SDUPTHER

## 2019-09-05 RX ORDER — ESCITALOPRAM OXALATE 10 MG/1
10 TABLET ORAL DAILY
COMMUNITY
End: 2019-09-05 | Stop reason: SDUPTHER

## 2019-09-06 ASSESSMENT — ENCOUNTER SYMPTOMS
SORE THROAT: 0
SHORTNESS OF BREATH: 0
NAUSEA: 0
WHEEZING: 0
SINUS PRESSURE: 0
CHEST TIGHTNESS: 0
DIARRHEA: 0
TROUBLE SWALLOWING: 0
EYE PAIN: 0
CONSTIPATION: 0
BLOOD IN STOOL: 0
FACIAL SWELLING: 0
COLOR CHANGE: 0
COUGH: 0
ABDOMINAL PAIN: 0
VOMITING: 0
RHINORRHEA: 0

## 2019-09-06 NOTE — PROGRESS NOTES
Not on file     Relationship status: Not on file    Intimate partner violence:     Fear of current or ex partner: Not on file     Emotionally abused: Not on file     Physically abused: Not on file     Forced sexual activity: Not on file   Other Topics Concern    Not on file   Social History Narrative    She works ar [employer] on [employer]. 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 She has 2 adult sons, 4 grandkids. Review of Systems   Constitutional: Negative for activity change, appetite change, chills, fatigue, fever and unexpected weight change. HENT: Negative for congestion, dental problem, ear discharge, ear pain, facial swelling, hearing loss, postnasal drip, rhinorrhea, sinus pressure, sore throat and trouble swallowing. Eyes: Negative for pain and visual disturbance. Respiratory: Negative for cough, chest tightness, shortness of breath and wheezing. Cardiovascular: Negative for chest pain, palpitations and leg swelling. Gastrointestinal: Negative for abdominal pain, blood in stool, constipation, diarrhea, nausea and vomiting. Endocrine: Negative for cold intolerance, heat intolerance and polyuria. Genitourinary: Negative for difficulty urinating. Musculoskeletal: Positive for arthralgias. Negative for gait problem, myalgias, neck pain and neck stiffness. Skin: Negative for color change, rash and wound. Neurological: Negative for dizziness, tremors, seizures, weakness, light-headedness, numbness and headaches. Psychiatric/Behavioral: Negative for confusion and hallucinations. The patient is not nervous/anxious. Physical Exam   Constitutional: She is oriented to person, place, and time. She appears well-developed and well-nourished. No distress. HENT:   Head: Normocephalic and atraumatic.    Right Ear: External ear normal.   Left Ear: External ear normal.   Eyes: Right eye exhibits no without serious comorbidity, unspecified classification, unspecified obesity type    - [address], NP, Diabetes Management, [place]    14. Disorder of bone   DEXA scan      Plan:  As noted above. Follow up for routine visit. Call sooner with concerns prior.     Electronically signed by Josphine Primrose, APRN - CNP on 9/5/2019 at 8:42 PM

## 2019-09-10 ENCOUNTER — HOSPITAL ENCOUNTER (OUTPATIENT)
Dept: BONE DENSITY | Age: 74
Discharge: HOME | End: 2019-09-12
Payer: MEDICARE

## 2019-09-10 ENCOUNTER — HOSPITAL ENCOUNTER (OUTPATIENT)
Dept: LAB | Age: 74
Discharge: HOME | End: 2019-09-10
Payer: MEDICARE

## 2019-09-10 DIAGNOSIS — R73.01 IFG (IMPAIRED FASTING GLUCOSE): ICD-10-CM

## 2019-09-10 DIAGNOSIS — M89.9 DISORDER OF BONE: ICD-10-CM

## 2019-09-10 DIAGNOSIS — E55.9 VITAMIN D DEFICIENCY: ICD-10-CM

## 2019-09-10 DIAGNOSIS — E78.5 DYSLIPIDEMIA: ICD-10-CM

## 2019-09-10 DIAGNOSIS — D47.2 MGUS (MONOCLONAL GAMMOPATHY OF UNKNOWN SIGNIFICANCE): ICD-10-CM

## 2019-09-10 DIAGNOSIS — M85.80 OSTEOPENIA, UNSPECIFIED LOCATION: ICD-10-CM

## 2019-09-10 LAB
ALBUMIN SERPL-MCNC: 4.6 G/DL (ref 3.5–5.2)
ALBUMIN/GLOBULIN RATIO: 1.4 (ref 1–2.5)
ALP BLD-CCNC: 46 U/L (ref 35–104)
ALT SERPL-CCNC: 18 U/L (ref 5–33)
ANION GAP SERPL CALCULATED.3IONS-SCNC: 10 MMOL/L (ref 9–17)
AST SERPL-CCNC: 20 U/L
BILIRUB SERPL-MCNC: 0.3 MG/DL (ref 0.3–1.2)
BUN BLDV-MCNC: 29 MG/DL (ref 8–23)
BUN/CREAT BLD: 38 (ref 9–20)
CALCIUM SERPL-MCNC: 10 MG/DL (ref 8.6–10.4)
CHLORIDE BLD-SCNC: 101 MMOL/L (ref 98–107)
CHOLESTEROL/HDL RATIO: 4.9
CHOLESTEROL: 285 MG/DL
CO2: 26 MMOL/L (ref 20–31)
CREAT SERPL-MCNC: 0.76 MG/DL (ref 0.5–0.9)
ESTIMATED AVERAGE GLUCOSE: 123 MG/DL
FREE KAPPA/LAMBDA RATIO: 0.29 (ref 0.26–1.65)
GFR AFRICAN AMERICAN: >60 ML/MIN
GFR NON-AFRICAN AMERICAN: >60 ML/MIN
GFR SERPL CREATININE-BSD FRML MDRD: ABNORMAL ML/MIN/{1.73_M2}
GFR SERPL CREATININE-BSD FRML MDRD: ABNORMAL ML/MIN/{1.73_M2}
GLUCOSE BLD-MCNC: 99 MG/DL (ref 70–99)
HBA1C MFR BLD: 5.9 % (ref 4.8–5.9)
HCT VFR BLD CALC: 36.1 % (ref 36–46)
HDLC SERPL-MCNC: 58 MG/DL
HEMOGLOBIN: 11.9 G/DL (ref 12–16)
KAPPA FREE LIGHT CHAINS QNT: 1.23 MG/DL (ref 0.37–1.94)
LACTATE DEHYDROGENASE: 272 U/L (ref 135–214)
LAMBDA FREE LIGHT CHAINS QNT: 4.29 MG/DL (ref 0.57–2.63)
LDL CHOLESTEROL: 180 MG/DL (ref 0–130)
MCH RBC QN AUTO: 29.8 PG (ref 26–34)
MCHC RBC AUTO-ENTMCNC: 33 G/DL (ref 31–37)
MCV RBC AUTO: 90.5 FL (ref 80–100)
NRBC AUTOMATED: ABNORMAL PER 100 WBC
PDW BLD-RTO: 14.9 % (ref 11–14.5)
PLATELET # BLD: 269 K/UL (ref 140–450)
PMV BLD AUTO: 9.2 FL (ref 6–12)
POTASSIUM SERPL-SCNC: 5 MMOL/L (ref 3.7–5.3)
RBC # BLD: 3.99 M/UL (ref 4–5.2)
SODIUM BLD-SCNC: 137 MMOL/L (ref 135–144)
TOTAL PROTEIN: 8 G/DL (ref 6.4–8.3)
TRIGL SERPL-MCNC: 233 MG/DL
VITAMIN D 25-HYDROXY: 35.8 NG/ML (ref 30–100)
VLDLC SERPL CALC-MCNC: ABNORMAL MG/DL (ref 1–30)
WBC # BLD: 5 K/UL (ref 3.5–11)

## 2019-09-10 PROCEDURE — 80061 LIPID PANEL: CPT

## 2019-09-10 PROCEDURE — 84155 ASSAY OF PROTEIN SERUM: CPT

## 2019-09-10 PROCEDURE — 80053 COMPREHEN METABOLIC PANEL: CPT

## 2019-09-10 PROCEDURE — 82306 VITAMIN D 25 HYDROXY: CPT

## 2019-09-10 PROCEDURE — 83036 HEMOGLOBIN GLYCOSYLATED A1C: CPT

## 2019-09-10 PROCEDURE — 83615 LACTATE (LD) (LDH) ENZYME: CPT

## 2019-09-10 PROCEDURE — 77085 DXA BONE DENSITY AXL VRT FX: CPT

## 2019-09-10 PROCEDURE — 85027 COMPLETE CBC AUTOMATED: CPT

## 2019-09-10 PROCEDURE — 36415 COLL VENOUS BLD VENIPUNCTURE: CPT

## 2019-09-10 PROCEDURE — 83883 ASSAY NEPHELOMETRY NOT SPEC: CPT

## 2019-09-10 PROCEDURE — 84165 PROTEIN E-PHORESIS SERUM: CPT

## 2019-09-11 LAB
ALBUMIN (CALCULATED): 4.5 G/DL (ref 3.2–5.2)
ALBUMIN PERCENT: 61 % (ref 45–65)
ALPHA 1 PERCENT: 2 % (ref 3–6)
ALPHA 2 PERCENT: 10 % (ref 6–13)
ALPHA-1-GLOBULIN: 0.1 G/DL (ref 0.1–0.4)
ALPHA-2-GLOBULIN: 0.7 G/DL (ref 0.5–0.9)
BETA GLOBULIN: 0.8 G/DL (ref 0.5–1.1)
BETA PERCENT: 10 % (ref 11–19)
GAMMA GLOBULIN %: 17 % (ref 9–20)
GAMMA GLOBULIN: 1.3 G/DL (ref 0.5–1.5)
PATHOLOGIST: ABNORMAL
PROTEIN ELECTROPHORESIS, SERUM: ABNORMAL
TOTAL PROT. SUM,%: 100 % (ref 98–102)
TOTAL PROT. SUM: 7.4 G/DL (ref 6.3–8.2)
TOTAL PROTEIN: 7.4 G/DL (ref 6.4–8.3)

## 2019-09-13 ENCOUNTER — TELEPHONE (OUTPATIENT)
Dept: INTERNAL MEDICINE | Age: 74
End: 2019-09-13

## 2019-09-30 DIAGNOSIS — M19.042 PRIMARY OSTEOARTHRITIS OF BOTH HANDS: ICD-10-CM

## 2019-09-30 DIAGNOSIS — M47.816 LUMBAR SPONDYLOSIS: ICD-10-CM

## 2019-09-30 DIAGNOSIS — M19.041 PRIMARY OSTEOARTHRITIS OF BOTH HANDS: ICD-10-CM

## 2019-10-01 RX ORDER — ACETAMINOPHEN AND CODEINE PHOSPHATE 300; 30 MG/1; MG/1
1 TABLET ORAL EVERY 4 HOURS PRN
Qty: 30 TABLET | Refills: 0 | Status: SHIPPED | OUTPATIENT
Start: 2019-10-01 | End: 2019-10-11

## 2019-11-18 ENCOUNTER — TELEPHONE (OUTPATIENT)
Dept: MAMMOGRAPHY | Age: 74
End: 2019-11-18

## 2019-11-18 DIAGNOSIS — Z12.39 SCREENING BREAST EXAMINATION: Primary | ICD-10-CM

## 2019-11-26 ENCOUNTER — HOSPITAL ENCOUNTER (OUTPATIENT)
Dept: MAMMOGRAPHY | Age: 74
Discharge: HOME | End: 2019-11-28
Payer: MEDICARE

## 2019-11-26 ENCOUNTER — TELEPHONE (OUTPATIENT)
Dept: INTERNAL MEDICINE | Age: 74
End: 2019-11-26

## 2019-11-26 DIAGNOSIS — Z12.39 SCREENING BREAST EXAMINATION: ICD-10-CM

## 2019-11-26 PROCEDURE — 77063 BREAST TOMOSYNTHESIS BI: CPT

## 2019-12-17 ENCOUNTER — OFFICE VISIT (OUTPATIENT)
Dept: OPTOMETRY | Age: 74
End: 2019-12-17
Payer: COMMERCIAL

## 2019-12-17 DIAGNOSIS — H52.03 HYPEROPIA OF BOTH EYES WITH ASTIGMATISM AND PRESBYOPIA: Primary | ICD-10-CM

## 2019-12-17 DIAGNOSIS — H52.4 HYPEROPIA OF BOTH EYES WITH ASTIGMATISM AND PRESBYOPIA: Primary | ICD-10-CM

## 2019-12-17 DIAGNOSIS — H52.203 HYPEROPIA OF BOTH EYES WITH ASTIGMATISM AND PRESBYOPIA: Primary | ICD-10-CM

## 2019-12-17 PROCEDURE — 92014 COMPRE OPH EXAM EST PT 1/>: CPT | Performed by: OPTOMETRIST

## 2019-12-17 ASSESSMENT — VISUAL ACUITY
OD_CC+: -1
OD_CC: 20/25 OU
OS_CC: 20/25
CORRECTION_TYPE: GLASSES
METHOD: SNELLEN - LINEAR

## 2019-12-17 ASSESSMENT — REFRACTION_WEARINGRX
OD_CYLINDER: -1.25
OS_SPHERE: +1.75
OD_AXIS: 098
OD_SPHERE: +2.00
OS_ADD: +2.50
SPECS_TYPE: PAL
OD_ADD: +2.50
OS_CYLINDER: -1.25
OS_AXIS: 080

## 2019-12-17 ASSESSMENT — REFRACTION_MANIFEST
OS_SPHERE: +2.00
OS_ADD: +2.50
OS_CYLINDER: -1.25
OD_AXIS: 098
OS_AXIS: 078
OD_SPHERE: +2.50
OD_CYLINDER: -1.50
OD_ADD: +2.50

## 2019-12-17 ASSESSMENT — TONOMETRY
IOP_METHOD: NON-CONTACT AIR PUFF
OS_IOP_MMHG: 16
OD_IOP_MMHG: 18

## 2019-12-17 ASSESSMENT — SLIT LAMP EXAM - LIDS
COMMENTS: NORMAL
COMMENTS: NORMAL

## 2019-12-31 ENCOUNTER — PROCEDURE VISIT (OUTPATIENT)
Dept: SURGERY | Age: 74
End: 2019-12-31

## 2019-12-31 ENCOUNTER — HOSPITAL ENCOUNTER (OUTPATIENT)
Age: 74
Setting detail: SPECIMEN
Discharge: HOME | End: 2019-12-31
Payer: MEDICARE

## 2019-12-31 VITALS
TEMPERATURE: 97.3 F | HEIGHT: 64 IN | SYSTOLIC BLOOD PRESSURE: 120 MMHG | BODY MASS INDEX: 33.94 KG/M2 | HEART RATE: 72 BPM | WEIGHT: 198.8 LBS | DIASTOLIC BLOOD PRESSURE: 62 MMHG

## 2019-12-31 DIAGNOSIS — L98.9 SKIN LESION OF RIGHT UPPER EXTREMITY: Primary | ICD-10-CM

## 2019-12-31 PROCEDURE — 88304 TISSUE EXAM BY PATHOLOGIST: CPT

## 2020-01-02 ENCOUNTER — TELEPHONE (OUTPATIENT)
Dept: SURGERY | Age: 75
End: 2020-01-02

## 2020-01-02 NOTE — TELEPHONE ENCOUNTER
T/C from pt stating that she is scheduled for a nurse visit to have sutures removed on 1/13/20 and would like to change that appt to 01/10/20 any time between 10:30am and 5pm. Please call pt and let her know what time and if that day will work.

## 2020-01-03 LAB — DERMATOLOGY PATHOLOGY REPORT: NORMAL

## 2020-01-10 ENCOUNTER — NURSE ONLY (OUTPATIENT)
Dept: SURGERY | Age: 75
End: 2020-01-10
Payer: MEDICARE

## 2020-01-10 VITALS
SYSTOLIC BLOOD PRESSURE: 136 MMHG | HEIGHT: 64 IN | DIASTOLIC BLOOD PRESSURE: 66 MMHG | WEIGHT: 198.85 LBS | HEART RATE: 70 BPM | BODY MASS INDEX: 33.95 KG/M2

## 2020-01-10 PROCEDURE — 99211 OFF/OP EST MAY X REQ PHY/QHP: CPT | Performed by: SURGERY

## 2020-01-10 PROCEDURE — 99024 POSTOP FOLLOW-UP VISIT: CPT | Performed by: SURGERY

## 2020-02-10 ENCOUNTER — OFFICE VISIT (OUTPATIENT)
Dept: OPTOMETRY | Age: 75
End: 2020-02-10
Payer: MEDICARE

## 2020-02-10 PROCEDURE — 99999 PR OFFICE/OUTPT VISIT,PROCEDURE ONLY: CPT | Performed by: OPTOMETRIST

## 2020-02-10 PROCEDURE — 99211 OFF/OP EST MAY X REQ PHY/QHP: CPT

## 2020-02-10 ASSESSMENT — REFRACTION_WEARINGRX
OD_AXIS: 098
OS_ADD: +2.50
OD_SPHERE: +2.50
SPECS_TYPE: PAL
OS_AXIS: 078
OD_CYLINDER: -1.50
OD_ADD: +2.50
OS_SPHERE: +2.00
OS_CYLINDER: -1.25

## 2020-02-10 ASSESSMENT — REFRACTION_MANIFEST
OD_CYLINDER: -1.50
OD_ADD: +2.50
OS_ADD: +2.50
OS_SPHERE: +1.75
OD_AXIS: 098
OD_SPHERE: +2.50
OS_CYLINDER: -1.25
OS_AXIS: 078

## 2020-02-10 ASSESSMENT — VISUAL ACUITY
OD_CC: 20/40 OU
OS_CC: 20/25
METHOD: SNELLEN - LINEAR

## 2020-02-18 ENCOUNTER — TELEPHONE (OUTPATIENT)
Dept: INTERNAL MEDICINE | Age: 75
End: 2020-02-18

## 2020-03-18 ENCOUNTER — OFFICE VISIT (OUTPATIENT)
Dept: INTERNAL MEDICINE | Age: 75
End: 2020-03-18
Payer: MEDICARE

## 2020-03-18 VITALS
BODY MASS INDEX: 34.01 KG/M2 | RESPIRATION RATE: 18 BRPM | HEART RATE: 70 BPM | DIASTOLIC BLOOD PRESSURE: 78 MMHG | SYSTOLIC BLOOD PRESSURE: 122 MMHG | HEIGHT: 64 IN | WEIGHT: 199.2 LBS

## 2020-03-18 PROCEDURE — G0439 PPPS, SUBSEQ VISIT: HCPCS | Performed by: NURSE PRACTITIONER

## 2020-03-18 RX ORDER — ESCITALOPRAM OXALATE 10 MG/1
10 TABLET ORAL DAILY
Qty: 90 TABLET | Refills: 1 | Status: SHIPPED | OUTPATIENT
Start: 2020-03-18 | End: 2020-09-23 | Stop reason: SDUPTHER

## 2020-03-18 RX ORDER — ZALEPLON 10 MG/1
10 CAPSULE ORAL NIGHTLY PRN
Qty: 90 CAPSULE | Refills: 1 | Status: SHIPPED | OUTPATIENT
Start: 2020-03-18 | End: 2020-09-23 | Stop reason: SDUPTHER

## 2020-03-18 ASSESSMENT — LIFESTYLE VARIABLES
HOW OFTEN DO YOU HAVE SIX OR MORE DRINKS ON ONE OCCASION: 0
HOW OFTEN DO YOU HAVE A DRINK CONTAINING ALCOHOL: 2
HOW MANY STANDARD DRINKS CONTAINING ALCOHOL DO YOU HAVE ON A TYPICAL DAY: 0
AUDIT-C TOTAL SCORE: 2

## 2020-03-18 NOTE — PROGRESS NOTES
03/18/20  Dewarj Sleight  1945      Chief Complaint:   1. Routine general medical examination at a health care facility    2. Sleep disorder    3. Chronic major depressive disorder    4. Depression, unspecified depression type    5. History of breast cancer    6. Primary insomnia    7. Dyslipidemia    8. Primary osteoarthritis of both hands    9. Osteopenia, unspecified location    10. MGUS (monoclonal gammopathy of unknown significance)    11. Peripheral polyneuropathy    12. Vitamin D deficiency    13. IFG (impaired fasting glucose)    14. Panlobular emphysema ([address]    15. Lumbar spondylosis    16. Obesity without serious comorbidity, unspecified classification, unspecified obesity type        HPI:    65-year-old patient in for annual wellness visit and six-month follow-up of above chronic health conditions. Mood has been stable on her Lexapro. Did trigger a positive on the depression screen of loneliness. Patient states this is just due to the winter and she is not as active as she is all summer. Denies any thoughts of harming herself or harming others. Does remain active within her [employer]. Talks are her friends daily. Did undergo a lumbar fusion in October 2019. States back pain significantly improved. She does continue on the diclofenac for the arthritis in her hands during the winter months tablet form and states in the summer months it is not as bad and she can use the arthritic cream instead. We reviewed previous labs for her MGUS. She denies any excessive fatigue no weight loss. Continues with her yearly mammograms for history of breast cancer. Denies any shortness of breath wheezing. We discussed that she was due for the colonoscopy in February however with the covid pandemic she is wanting to postpone this for a few months.       Allergies   Allergen Reactions    Crestor [Rosuvastatin]      MUSCLE PAIN/ACHES      Cymbalta [Duloxetine Hcl]      Disorientation, excessive sleepiness    Influenza Vaccines Other (See Comments)     Causes problems with eating eggs    Lipitor      MUSCLE PAIN/ACHES      Meloxicam Other (See Comments)     constipation    Pravastatin      MUSCLE PAIN/ACHES    Prednisone      Lost eyelashes       Red Yeast Rice [Monascus Purpureus Went Yeast]      POORLY TOLERATED    Zetia [Ezetimibe]      CONSTIPATION      Zocor [Simvastatin]      MUSCLE PAIN/ACHES    Floxin [Ofloxacin] Nausea And Vomiting    Gabapentin Rash       Past Medical History:   Diagnosis Date    Breast CA ([facility]     2/15 s/p XRT & Dr Humble Deleon following    COPD (chronic obstructive pulmonary disease) ([facility] [address] 03/04    mild obstructive defect on PFT's    Depression     Dyslipidemia     Hyperplastic colon polyp     Colonoscopy 2/16/15 with repeat recommended 2/16/2020    IFG (impaired fasting glucose) 5/9/2017    Insomnia     MGUS (monoclonal gammopathy of unknown significance)     0.64 grams/decilier IgG lambda, 04/12. 1.) UPUP negative 04/12. Stable July 2014    Osteoarthritis     particularly in the hands    Osteopenia     DEXA, 01/11/04, T -1.7 spine, T -0.2  hip 1.) T -1.6 spine, T -0.2 hip, 05/07 2.) Repeat DEXA scan, 10/09, with -1.3 spine, T -0.1 hip. 3.) DEXA, 04/12, T -1.4 spine, T -0.0 hip.  4.) Treated with Fosamax x 8 yrs, discontinued 01/12 repeat DEXA 10/14 FRAX 2.4% 10 year risk at hip    Peripheral neuropathy     with mild to moderate sensorimotor peripheral polyneuropathy noted on EMG, 11/11    Vitamin D deficiency        Past Surgical History:   Procedure Laterality Date    APPENDECTOMY      BACK SURGERY  10/11/2019    BREAST BIOPSY Left 11-12-14    US guided brest bx - benign    BREAST LUMPECTOMY Left 02/16/2015    with needle placement- MUCINOUS CARCINOMA- shw    COLONOSCOPY  02/28/03    normal-reed    COLONOSCOPY  10/30/98    normal-reed    COLONOSCOPY  2008    normal, family hx colon cancer- al-jadda    COLONOSCOPY  2/2015    6 cm polyp-benign, repeat 5yrs- sonny  EYE SURGERY      FOOT SURGERY Right 1990's, 2002    [place] [place]     HYSTERECTOMY      WITH BILATERAL SALPINGO OOPHORECTOMY    KNEE ARTHROSCOPY  2011    RIGHT KNEE    LASIK Bilateral     LUMBAR SPINE SURGERY Right 6/21/2019    Right L4 L5 TRANSFORAMINAL performed by Tiffanie George MD at [facility] Right 7/23/2019    Right L4 & L5 TRANSFORAMINAL performed by Tiffanie George MD at [facility] Left [id number]    left sentinal node biopsy    TUBAL LIGATION  08/74       Current Outpatient Medications on File Prior to Visit   Medication Sig Dispense Refill    Glucos-Chondroit-Hyaluron-MSM (GLUCOSAMINE CHONDROITIN JOINT PO) Take by mouth 2 times daily      Handicap Placard MISC by Does not apply route The disability is expected to last 9/5/2024 1 each 0    acetaminophen (TYLENOL) 500 MG tablet Take 1,500 mg by mouth 2 times daily Indications: pt states she is taking abouot 3 to 4 500mg capsule BID      vitamin D (CHOLECALCIFEROL) 1000 UNIT TABS tablet Take 1,000 Units by mouth daily      magnesium (MAGNESIUM-OXIDE) 250 MG TABS tablet Take 250 mg by mouth daily      aspirin 81 MG tablet Take 81 mg by mouth daily      B Complex Vitamins (VITAMIN B COMPLEX PO) Take by mouth daily      BLACK COHOSH HOT FLASH RELIEF PO Take 1-2 tablets by mouth daily as needed      Calcium Carbonate-Vitamin D (CALCIUM 600 + D PO)   Take 1 tablet by mouth daily        No current facility-administered medications on file prior to visit. Social History     Socioeconomic History    Marital status:       Spouse name: Not on file    Number of children: 2    Years of education: Not on file    Highest education level: Not on file   Occupational History    Not on file   Social Needs    Financial resource strain: Not on file    Food insecurity     Worry: Not on file     Inability: Not on file    Transportation needs     Medical: Not on file     Non-medical: Not on file   Tobacco Use  Smoking status: Former Smoker     Packs/day: 0.50     Years: 10.00     Pack years: 5.00     Last attempt to quit: 2014     Years since quittin.8    Smokeless tobacco: Never Used    Tobacco comment: quit 10/11 - smokes some days   Substance and Sexual Activity    Alcohol use: Yes     Alcohol/week: 0.0 standard drinks     Comment: 1 drink a week    Drug use: No    Sexual activity: Not on file   Lifestyle    Physical activity     Days per week: Not on file     Minutes per session: Not on file    Stress: Not on file   Relationships    Social connections     Talks on phone: Not on file     Gets together: Not on file     Attends [employer] service: Not on file     Active member of club or organization: Not on file     Attends meetings of clubs or organizations: Not on file     Relationship status: Not on file    Intimate partner violence     Fear of current or ex partner: Not on file     Emotionally abused: Not on file     Physically abused: Not on file     Forced sexual activity: Not on file   Other Topics Concern    Not on file   Social History Narrative    She works [employer] on [address]. 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 She has 2 adult sons, 4 grandkids. Review of Systems   Constitutional: Negative for activity change, appetite change, chills, fatigue, fever and unexpected weight change. HENT: Negative for congestion, dental problem, ear discharge, ear pain, facial swelling, hearing loss, postnasal drip, rhinorrhea, sinus pressure, sore throat and trouble swallowing. Eyes: Negative for pain and visual disturbance. Respiratory: Negative for cough, chest tightness, shortness of breath and wheezing. Cardiovascular: Negative for chest pain, palpitations and leg swelling. Gastrointestinal: Negative for abdominal pain, blood in stool, constipation, diarrhea, nausea and vomiting.    Endocrine: vitamin D follow-up DEXA scan stable previously on Fosamax which was discontinued in January 2012    10. MGUS (monoclonal gammopathy of unknown significance)  Labs last fall stable declines further follow-up with hematology at present. Patient understands risk of developing a multiple myeloma down the road. Serial lab follow-ups ordered  - CBC; Future  - Lactate Dehydrogenase; Future  - Kappa/Lambda Quant Free Light Chains Serum; Future  - Protein Electrophoresis, Serum; Future    11. Peripheral polyneuropathy  Stable at present    12. Vitamin D deficiency  Stable on supplemental vitamin D, serial lab follow-up  - Vitamin D 25 Hydroxy; Future    13. IFG (impaired fasting glucose)  Stable, continue to work on diet, remain active, serial lab follow-up  - Hemoglobin A1C; Future  - Comprehensive Metabolic Panel; Future    14. Panlobular emphysema (HCC)  Stable. No routine inhalers  - CBC; Future    15. Lumbar spondylosis  Status post lumbar fusion. Ongoing follow-up with orthopedics in place    16. Obesity without serious comorbidity, unspecified classification, unspecified obesity type  Continue to work on diet, remain active      Plan:  As noted above. Follow up for routine visit. Call sooner with concerns prior.     Electronically signed by NICOLASA Jo CNP on 3/18/2020 at 3:06 PM

## 2020-03-18 NOTE — PROGRESS NOTES
Medicare Annual Wellness Visit  Name: Nancy Mauro Date: 3/18/2020   MRN: [id number] Sex: Female   Age: 75 y.o. Ethnicity: Non-/Non    : 1945 Race: Nishi Kovacs is here for Medicare AWV and 6 Month Follow-Up    Screenings for behavioral, psychosocial and functional/safety risks, and cognitive dysfunction are all negative except as indicated below. These results, as well as other patient data from the [address] form, are documented in Flowsheets linked to this Encounter. Allergies   Allergen Reactions    Crestor [Rosuvastatin]      MUSCLE PAIN/ACHES      Cymbalta [Duloxetine Hcl]      Disorientation, excessive sleepiness    Influenza Vaccines Other (See Comments)     Causes problems with eating eggs    Lipitor      MUSCLE PAIN/ACHES      Meloxicam Other (See Comments)     constipation    Pravastatin      MUSCLE PAIN/ACHES    Prednisone      Lost eyelashes       Red Yeast Rice [Monascus Purpureus Went Yeast]      POORLY TOLERATED    Zetia [Ezetimibe]      CONSTIPATION      Zocor [Simvastatin]      MUSCLE PAIN/ACHES    Floxin [Ofloxacin] Nausea And Vomiting    Gabapentin Rash       Prior to Visit Medications    Medication Sig Taking? Authorizing Provider   diclofenac (VOLTAREN) 50 MG EC tablet Take 1 tablet by mouth daily Yes NICOLASA Hatfield CNP   Glucos-Chondroit-Hyaluron-MSM (GLUCOSAMINE CHONDROITIN JOINT PO) Take by mouth 2 times daily Yes Historical Provider, MD   escitalopram (LEXAPRO) 10 MG tablet Take 1 tablet by mouth daily Yes NICOLASA Hatfield CNP   Handicap Placard MISC by Does not apply route The disability is expected to last 2024 Yes NICOLASA Hatfield CNP   zaleplon (SONATA) 10 MG capsule Take 1 capsule by mouth nightly as needed (sleep).  Yes NICOLASA Hatfield CNP   acetaminophen (TYLENOL) 500 MG tablet Take 1,500 mg by mouth 2 times daily Indications: pt states she is taking abouot 3 to 4 500mg capsule BID Yes Historical Provider, MD   vitamin D (CHOLECALCIFEROL) 1000 UNIT TABS tablet Take 1,000 Units by mouth daily Yes Historical Provider, MD   magnesium (MAGNESIUM-OXIDE) 250 MG TABS tablet Take 250 mg by mouth daily Yes Historical Provider, MD   aspirin 81 MG tablet Take 81 mg by mouth daily Yes Historical Provider, MD   B Complex Vitamins (VITAMIN B COMPLEX PO) Take by mouth daily Yes Historical Provider, MD   BLACK COHOSH HOT FLASH RELIEF PO Take 1-2 tablets by mouth daily as needed Yes Historical Provider, MD   Calcium Carbonate-Vitamin D (CALCIUM 600 + D PO)   Take 1 tablet by mouth daily  Yes Historical Provider, MD       Past Medical History:   Diagnosis Date    Breast CA ([facility] [address]     2/15 s/p XRT & Dr Scottie Raman following    COPD (chronic obstructive pulmonary disease) ([facility] [address] 03/04    mild obstructive defect on PFT's    Depression     Dyslipidemia     Hyperplastic colon polyp     Colonoscopy 2/16/15 with repeat recommended 2/16/2020    IFG (impaired fasting glucose) 5/9/2017    Insomnia     MGUS (monoclonal gammopathy of unknown significance)     0.64 grams/decilier IgG lambda, 04/12. 1.) UPUP negative 04/12. Stable July 2014    Osteoarthritis     particularly in the hands    Osteopenia     DEXA, 01/11/04, T -1.7 spine, T -0.2  hip 1.) T -1.6 spine, T -0.2 hip, 05/07 2.) Repeat DEXA scan, 10/09, with -1.3 spine, T -0.1 hip. 3.) DEXA, 04/12, T -1.4 spine, T -0.0 hip.  4.) Treated with Fosamax x 8 yrs, discontinued 01/12 repeat DEXA 10/14 FRAX 2.4% 10 year risk at hip    Peripheral neuropathy     with mild to moderate sensorimotor peripheral polyneuropathy noted on EMG, 11/11    Vitamin D deficiency        Past Surgical History:   Procedure Laterality Date    APPENDECTOMY      BACK SURGERY  10/11/2019    BREAST BIOPSY Left 11-12-14    US guided brest bx - benign    BREAST LUMPECTOMY Left 02/16/2015    with needle placement- MUCINOUS CARCINOMA- shw    COLONOSCOPY  02/28/03    normal-reed  COLONOSCOPY  10/30/98    normal-reed    COLONOSCOPY  2008    normal, family hx colon cancer- al-jabernabe    COLONOSCOPY  2/2015    6 cm polyp-benign, repeat 5yrs- dennis    EYE SURGERY      FOOT SURGERY Right 1990's, 2002    [facility] [place]     HYSTERECTOMY      WITH BILATERAL SALPINGO OOPHORECTOMY    KNEE ARTHROSCOPY  2011    RIGHT KNEE    LASIK Bilateral     LUMBAR SPINE SURGERY Right 6/21/2019    Right L4 L5 TRANSFORAMINAL performed by Saba Mathews MD at [id number] [place] [address]  7/23/2019    Right L4 & L5 TRANSFORAMINAL performed by Saba Mathews MD at [address] [place] [address] Left [id number]    left sentinal node biopsy    TUBAL LIGATION  08/74       Family History   Problem Relation Age of Onset    Cancer Mother         COLON CANCER    Heart Attack Father         LATE 70'S    Diabetes Neg Hx     Breast Cancer Neg Hx     Cataracts Neg Hx     Glaucoma Neg Hx        CareTeam (Including outside providers/suppliers regularly involved in providing care):   Patient Care Team:  NICOLASA Middleton CNP as PCP - General (Nurse Practitioner)  NICOLASA Middleton CNP as PCP - [facility] Empaneled Provider    Wt Readings from Last 3 Encounters:   03/18/20 199 lb 3.2 oz (90.4 kg)   01/10/20 198 lb 13.7 oz (90.2 kg)   12/31/19 198 lb 12.8 oz (90.2 kg)     Vitals:    03/18/20 1359   BP: 122/78   Position: Sitting   Cuff Size: Large Adult   Pulse: 70   Weight: 199 lb 3.2 oz (90.4 kg)   Height: 5' 3.5\" (1.613 m)     Body mass index is 34.73 kg/m². Patient's complete Health Risk Assessment and screening values have been reviewed and are found in Flowsheets. The following problems were reviewed today and where indicated follow up appointments were made and/or referrals ordered. Positive Risk Factor Screenings with Interventions:     General Health:  General  In general, how would you say your health is?: Good  In the past 7 days, have you experienced any of the following?  New or screening schedule for the next 5-10 years is provided to the patient in written form: see Patient Instructions/AVS.    Anson Shoemaker LPN, [birth year]97[birth year]2744, performed the documented evaluation under the direct supervision of the attending physician.

## 2020-03-19 ASSESSMENT — ENCOUNTER SYMPTOMS
SHORTNESS OF BREATH: 0
CHEST TIGHTNESS: 0
FACIAL SWELLING: 0
SINUS PRESSURE: 0
BLOOD IN STOOL: 0
EYE PAIN: 0
VOMITING: 0
NAUSEA: 0
COUGH: 0
WHEEZING: 0
RHINORRHEA: 0
DIARRHEA: 0
SORE THROAT: 0
TROUBLE SWALLOWING: 0
CONSTIPATION: 0
ABDOMINAL PAIN: 0
COLOR CHANGE: 0

## 2020-05-27 ENCOUNTER — TELEPHONE (OUTPATIENT)
Dept: INTERNAL MEDICINE | Age: 75
End: 2020-05-27

## 2020-05-27 NOTE — TELEPHONE ENCOUNTER
Patient needs a letter for post office stating she needs mailbox moved closer to the house due to falls.   Please mail letter to patient

## 2020-09-21 ENCOUNTER — HOSPITAL ENCOUNTER (OUTPATIENT)
Dept: LAB | Age: 75
Discharge: HOME | End: 2020-09-21
Payer: MEDICARE

## 2020-09-21 LAB
ALBUMIN SERPL-MCNC: 4.4 G/DL (ref 3.5–5.2)
ALBUMIN/GLOBULIN RATIO: 1.3 (ref 1–2.5)
ALP BLD-CCNC: 52 U/L (ref 35–104)
ALT SERPL-CCNC: 18 U/L (ref 5–33)
ANION GAP SERPL CALCULATED.3IONS-SCNC: 8 MMOL/L (ref 9–17)
AST SERPL-CCNC: 22 U/L
BILIRUB SERPL-MCNC: 0.29 MG/DL (ref 0.3–1.2)
BUN BLDV-MCNC: 18 MG/DL (ref 8–23)
BUN/CREAT BLD: 21 (ref 9–20)
CALCIUM SERPL-MCNC: 9.5 MG/DL (ref 8.6–10.4)
CHLORIDE BLD-SCNC: 103 MMOL/L (ref 98–107)
CHOLESTEROL/HDL RATIO: 4.5
CHOLESTEROL: 266 MG/DL
CO2: 25 MMOL/L (ref 20–31)
CREAT SERPL-MCNC: 0.85 MG/DL (ref 0.5–0.9)
ESTIMATED AVERAGE GLUCOSE: 117 MG/DL
FREE KAPPA/LAMBDA RATIO: 0.27 (ref 0.26–1.65)
GFR AFRICAN AMERICAN: >60 ML/MIN
GFR NON-AFRICAN AMERICAN: >60 ML/MIN
GFR SERPL CREATININE-BSD FRML MDRD: ABNORMAL ML/MIN/{1.73_M2}
GFR SERPL CREATININE-BSD FRML MDRD: ABNORMAL ML/MIN/{1.73_M2}
GLUCOSE BLD-MCNC: 91 MG/DL (ref 70–99)
HBA1C MFR BLD: 5.7 % (ref 4.8–5.9)
HCT VFR BLD CALC: 38.7 % (ref 36.3–47.1)
HDLC SERPL-MCNC: 59 MG/DL
HEMOGLOBIN: 12.3 G/DL (ref 11.9–15.1)
KAPPA FREE LIGHT CHAINS QNT: 1.21 MG/DL (ref 0.37–1.94)
LACTATE DEHYDROGENASE: 224 U/L (ref 135–214)
LAMBDA FREE LIGHT CHAINS QNT: 4.5 MG/DL (ref 0.57–2.63)
LDL CHOLESTEROL: 167 MG/DL (ref 0–130)
MCH RBC QN AUTO: 28.6 PG (ref 25.2–33.5)
MCHC RBC AUTO-ENTMCNC: 31.8 G/DL (ref 25.2–33.5)
MCV RBC AUTO: 90 FL (ref 82.6–102.9)
NRBC AUTOMATED: 0 PER 100 WBC
PDW BLD-RTO: 14.4 % (ref 11.8–14.4)
PLATELET # BLD: 260 K/UL (ref 138–453)
PMV BLD AUTO: 11.2 FL (ref 8.1–13.5)
POTASSIUM SERPL-SCNC: 4.8 MMOL/L (ref 3.7–5.3)
RBC # BLD: 4.3 M/UL (ref 3.95–5.11)
SODIUM BLD-SCNC: 136 MMOL/L (ref 135–144)
TOTAL PROTEIN: 7.7 G/DL (ref 6.4–8.3)
TRIGL SERPL-MCNC: 201 MG/DL
VITAMIN D 25-HYDROXY: 31.4 NG/ML (ref 30–100)
VLDLC SERPL CALC-MCNC: ABNORMAL MG/DL (ref 1–30)
WBC # BLD: 6.3 K/UL (ref 3.5–11.3)

## 2020-09-21 PROCEDURE — 83615 LACTATE (LD) (LDH) ENZYME: CPT

## 2020-09-21 PROCEDURE — 83036 HEMOGLOBIN GLYCOSYLATED A1C: CPT

## 2020-09-21 PROCEDURE — 84165 PROTEIN E-PHORESIS SERUM: CPT

## 2020-09-21 PROCEDURE — 36415 COLL VENOUS BLD VENIPUNCTURE: CPT

## 2020-09-21 PROCEDURE — 80061 LIPID PANEL: CPT

## 2020-09-21 PROCEDURE — 80053 COMPREHEN METABOLIC PANEL: CPT

## 2020-09-21 PROCEDURE — 84155 ASSAY OF PROTEIN SERUM: CPT

## 2020-09-21 PROCEDURE — 83883 ASSAY NEPHELOMETRY NOT SPEC: CPT

## 2020-09-21 PROCEDURE — 85027 COMPLETE CBC AUTOMATED: CPT

## 2020-09-21 PROCEDURE — 82306 VITAMIN D 25 HYDROXY: CPT

## 2020-09-22 LAB
ALBUMIN (CALCULATED): 4.5 G/DL (ref 3.2–5.2)
ALBUMIN PERCENT: 64 % (ref 45–65)
ALPHA 1 PERCENT: 2 % (ref 3–6)
ALPHA 2 PERCENT: 9 % (ref 6–13)
ALPHA-1-GLOBULIN: 0.1 G/DL (ref 0.1–0.4)
ALPHA-2-GLOBULIN: 0.7 G/DL (ref 0.5–0.9)
BETA GLOBULIN: 0.7 G/DL (ref 0.5–1.1)
BETA PERCENT: 9 % (ref 11–19)
GAMMA GLOBULIN %: 16 % (ref 9–20)
GAMMA GLOBULIN: 1.1 G/DL (ref 0.5–1.5)
PATHOLOGIST: ABNORMAL
PROTEIN ELECTROPHORESIS, SERUM: ABNORMAL
TOTAL PROT. SUM,%: 100 % (ref 98–102)
TOTAL PROT. SUM: 7.1 G/DL (ref 6.3–8.2)
TOTAL PROTEIN: 7.1 G/DL (ref 6.4–8.3)

## 2020-09-23 ENCOUNTER — OFFICE VISIT (OUTPATIENT)
Dept: INTERNAL MEDICINE | Age: 75
End: 2020-09-23
Payer: MEDICARE

## 2020-09-23 VITALS
DIASTOLIC BLOOD PRESSURE: 72 MMHG | HEART RATE: 70 BPM | HEIGHT: 64 IN | WEIGHT: 200 LBS | BODY MASS INDEX: 34.15 KG/M2 | SYSTOLIC BLOOD PRESSURE: 120 MMHG | TEMPERATURE: 97.3 F

## 2020-09-23 PROCEDURE — 99214 OFFICE O/P EST MOD 30 MIN: CPT | Performed by: NURSE PRACTITIONER

## 2020-09-23 PROCEDURE — 99214 OFFICE O/P EST MOD 30 MIN: CPT

## 2020-09-23 RX ORDER — ZALEPLON 10 MG/1
10 CAPSULE ORAL NIGHTLY PRN
Qty: 90 CAPSULE | Refills: 1 | Status: SHIPPED | OUTPATIENT
Start: 2020-09-23 | End: 2021-03-23 | Stop reason: SDUPTHER

## 2020-09-23 RX ORDER — ESCITALOPRAM OXALATE 10 MG/1
10 TABLET ORAL DAILY
Qty: 90 TABLET | Refills: 3 | Status: SHIPPED | OUTPATIENT
Start: 2020-09-23 | End: 2021-03-23 | Stop reason: SDUPTHER

## 2020-09-23 NOTE — PROGRESS NOTES
09/23/20  Brandy Harms  1945      Chief Complaint:   1. Sleep disorder    2. Chronic major depressive disorder    3. Depression, unspecified depression type    4. History of breast cancer    5. Primary insomnia    6. Dyslipidemia    7. Primary osteoarthritis of both hands    8. Osteopenia, unspecified location    9. MGUS (monoclonal gammopathy of unknown significance)    10. Peripheral polyneuropathy    11. Vitamin D deficiency    12. IFG (impaired fasting glucose)    13. Panlobular emphysema ([address]    14. Lumbar spondylosis    15. Obesity without serious comorbidity, unspecified classification, unspecified obesity type        HPI:    53-year-old patient in for routine follow-up. Overall she has been doing well. Denies any acute concerns at present. Was set up to have her colonoscopy however she has canceled that due to the COVID-19 pandemic. Is asking to get scheduled for that again. Her mood has been stable. Denies any thoughts of harming herself or harming others. Continues with her yearly mammograms with a history of breast cancer. Declines any further follow-up with oncology. Insomnia stable on her Sonata. We reviewed her lipid panel today. Continues to decline any statin drugs. Total cholesterol and LDL is improved. States over the last month she has not really been working on diet or activity though. Neuropathy has been stable. Back pain has been stable.       Allergies   Allergen Reactions    Crestor [Rosuvastatin]      MUSCLE PAIN/ACHES      Cymbalta [Duloxetine Hcl]      Disorientation, excessive sleepiness    Influenza Vaccines Other (See Comments)     Causes problems with eating eggs    Lipitor      MUSCLE PAIN/ACHES      Meloxicam Other (See Comments)     constipation    Pravastatin      MUSCLE PAIN/ACHES    Prednisone      Lost eyelashes       Red Yeast Rice [Monascus Purpureus Went Yeast]      POORLY TOLERATED    Zetia [Ezetimibe]      CONSTIPATION      Zocor [Simvastatin] performed by Shonda Abdul MD at [address] [place] [address] Left [id number]    left sentinal node biopsy    TUBAL LIGATION         Current Outpatient Medications on File Prior to Visit   Medication Sig Dispense Refill    Calcium Citrate-Vitamin D (CALCIUM CITRATE + D PO) Take 1 tablet by mouth daily      acetaminophen (TYLENOL) 500 MG tablet Take 1,500 mg by mouth 2 times daily Indications: pt states she is taking abouot 3 to 4 500mg capsule BID      magnesium (MAGNESIUM-OXIDE) 250 MG TABS tablet Take 250 mg by mouth daily      aspirin 81 MG tablet Take 81 mg by mouth daily      B Complex Vitamins (VITAMIN B COMPLEX PO) Take by mouth daily      BLACK COHOSH HOT FLASH RELIEF PO Take 1-2 tablets by mouth daily as needed      Handicap Placard MISC by Does not apply route The disability is expected to last 2024 1 each 0     No current facility-administered medications on file prior to visit. Social History     Socioeconomic History    Marital status:      Spouse name: Not on file    Number of children: 2    Years of education: Not on file    Highest education level: Not on file   Occupational History    Not on file   Social Needs    Financial resource strain: Not on file    Food insecurity     Worry: Not on file     Inability: Not on file    Transportation needs     Medical: Not on file     Non-medical: Not on file   Tobacco Use    Smoking status: Former Smoker     Packs/day: 0.50     Years: 10.00     Pack years: 5.00     Last attempt to quit: 2014     Years since quittin.4    Smokeless tobacco: Never Used    Tobacco comment: quit 10/11 - smokes some days   Substance and Sexual Activity    Alcohol use:  Yes     Alcohol/week: 0.0 standard drinks     Comment: 1 drink a week    Drug use: No    Sexual activity: Not on file   Lifestyle    Physical activity     Days per week: Not on file     Minutes per session: Not on file    Stress: Not on file   Relationships    Social connections     Talks on phone: Not on file     Gets together: Not on file     Attends Adventism service: Not on file     Active member of club or organization: Not on file     Attends meetings of clubs or organizations: Not on file     Relationship status: Not on file    Intimate partner violence     Fear of current or ex partner: Not on file     Emotionally abused: Not on file     Physically abused: Not on file     Forced sexual activity: Not on file   Other Topics Concern    Not on file   Social History Narrative    She works ar [employer]. 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 She has 2 adult sons, 4 grandkids. Review of Systems   Constitutional: Negative for activity change, appetite change, chills, fatigue, fever and unexpected weight change. HENT: Negative for congestion, dental problem, ear discharge, ear pain, facial swelling, hearing loss, postnasal drip, rhinorrhea, sinus pressure, sore throat and trouble swallowing. Eyes: Negative for pain and visual disturbance. Respiratory: Negative for cough, chest tightness, shortness of breath and wheezing. Cardiovascular: Negative for chest pain, palpitations and leg swelling. Gastrointestinal: Negative for abdominal pain, blood in stool, constipation, diarrhea, nausea and vomiting. Endocrine: Negative for cold intolerance, heat intolerance and polyuria. Genitourinary: Negative for difficulty urinating. Musculoskeletal: Negative for arthralgias, gait problem, myalgias, neck pain and neck stiffness. Skin: Negative for color change, rash and wound. Neurological: Negative for dizziness, tremors, seizures, weakness, light-headedness, numbness and headaches. Psychiatric/Behavioral: Negative for confusion and hallucinations. The patient is not nervous/anxious. Physical Exam  Vitals signs and nursing note reviewed.    Constitutional: General: She is not in acute distress. Appearance: Normal appearance. She is well-developed. She is not diaphoretic. HENT:      Head: Normocephalic and atraumatic. Right Ear: External ear normal.      Left Ear: External ear normal.   Eyes:      General:         Right eye: No discharge. Left eye: No discharge. Neck:      Trachea: No tracheal deviation. Cardiovascular:      Rate and Rhythm: Normal rate and regular rhythm. Heart sounds: Normal heart sounds. No murmur. No friction rub. No gallop. Pulmonary:      Effort: Pulmonary effort is normal. No respiratory distress. Breath sounds: Normal breath sounds. No stridor. No wheezing, rhonchi or rales. Chest:      Chest wall: No tenderness. Abdominal:      General: Bowel sounds are normal. There is no distension. Palpations: Abdomen is soft. Tenderness: There is no abdominal tenderness. Musculoskeletal:         General: No swelling. Skin:     General: Skin is warm and dry. Capillary Refill: Capillary refill takes less than 2 seconds. Coloration: Skin is not jaundiced or pale. Findings: No rash. Neurological:      General: No focal deficit present. Mental Status: She is alert and oriented to person, place, and time. Cranial Nerves: No cranial nerve deficit. Sensory: No sensory deficit. Coordination: Coordination normal.   Psychiatric:         Mood and Affect: Mood normal.         Behavior: Behavior normal.         Thought Content: Thought content normal.         Judgment: Judgment normal.       Vitals:    09/23/20 1338   BP: 120/72   Site: Left Upper Arm   Position: Sitting   Cuff Size: Large Adult   Pulse: 70   Temp: 97.3 °F (36.3 °C)   TempSrc: Temporal   Weight: 200 lb (90.7 kg)   Height: 5' 3.5\" (1.613 m)       Assessment:  1. Sleep disorder  Stable on Sonata  - zaleplon (SONATA) 10 MG capsule; Take 1 capsule by mouth nightly as needed (sleep). Dispense: 90 capsule;  Refill: 1    2. Chronic major depressive disorder   escitalopram (LEXAPRO) 10 MG tablet; Take 1 tablet by mouth daily  Dispense: 90 tablet; Refill: 3    3. Depression, unspecified depression type  Stable on Lexapro. Refill provided    4. History of breast cancer  Not on any anti-hormonal therapy due to significant side effects with multiple agents. These have all been discontinued as of May 2016. Due for her yearly mammogram in November. Continues to decline any further follow-up with oncology unless an issue arises    5. Primary insomnia  Stable on Sonata    6. Dyslipidemia  The 10-year ASCVD risk score (Khadar Mejía, et al., 2013) is: 16.2%    Values used to calculate the score:      Age: 67 years      Sex: Female      Is Non- : No      Diabetic: No      Tobacco smoker: No      Systolic Blood Pressure: 686 mmHg      Is BP treated: No      HDL Cholesterol: 59 mg/dL      Total Cholesterol: 266 mg/dL  Continue to work on diet remain active. Continues to decline any medications for cholesterol due to significant myalgias    7. Primary osteoarthritis of both hands  Stable with intermittent p.o. diclofenac tablets and when not bad uses the topical gel. 8. Osteopenia, unspecified location  Continues on calcium and vitamin D.  DEXA scan reviewed. Previously on Fosamax which she has been off since January 2012. 9. MGUS (monoclonal gammopathy of unknown significance)  Continues to decline follow-up with hematology. Serial lab follow-ups in 3 months  - [place]/Lambda Quant Free Light Chains Serum; Future    10. Peripheral polyneuropathy  Stable at present    11. Vitamin D deficiency  Stable on supplemental vitamin D    12. IFG (impaired fasting glucose)  Labs stable, continue to work on diet and change    13. Panlobular emphysema (HCC)  Stable with no recent exacerbations. 14. Lumbar spondylosis  Status post lumbar fusion.     15. Obesity without serious comorbidity, unspecified classification,

## 2020-09-24 ASSESSMENT — ENCOUNTER SYMPTOMS
TROUBLE SWALLOWING: 0
DIARRHEA: 0
EYE PAIN: 0
SHORTNESS OF BREATH: 0
FACIAL SWELLING: 0
BLOOD IN STOOL: 0
WHEEZING: 0
ABDOMINAL PAIN: 0
SINUS PRESSURE: 0
NAUSEA: 0
SORE THROAT: 0
COUGH: 0
RHINORRHEA: 0
CHEST TIGHTNESS: 0
CONSTIPATION: 0
COLOR CHANGE: 0
VOMITING: 0

## 2020-10-05 ENCOUNTER — NURSE ONLY (OUTPATIENT)
Dept: SURGERY | Age: 75
End: 2020-10-05
Payer: MEDICARE

## 2020-10-05 ENCOUNTER — TELEPHONE (OUTPATIENT)
Dept: SURGERY | Age: 75
End: 2020-10-05

## 2020-10-05 VITALS
TEMPERATURE: 96.7 F | BODY MASS INDEX: 33.46 KG/M2 | HEART RATE: 64 BPM | SYSTOLIC BLOOD PRESSURE: 118 MMHG | RESPIRATION RATE: 16 BRPM | DIASTOLIC BLOOD PRESSURE: 68 MMHG | HEIGHT: 64 IN | WEIGHT: 196 LBS

## 2020-10-05 PROCEDURE — 99215 OFFICE O/P EST HI 40 MIN: CPT

## 2020-10-05 NOTE — TELEPHONE ENCOUNTER
[facility]         Patient:Brooke Lou           LXZ:[id number]           Surgical/Procedure Planned: Colonoscopy    Date & Location: 11/9/2020 at [facility]       Outpatient   Planned Length of OR: 30 minutes    Sedation: intravenous sedation        Estimated Cardiac Risk for Non-Cardiac Surgery/Procedure     Low__X____ Moderate______ High______    Medication Instructions - Clarification needed by this date:       ASA 81 mg daily Hold _5__ Days      Resume medications:     Lovenox indicated: _____Yes __x___NO    Provider:Dr. Amy Singleton of Provider Giving Orders for Medication holds:  Electronically signed by NICOLASA Aguayo CNP on 10/5/2020 at 3:26 PM    _____________________________________________

## 2020-10-05 NOTE — TELEPHONE ENCOUNTER
Patient returned call to Dr Seb Sanchez nurse about meds prior to colonoscopy scheduled for 11/9/2020.   She also has questions about her Covid testing

## 2020-10-05 NOTE — PROGRESS NOTES
H &P Colonoscopy  Patient:Brooke Haley                 :1945  (Yes) patient has seen Dr. Caitie Humphries prior to procedure  PCP: ALEXYS Nava    CC: hx of colon polyps        HPI:        Colonoscopy  Abd pain: no  Anemia: no  Bloating:no  Diarrhea: no  Constipation: no  Melena: no  Hematochezia:no  Rectal Bleeding:no  Rectal/Anal Pain:no  Pruritus: no  Family history colon Cancer: yes, mother  Previous colon cancer: no  Previous Colon Polyp: yes  Change in bowels: no  Decrease caliber of stool: no  Change in color of stool: no    Previous work up date: Colonoscopy 2015. Dr. Caitie Humphries at [facility]= sigmoid diverticulosis and 1x hyperplastic polyp    Family History   Problem Relation Age of Onset    Cancer Mother         COLON CANCER    Heart Attack Father         LATE 70'S    Diabetes Neg Hx     Breast Cancer Neg Hx     Cataracts Neg Hx     Glaucoma Neg Hx      Social History     Socioeconomic History    Marital status:      Spouse name: Not on file    Number of children: 2    Years of education: Not on file    Highest education level: Not on file   Occupational History    Not on file   Social Needs    Financial resource strain: Not on file    Food insecurity     Worry: Not on file     Inability: Not on file    Transportation needs     Medical: Not on file     Non-medical: Not on file   Tobacco Use    Smoking status: Former Smoker     Packs/day: 0.50     Years: 10.00     Pack years: 5.00     Last attempt to quit: 2014     Years since quittin.4    Smokeless tobacco: Never Used    Tobacco comment: quit 10/11 - smokes some days   Substance and Sexual Activity    Alcohol use:  Yes     Alcohol/week: 0.0 standard drinks     Comment: 1 drink a week    Drug use: No    Sexual activity: Not on file   Lifestyle    Physical activity     Days per week: Not on file     Minutes per session: Not on file    Stress: Not on file   Relationships    Social connections     Talks on phone: Not on file     Gets together: Not on file     Attends Druze service: Not on file     Active member of club or organization: Not on file     Attends meetings of clubs or organizations: Not on file     Relationship status: Not on file    Intimate partner violence     Fear of current or ex partner: Not on file     Emotionally abused: Not on file     Physically abused: Not on file     Forced sexual activity: Not on file   Other Topics Concern    Not on file   Social History Narrative    She works ar [employer] on [employer]. 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 She has 2 adult sons, 4 grandkids.      Past Surgical History:   Procedure Laterality Date    APPENDECTOMY      BACK SURGERY  10/11/2019    BREAST BIOPSY Left 11-12-14    US guided brest bx - benign    BREAST LUMPECTOMY Left 02/16/2015    with needle placement- MUCINOUS CARCINOMA- shw    COLONOSCOPY  02/28/03    normal-reed    COLONOSCOPY  10/30/98    normal-reed    COLONOSCOPY  2008    normal, family hx colon cancer- al-jadda    COLONOSCOPY  2/2015    6 cm polyp-benign, repeat 5yrs- dennis    EYE SURGERY      FOOT SURGERY Right 1990's, 2002    [place] [place]     HYSTERECTOMY      WITH BILATERAL SALPINGO OOPHORECTOMY    KNEE ARTHROSCOPY  2011    RIGHT KNEE    LASIK Bilateral     LUMBAR SPINE SURGERY Right 6/21/2019    Right L4 L5 TRANSFORAMINAL performed by Gabriel Palmer MD at [facility] Right 7/23/2019    Right L4 & L5 TRANSFORAMINAL performed by Gabriel Palmer MD at [address] [place] [address] Left [id number]    left sentinal node biopsy    TUBAL LIGATION  08/74     Past Medical History:   Diagnosis Date    Breast CA ([facility]     2/15 s/p XRT & Dr Hackett Congress following    COPD (chronic obstructive pulmonary disease) ([facility] 03/04    mild obstructive defect on PFT's    Depression     Dyslipidemia     Hyperplastic colon polyp     Colonoscopy 2/16/15 with 10/05/2020   Allergen Reaction Noted    Crestor [rosuvastatin]  06/27/2013    Cymbalta [duloxetine hcl]  09/05/2019    Influenza vaccines Other (See Comments) 06/27/2013    Lipitor  06/27/2013    Meloxicam Other (See Comments) 01/20/2015    Pravastatin  06/27/2013    Prednisone  02/13/2019    Red yeast rice [monascus purpureus went yeast]  06/27/2013    Zetia [ezetimibe]  06/27/2013    Zocor [simvastatin]  06/27/2013    Floxin [ofloxacin] Nausea And Vomiting 06/27/2013    Gabapentin Rash 11/05/2014           PEx:                ASSESSMENT:        PLAN:

## 2020-10-05 NOTE — TELEPHONE ENCOUNTER
LM on patient's voicemail to go over medication hold for her upcoming procedure. Clinic number provided on voicemail.

## 2020-10-06 NOTE — TELEPHONE ENCOUNTER
LM on patient's voicemail stating I was returning her call from yesterday afternoon. Clinic number provided on voicemail.

## 2020-10-06 NOTE — TELEPHONE ENCOUNTER
Contacted patient and instructed her to hold her aspirin 5 days prior to procedure. Patient verbalized understanding. Patient also has questions about labs that she was informed of to get done. Patient is not aware of any lab work that she was to have. She asked to be transferred to [facility] office to speak with their nurse. Transferred call to [facility].

## 2020-10-13 ENCOUNTER — PRE-PROCEDURE TELEPHONE (OUTPATIENT)
Dept: PREADMISSION TESTING | Age: 75
End: 2020-10-13

## 2020-11-04 ENCOUNTER — HOSPITAL ENCOUNTER (OUTPATIENT)
Dept: PREADMISSION TESTING | Age: 75
Setting detail: SPECIMEN
Discharge: HOME | End: 2020-11-08
Payer: MEDICARE

## 2020-11-04 PROCEDURE — U0003 INFECTIOUS AGENT DETECTION BY NUCLEIC ACID (DNA OR RNA); SEVERE ACUTE RESPIRATORY SYNDROME CORONAVIRUS 2 (SARS-COV-2) (CORONAVIRUS DISEASE [COVID-19]), AMPLIFIED PROBE TECHNIQUE, MAKING USE OF HIGH THROUGHPUT TECHNOLOGIES AS DESCRIBED BY CMS-2020-01-R: HCPCS

## 2020-11-05 LAB
SARS-COV-2, RAPID: NORMAL
SARS-COV-2: NORMAL
SARS-COV-2: NOT DETECTED
SOURCE: NORMAL

## 2020-11-09 ENCOUNTER — HOSPITAL ENCOUNTER (OUTPATIENT)
Age: 75
Setting detail: OUTPATIENT SURGERY
Discharge: HOME | End: 2020-11-09
Attending: SURGERY | Admitting: SURGERY
Payer: MEDICARE

## 2020-11-09 ENCOUNTER — ANESTHESIA EVENT (OUTPATIENT)
Dept: OPERATING ROOM | Age: 75
End: 2020-11-09
Payer: MEDICARE

## 2020-11-09 ENCOUNTER — ANESTHESIA (OUTPATIENT)
Dept: OPERATING ROOM | Age: 75
End: 2020-11-09
Payer: MEDICARE

## 2020-11-09 VITALS
OXYGEN SATURATION: 100 % | HEART RATE: 57 BPM | SYSTOLIC BLOOD PRESSURE: 152 MMHG | WEIGHT: 196 LBS | HEIGHT: 63 IN | TEMPERATURE: 96.8 F | BODY MASS INDEX: 34.73 KG/M2 | RESPIRATION RATE: 16 BRPM | DIASTOLIC BLOOD PRESSURE: 67 MMHG

## 2020-11-09 VITALS — SYSTOLIC BLOOD PRESSURE: 112 MMHG | DIASTOLIC BLOOD PRESSURE: 59 MMHG | OXYGEN SATURATION: 98 % | TEMPERATURE: 97.2 F

## 2020-11-09 PROCEDURE — 7100000011 HC PHASE II RECOVERY - ADDTL 15 MIN: Performed by: SURGERY

## 2020-11-09 PROCEDURE — 2580000003 HC RX 258: Performed by: NURSE ANESTHETIST, CERTIFIED REGISTERED

## 2020-11-09 PROCEDURE — 6360000002 HC RX W HCPCS: Performed by: NURSE ANESTHETIST, CERTIFIED REGISTERED

## 2020-11-09 PROCEDURE — 3700000000 HC ANESTHESIA ATTENDED CARE: Performed by: SURGERY

## 2020-11-09 PROCEDURE — 7100000010 HC PHASE II RECOVERY - FIRST 15 MIN: Performed by: SURGERY

## 2020-11-09 PROCEDURE — 45380 COLONOSCOPY AND BIOPSY: CPT | Performed by: SURGERY

## 2020-11-09 PROCEDURE — 3700000001 HC ADD 15 MINUTES (ANESTHESIA): Performed by: SURGERY

## 2020-11-09 PROCEDURE — 3609010300 HC COLONOSCOPY W/BIOPSY SINGLE/MULTIPLE: Performed by: SURGERY

## 2020-11-09 PROCEDURE — 2709999900 HC NON-CHARGEABLE SUPPLY: Performed by: SURGERY

## 2020-11-09 PROCEDURE — 88305 TISSUE EXAM BY PATHOLOGIST: CPT

## 2020-11-09 RX ORDER — PROPOFOL 10 MG/ML
INJECTION, EMULSION INTRAVENOUS PRN
Status: DISCONTINUED | OUTPATIENT
Start: 2020-11-09 | End: 2020-11-09 | Stop reason: SDUPTHER

## 2020-11-09 RX ORDER — SODIUM CHLORIDE, SODIUM LACTATE, POTASSIUM CHLORIDE, CALCIUM CHLORIDE 600; 310; 30; 20 MG/100ML; MG/100ML; MG/100ML; MG/100ML
INJECTION, SOLUTION INTRAVENOUS CONTINUOUS
Status: CANCELLED | OUTPATIENT
Start: 2020-11-09

## 2020-11-09 RX ORDER — SODIUM CHLORIDE, SODIUM LACTATE, POTASSIUM CHLORIDE, CALCIUM CHLORIDE 600; 310; 30; 20 MG/100ML; MG/100ML; MG/100ML; MG/100ML
INJECTION, SOLUTION INTRAVENOUS CONTINUOUS PRN
Status: DISCONTINUED | OUTPATIENT
Start: 2020-11-09 | End: 2020-11-09 | Stop reason: SDUPTHER

## 2020-11-09 RX ADMIN — SODIUM CHLORIDE, POTASSIUM CHLORIDE, SODIUM LACTATE AND CALCIUM CHLORIDE: 600; 310; 30; 20 INJECTION, SOLUTION INTRAVENOUS at 10:36

## 2020-11-09 RX ADMIN — PROPOFOL 300 MG: 10 INJECTION, EMULSION INTRAVENOUS at 10:42

## 2020-11-09 ASSESSMENT — PAIN - FUNCTIONAL ASSESSMENT: PAIN_FUNCTIONAL_ASSESSMENT: 0-10

## 2020-11-09 ASSESSMENT — PAIN SCALES - GENERAL
PAINLEVEL_OUTOF10: 0
PAINLEVEL_OUTOF10: 0

## 2020-11-09 ASSESSMENT — PAIN DESCRIPTION - DESCRIPTORS: DESCRIPTORS: ACHING

## 2020-11-09 NOTE — ANESTHESIA PRE PROCEDURE
Department of Anesthesiology  Preprocedure Note       Name:  Sierra Do   Age:  67 y.o.  :  1945                                          MRN:  [id number]         Date:  2020      Surgeon: Yves Fernando):  Roberto Womack MD    Procedure: Procedure(s):  Colonoscopy    Medications prior to admission:   Prior to Admission medications    Medication Sig Start Date End Date Taking? Authorizing Provider   Calcium Citrate-Vitamin D (CALCIUM CITRATE + D PO) Take 1 tablet by mouth daily    Historical Provider, MD   zaleplon (SONATA) 10 MG capsule Take 1 capsule by mouth nightly as needed (sleep). 20  NICOLASA Vitale CNP   escitalopram (LEXAPRO) 10 MG tablet Take 1 tablet by mouth daily 20   NICOLASA Vitale CNP   diclofenac (VOLTAREN) 50 MG EC tablet Take 1 tablet by mouth daily 20   NICOLASA Vitale CNP   diclofenac sodium (VOLTAREN) 1 % GEL Apply 2 g topically daily as needed for Pain (alternates with oral diclofenac) 20   NICOLASA Vitale CNP   Handicap Placard MISC by Does not apply route The disability is expected to last 2024   NICOLASA Vitale CNP   acetaminophen (TYLENOL) 500 MG tablet Take 1,500 mg by mouth 2 times daily Indications: pt states she is taking abouot 3 to 4 500mg capsule BID    Historical Provider, MD   magnesium (MAGNESIUM-OXIDE) 250 MG TABS tablet Take 250 mg by mouth daily    Historical Provider, MD   aspirin 81 MG tablet Take 81 mg by mouth daily    Historical Provider, MD   B Complex Vitamins (VITAMIN B COMPLEX PO) Take by mouth daily    Historical Provider, MD   BLACK COHOSH HOT FLASH RELIEF PO Take 1-2 tablets by mouth daily as needed    Historical Provider, MD       Current medications:    No current facility-administered medications for this encounter. Allergies:     Allergies   Allergen Reactions    Crestor [Rosuvastatin]      MUSCLE PAIN/ACHES      Cymbalta [Duloxetine Hcl] Disorientation, excessive sleepiness    Influenza Vaccines Other (See Comments)     Causes problems with eating eggs    Lipitor      MUSCLE PAIN/ACHES      Meloxicam Other (See Comments)     constipation    Pravastatin      MUSCLE PAIN/ACHES    Prednisone      Lost eyelashes       Red Yeast Rice [Monascus Purpureus Went Yeast]      POORLY TOLERATED    Zetia [Ezetimibe]      CONSTIPATION      Zocor [Simvastatin]      MUSCLE PAIN/ACHES    Floxin [Ofloxacin] Nausea And Vomiting    Gabapentin Rash       Problem List:    Patient Active Problem List   Diagnosis Code    Insomnia G47.00    Dyslipidemia E78.5    Osteoarthritis M19.90    Menopausal symptoms N95.1    Osteopenia M85.80    COPD [facility]ch[facility]) [facility] J44.9    MGUS (monoclonal gammopathy of unknown significance) D47.2    Peripheral neuropathy G62.9    Vitamin D deficiency E55.9    Hallux limitus of right foot M20.5X1    Chronic major depressive disorder F32.9    Primary osteoarthritis of both hands M19.041, M19.042    IFG (impaired fasting glucose) R73.01    History of hand x-ray-suggesting inflammatory arthritis Z92.89    Hand arthritis M19.049    Lumbar radiculitis M54.16    Lumbar spondylosis M47.816    Chronic right-sided low back pain with right-sided sciatica M54.41, G89.29    History of breast cancer Z85.3       Past Medical History:        Diagnosis Date    Breast CA ([facility]     2/15 s/p XRT & Dr Jameson Acosta following    COPD (chronic obstructive pulmonary disease) ([facility] 03/04    mild obstructive defect on PFT's    Depression     Dyslipidemia     Hyperplastic colon polyp     Colonoscopy 2/16/15 with repeat recommended 2/16/2020    IFG (impaired fasting glucose) 5/9/2017    Insomnia     MGUS (monoclonal gammopathy of unknown significance)     0.64 grams/decilier IgG lambda, 04/12. 1.) UPUP negative 04/12.   Stable July 2014    Osteoarthritis     particularly in the hands    Osteopenia     DEXA, 04, T -1.7 spine, T -0.2  hip 1.) T -1.6 spine, T -0.2 hip,  2.) Repeat DEXA scan, 10/09, with -1.3 spine, T -0.1 hip. 3.) DEXA, , T -1.4 spine, T -0.0 hip. 4.) Treated with Fosamax x 8 yrs, discontinued  repeat DEXA 10/14 FRAX 2.4% 10 year risk at hip    Peripheral neuropathy     with mild to moderate sensorimotor peripheral polyneuropathy noted on EMG,     Vitamin D deficiency        Past Surgical History:        Procedure Laterality Date    APPENDECTOMY      BACK SURGERY  10/11/2019    BREAST BIOPSY Left 14    US guided brest bx - benign    BREAST LUMPECTOMY Left 2015    with needle placement- MUCINOUS CARCINOMA- shw    COLONOSCOPY  03    normal-reed    COLONOSCOPY  10/30/98    normal-reed    COLONOSCOPY      normal, family hx colon cancer- al-kay    COLONOSCOPY  2015    6 cm polyp-benign, repeat 5yrs- dennis    EYE SURGERY      FOOT SURGERY Right s,     [facility] [place]     HYSTERECTOMY      WITH BILATERAL SALPINGO OOPHORECTOMY    KNEE ARTHROSCOPY  2011    RIGHT KNEE    LASIK Bilateral     LUMBAR SPINE SURGERY Right 2019    Right L4 L5 TRANSFORAMINAL performed by Jannie Drew MD at [facility] Right 2019    Right L4 & L5 TRANSFORAMINAL performed by Jannie Drew MD at [facility] Left [id number]    left sentinal node biopsy    TUBAL LIGATION         Social History:    Social History     Tobacco Use    Smoking status: Former Smoker     Packs/day: 0.50     Years: 10.00     Pack years: 5.00     Last attempt to quit: 2014     Years since quittin.5    Smokeless tobacco: Never Used    Tobacco comment: quit 10/11 - smokes some days   Substance Use Topics    Alcohol use: Yes     Alcohol/week: 0.0 standard drinks     Comment: 1 drink a week                                Counseling given: Not Answered  Comment: quit 10/11 - smokes some days      Vital Signs (Current):  There were no vitals filed for this visit. BP Readings from Last 3 Encounters:   10/05/20 118/68   09/23/20 120/72   03/18/20 122/78       NPO Status:                                                                                 BMI:   Wt Readings from Last 3 Encounters:   10/05/20 196 lb (88.9 kg)   09/23/20 200 lb (90.7 kg)   03/18/20 199 lb 3.2 oz (90.4 kg)     There is no height or weight on file to calculate BMI.    CBC:   Lab Results   Component Value Date    WBC 6.3 09/21/2020    RBC 4.30 09/21/2020    HGB 12.3 09/21/2020    HCT 38.7 09/21/2020    MCV 90.0 09/21/2020    RDW 14.4 09/21/2020     09/21/2020       CMP:   Lab Results   Component Value Date     09/21/2020    K 4.8 09/21/2020     09/21/2020    CO2 25 09/21/2020    BUN 18 09/21/2020    CREATININE 0.85 09/21/2020    GFRAA >60 09/21/2020    LABGLOM >60 09/21/2020    GLUCOSE 91 09/21/2020    PROT 7.7 09/21/2020    PROT 7.1 09/21/2020    PROT 7.0 04/18/2012    CALCIUM 9.5 09/21/2020    BILITOT 0.29 09/21/2020    ALKPHOS 52 09/21/2020    AST 22 09/21/2020    ALT 18 09/21/2020       POC Tests: No results for input(s): POCGLU, POCNA, POCK, POCCL, POCBUN, POCHEMO, POCHCT in the last 72 hours.     Coags:   Lab Results   Component Value Date    PROTIME 10.9 11/11/2014    INR 1.0 11/11/2014    APTT 27.8 11/11/2014       HCG (If Applicable): No results found for: PREGTESTUR, PREGSERUM, HCG, HCGQUANT     ABGs: No results found for: PHART, PO2ART, JBH2VET, MAR4ATQ, BEART, Q5CSVNWU     Type & Screen (If Applicable):  No results found for: LABABO, LABRH    Drug/Infectious Status (If Applicable):  No results found for: HIV, HEPCAB    COVID-19 Screening (If Applicable):   Lab Results   Component Value Date    COVID19 Not Detected 11/04/2020         Anesthesia Evaluation  Patient summary reviewed no history of anesthetic complications:   Airway: Mallampati: II  TM distance: >3 FB   Neck ROM: full  Mouth opening: > = 3 FB Dental: normal exam         Pulmonary:normal exam    (+) COPD:                             Cardiovascular:Negative CV ROS          ECG reviewed                        Neuro/Psych:   (+) neuromuscular disease:, psychiatric history:            GI/Hepatic/Renal:             Endo/Other: Negative Endo/Other ROS   (+) : arthritis: OA and no interval change. , malignancy/cancer. Abdominal:           Vascular:                                        Anesthesia Plan      general and TIVA     ASA 3       Induction: intravenous. Anesthetic plan and risks discussed with patient.       Plan discussed with surgical team.                  NICOLASA Pedroza - CRNA   11/9/2020

## 2020-11-09 NOTE — OP NOTE
COLONOSCOPY PROCEDURE NOTE:      Pre op diagnosis:    1. Hx of polyps  2. Last CS 2015    Operative Procedure:    1. Colonoscopy with cold bx    Surgeon:    Dr. Forbes Phalen     Anesthesia:    IV sedation per CRNA    EBL:  minimal    Procedure:    Patient was taken to the endoscopy suite and placed in a left lateral decubitus position. They were given IV sedation as mentioned above. A digital rectal exam was performed. There were no masses and anal tone was normal.  The colonoscope was inserted into the rectum and carefully manipulated up through the sigmoid colon, transverse colon, right colon and into the cecum. The cecum was identified by the ileal cecal valve and transabdominal illumination. Then the scope was slowly withdrawn. The scope was retroflexed in the rectum and the dentate line was examined. The scope was removed. The patient tolerated the procedure well. The following findings were noted. Final Diagnosis:    1. Diverticulosis  2.  5 mm polyp    Plan:    1. Await bx  2. Repeat CS in 5 years due to hx of polyps    ADDENDUM:  Endo Review :  12/13/2020    Pathology:    -- Diagnosis --   POLYP, COLORECTAL (RECTUM):        -HYPERPLASTIC POLYP. Plan:    1.   As above in the op note plan

## 2020-11-09 NOTE — H&P
Not on file   Relationships    Social connections       Talks on phone: Not on file       Gets together: Not on file       Attends Congregational service: Not on file       Active member of club or organization: Not on file       Attends meetings of clubs or organizations: Not on file       Relationship status: Not on file    Intimate partner violence       Fear of current or ex partner: Not on file       Emotionally abused: Not on file       Physically abused: Not on file       Forced sexual activity: Not on file   Other Topics Concern    Not on file   Social History Narrative     She works ar [email or web address] on [employer]. 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 She has 2 adult sons, 4 grandkids.         Past Surgical History         Past Surgical History:   Procedure Laterality Date    APPENDECTOMY        BACK SURGERY   10/11/2019    BREAST BIOPSY Left 11-12-14     US guided brest bx - benign    BREAST LUMPECTOMY Left 02/16/2015     with needle placement- MUCINOUS CARCINOMA- shw    COLONOSCOPY   02/28/03     normal-reed    COLONOSCOPY   10/30/98     normal-reed    COLONOSCOPY   2008     normal, family hx colon cancer- al-celestineguerda    COLONOSCOPY   2/2015     6 cm polyp-benign, repeat 5yrs- dennis    EYE SURGERY        [facility] Right 1990's, [facility]     [facility]     HYSTERECTOMY         WITH BILATERAL SALPINGO OOPHORECTOMY    KNEE ARTHROSCOPY   2011     RIGHT KNEE    LASIK Bilateral      LUMBAR SPINE SURGERY Right 6/21/2019     Right L4 L5 TRANSFORAMINAL performed by Kelly Pace MD at [place] [address] Right 7/23/2019     Right L4 & L5 TRANSFORAMINAL performed by Kelly Pace MD at [address] [place] [address] Left [id number]     left sentinal node biopsy    TUBAL LIGATION   08/74        Past Medical History        Past Medical History:   Diagnosis Date    Breast CA ([address]       2/15 s/p XRT & Dr Vianca Hay following    COPD (chronic obstructive pulmonary disease) ([facility] 03/04     mild obstructive defect on PFT's    Depression      Dyslipidemia      Hyperplastic colon polyp       Colonoscopy 2/16/15 with repeat recommended 2/16/2020    IFG (impaired fasting glucose) 5/9/2017    Insomnia      MGUS (monoclonal gammopathy of unknown significance)       0.64 grams/decilier IgG lambda, 04/12. 1.) UPUP negative 04/12. Stable July 2014    Osteoarthritis       particularly in the hands    Osteopenia       DEXA, 01/11/04, T -1.7 spine, T -0.2  hip 1.) T -1.6 spine, T -0.2 hip, 05/07 2.) Repeat DEXA scan, 10/09, with -1.3 spine, T -0.1 hip. 3.) DEXA, 04/12, T -1.4 spine, T -0.0 hip. 4.) Treated with Fosamax x 8 yrs, discontinued 01/12 repeat DEXA 10/14 FRAX 2.4% 10 year risk at hip    Peripheral neuropathy       with mild to moderate sensorimotor peripheral polyneuropathy noted on EMG, 11/11    Vitamin D deficiency                 Current Outpatient Medications on File Prior to Visit   Medication Sig Dispense Refill    Calcium Citrate-Vitamin D (CALCIUM CITRATE + D PO) Take 1 tablet by mouth daily        zaleplon (SONATA) 10 MG capsule Take 1 capsule by mouth nightly as needed (sleep).  90 capsule 1    escitalopram (LEXAPRO) 10 MG tablet Take 1 tablet by mouth daily 90 tablet 3    diclofenac (VOLTAREN) 50 MG EC tablet Take 1 tablet by mouth daily 90 tablet 3    diclofenac sodium (VOLTAREN) 1 % GEL Apply 2 g topically daily as needed for Pain (alternates with oral diclofenac) 3 Tube 3    acetaminophen (TYLENOL) 500 MG tablet Take 1,500 mg by mouth 2 times daily Indications: pt states she is taking abouot 3 to 4 500mg capsule BID        magnesium (MAGNESIUM-OXIDE) 250 MG TABS tablet Take 250 mg by mouth daily        aspirin 81 MG tablet Take 81 mg by mouth daily        B Complex Vitamins (VITAMIN B COMPLEX PO) Take by mouth daily        BLACK COHOSH HOT FLASH RELIEF PO Take 1-2 tablets by mouth daily as needed

## 2020-11-11 LAB — SURGICAL PATHOLOGY REPORT: NORMAL

## 2020-12-07 ENCOUNTER — TELEPHONE (OUTPATIENT)
Dept: SURGERY | Age: 75
End: 2020-12-07

## 2020-12-07 NOTE — TELEPHONE ENCOUNTER
12/7/2020 patient states she had a colonoscopy with Dr Julieta Velazquez 11/9/2020 and is still waiting on results.   Please call her back at [phone]

## 2020-12-15 NOTE — LETTER
[facility]  [address], [address]  Phone [phone]  Fax [phone]      Dr. Natalie Hernández    12/15/2020    Dear John Sanchez,    Dr. Natalie Hernández reviewed the results of your recent Colonoscopy on 11/9/2020, which showed diverticulosis and a benign (no cancer) polyp. His recommendations are to be scoped again in 5 years, due to history of colon polyps. If you have any questions or concerns regarding your test results or our recommendations, please call the office at [phone].     Sincerely,      AUBREY Khan

## 2020-12-16 ENCOUNTER — HOSPITAL ENCOUNTER (OUTPATIENT)
Dept: LAB | Age: 75
Discharge: HOME | End: 2020-12-16
Payer: MEDICARE

## 2020-12-16 ENCOUNTER — HOSPITAL ENCOUNTER (OUTPATIENT)
Dept: MAMMOGRAPHY | Age: 75
Discharge: HOME | End: 2020-12-18
Payer: MEDICARE

## 2020-12-16 LAB
FREE KAPPA/LAMBDA RATIO: 0.26 (ref 0.26–1.65)
KAPPA FREE LIGHT CHAINS QNT: 1.37 MG/DL (ref 0.37–1.94)
LAMBDA FREE LIGHT CHAINS QNT: 5.18 MG/DL (ref 0.57–2.63)

## 2020-12-16 PROCEDURE — 83883 ASSAY NEPHELOMETRY NOT SPEC: CPT

## 2020-12-16 PROCEDURE — 36415 COLL VENOUS BLD VENIPUNCTURE: CPT

## 2020-12-16 PROCEDURE — 77063 BREAST TOMOSYNTHESIS BI: CPT

## 2021-01-11 ENCOUNTER — OFFICE VISIT (OUTPATIENT)
Dept: ONCOLOGY | Age: 76
End: 2021-01-11
Payer: MEDICARE

## 2021-01-11 ENCOUNTER — HOSPITAL ENCOUNTER (OUTPATIENT)
Dept: LAB | Age: 76
Discharge: HOME | End: 2021-01-11
Payer: MEDICARE

## 2021-01-11 VITALS
BODY MASS INDEX: 36.07 KG/M2 | TEMPERATURE: 97.2 F | SYSTOLIC BLOOD PRESSURE: 138 MMHG | DIASTOLIC BLOOD PRESSURE: 72 MMHG | WEIGHT: 203.6 LBS | OXYGEN SATURATION: 98 % | RESPIRATION RATE: 16 BRPM | HEIGHT: 63 IN | HEART RATE: 74 BPM

## 2021-01-11 DIAGNOSIS — D47.2 MGUS (MONOCLONAL GAMMOPATHY OF UNKNOWN SIGNIFICANCE): Primary | ICD-10-CM

## 2021-01-11 DIAGNOSIS — D47.2 MGUS (MONOCLONAL GAMMOPATHY OF UNKNOWN SIGNIFICANCE): ICD-10-CM

## 2021-01-11 DIAGNOSIS — Z85.3 HISTORY OF BREAST CANCER: ICD-10-CM

## 2021-01-11 PROCEDURE — 84165 PROTEIN E-PHORESIS SERUM: CPT

## 2021-01-11 PROCEDURE — 83883 ASSAY NEPHELOMETRY NOT SPEC: CPT

## 2021-01-11 PROCEDURE — 36415 COLL VENOUS BLD VENIPUNCTURE: CPT

## 2021-01-11 PROCEDURE — 84155 ASSAY OF PROTEIN SERUM: CPT

## 2021-01-11 PROCEDURE — 99213 OFFICE O/P EST LOW 20 MIN: CPT | Performed by: INTERNAL MEDICINE

## 2021-01-11 PROCEDURE — 82784 ASSAY IGA/IGD/IGG/IGM EACH: CPT

## 2021-01-11 PROCEDURE — 99214 OFFICE O/P EST MOD 30 MIN: CPT | Performed by: INTERNAL MEDICINE

## 2021-01-11 PROCEDURE — 86334 IMMUNOFIX E-PHORESIS SERUM: CPT

## 2021-01-11 RX ORDER — IBUPROFEN 200 MG
200 TABLET ORAL EVERY 6 HOURS PRN
COMMUNITY
End: 2021-10-25 | Stop reason: ALTCHOICE

## 2021-01-11 NOTE — PROGRESS NOTES
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 o.female  initially seen in consultation by Dr. Maurice Edwards for left-sided breast cancer. Patient underwent lumpectomy which revealed a 0.8 cm estrogen receptor positive HER-2 not amplified breast carcinoma, pT1b, pN0. Patient received radiation therapy. Subsequently she was started on anti-hormonal therapy however she had significant side effects. She was between different anti-hormonal therapy including tamoxifen however she continued to have severe side effects and subsequently decided to discontinue any anti-hormonal therapy. She discontinued anti-hormonal therapy in May 2016. Interim History:  Patient is returning for follow-up visit for her MGUS. Recently she had blood work with her PCP which showed slightly worsening of her kappa free light chain. She denies any new bone pain or back pain. She is getting regular mammogram for her breast cancer follow-up. She denies any new lumps, mass, skin changes or nipple discharge. During this visit patient's allergy, social, medical, surgical history and medications were reviewed and updated.     Past Medical History:   Past Medical History:   Diagnosis Date    [facility] ([facility]     2/15 s/p XRT & Dr Arian Mccabe following    COPD (chronic obstructive pulmonary disease) ([facility] 03/04 mild obstructive defect on PFT's    Depression     Dyslipidemia     Hyperplastic colon polyp     Colonoscopy 2/16/15 with repeat recommended 2/16/2020    IFG (impaired fasting glucose) 5/9/2017    Insomnia     MGUS (monoclonal gammopathy of unknown significance)     0.64 grams/decilier IgG lambda, 04/12. 1.) UPUP negative 04/12. Stable July 2014    Osteoarthritis     particularly in the hands    Osteopenia     DEXA, 01/11/04, T -1.7 spine, T -0.2  hip 1.) T -1.6 spine, T -0.2 hip, 05/07 2.) Repeat DEXA scan, 10/09, with -1.3 spine, T -0.1 hip. 3.) DEXA, 04/12, T -1.4 spine, T -0.0 hip.  4.) Treated with Fosamax x 8 yrs, discontinued 01/12 repeat DEXA 10/14 FRAX 2.4% 10 year risk at hip    Peripheral neuropathy     with mild to moderate sensorimotor peripheral polyneuropathy noted on EMG, 11/11    Vitamin D deficiency        Past Surgical History:     Past Surgical History:   Procedure Laterality Date    APPENDECTOMY      BACK SURGERY  10/11/2019    BREAST BIOPSY Left 11-12-14    US guided brest bx - benign    BREAST LUMPECTOMY Left 02/16/2015    with needle placement- MUCINOUS CARCINOMA- shw    COLONOSCOPY  02/28/03    normal-reed    COLONOSCOPY  10/30/98    normal-reed    COLONOSCOPY  2008    normal, family hx colon cancer- al-jadda    COLONOSCOPY  2/2015    6 cm polyp-benign, repeat 5yrs- dennis    COLONOSCOPY N/A 11/9/2020    diverticulosis and hyperplastic polyp x1; Dr[facility] Dora Hilton [facility] [address] Right 1990's, [facility]     HYSTERECTOMY      WITH BILATERAL SALPINGO OOPHORECTOMY    KNEE ARTHROSCOPY  2011    RIGHT KNEE    LASIK Bilateral     LUMBAR SPINE SURGERY Right 6/21/2019    Right L4 L5 TRANSFORAMINAL performed by Jen Mcdonald MD at [id number] [place] [address] Dr Arredondo 7/23/2019    Right L4 & L5 TRANSFORAMINAL performed by Jen Mcdonald MD at [address] [place] [address] Left [id number]    left sentinal node biopsy    TUBAL LIGATION  08/74 Patient Family Social History:     Social History     Social History Narrative    She works [employer] on [employer]. 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 She has 2 adult sons, 4 grandkids. Current Medications:     Current Outpatient Medications   Medication Sig Dispense Refill    ibuprofen (ADVIL;MOTRIN) 200 MG tablet Take 200 mg by mouth every 6 hours as needed for Pain      Calcium Citrate-Vitamin D (CALCIUM CITRATE + D PO) Take 1 tablet by mouth daily      zaleplon (SONATA) 10 MG capsule Take 1 capsule by mouth nightly as needed (sleep). 90 capsule 1    escitalopram (LEXAPRO) 10 MG tablet Take 1 tablet by mouth daily 90 tablet 3    diclofenac (VOLTAREN) 50 MG EC tablet Take 1 tablet by mouth daily 90 tablet 3    diclofenac sodium (VOLTAREN) 1 % GEL Apply 2 g topically daily as needed for Pain (alternates with oral diclofenac) 3 Tube 3    Handicap Placard MISC by Does not apply route The disability is expected to last 9/5/2024 1 each 0    acetaminophen (TYLENOL) 500 MG tablet Take 1,500 mg by mouth 2 times daily Indications: pt states she is taking abouot 3 to 4 500mg capsule BID      magnesium (MAGNESIUM-OXIDE) 250 MG TABS tablet Take 250 mg by mouth daily      aspirin 81 MG tablet Take 81 mg by mouth daily      B Complex Vitamins (VITAMIN B COMPLEX PO) Take by mouth daily      BLACK COHOSH HOT FLASH RELIEF PO Take 1-2 tablets by mouth daily as needed       No current facility-administered medications for this visit. Allergies:   Crestor [rosuvastatin], Cymbalta [duloxetine hcl], Influenza vaccines, Lipitor, Meloxicam, Pravastatin, Prednisone, Red yeast rice [monascus purpureus went yeast], Zetia [ezetimibe], Zocor [simvastatin], Floxin [ofloxacin], and Gabapentin    Review of Systems:    Constitutional: No fever or chills.  No night sweats, no weight loss If the blood work is stable I will see her in 1 year with repeat MGUS panel   I had a detailed discussion with the patient and personally went over the results of her relevant clinical data. Patient's questions were sought and answered to her satisfaction and she agreed to proceed with the plan      Travis Rodriguez MD  Hematologist/Medical Oncologist    On this date 1/11/21  I have spent 40 minutes reviewing previous notes, test results and face to face with the patient discussing the diagnosis and importance of compliance with the treatment plan. Greater than 50% of that time was spent face-to-face with the patient in counseling and coordinating her care. This note is created with the assistance of a speech recognition program.  While intending to generate a document that actually reflects the content of the visit, the document can still have some errors including those of syntax and sound a like substitutions which may escape proof reading. It such instances, actual meaning can be extrapolated by contextual diversion.

## 2021-01-12 LAB
FREE KAPPA/LAMBDA RATIO: 0.25 (ref 0.26–1.65)
IGA: 114 MG/DL (ref 70–400)
IGG: 1504 MG/DL (ref 700–1600)
IGM: 123 MG/DL (ref 40–230)
KAPPA FREE LIGHT CHAINS QNT: 1.34 MG/DL (ref 0.37–1.94)
LAMBDA FREE LIGHT CHAINS QNT: 5.36 MG/DL (ref 0.57–2.63)

## 2021-01-13 LAB
ALBUMIN (CALCULATED): 4.7 G/DL (ref 3.2–5.2)
ALBUMIN PERCENT: 63 % (ref 45–65)
ALPHA 1 PERCENT: 2 % (ref 3–6)
ALPHA 2 PERCENT: 10 % (ref 6–13)
ALPHA-1-GLOBULIN: 0.1 G/DL (ref 0.1–0.4)
ALPHA-2-GLOBULIN: 0.7 G/DL (ref 0.5–0.9)
BETA GLOBULIN: 0.7 G/DL (ref 0.5–1.1)
BETA PERCENT: 9 % (ref 11–19)
GAMMA GLOBULIN %: 17 % (ref 9–20)
GAMMA GLOBULIN: 1.3 G/DL (ref 0.5–1.5)
PATHOLOGIST: ABNORMAL
PATHOLOGIST: NORMAL
PROTEIN ELECTROPHORESIS, SERUM: ABNORMAL
SERUM IFX INTERP: NORMAL
TOTAL PROT. SUM,%: 101 % (ref 98–102)
TOTAL PROT. SUM: 7.5 G/DL (ref 6.3–8.2)
TOTAL PROTEIN: 7.5 G/DL (ref 6.4–8.3)

## 2021-03-23 ENCOUNTER — OFFICE VISIT (OUTPATIENT)
Dept: INTERNAL MEDICINE | Age: 76
End: 2021-03-23
Payer: MEDICARE

## 2021-03-23 VITALS
DIASTOLIC BLOOD PRESSURE: 70 MMHG | HEIGHT: 63 IN | OXYGEN SATURATION: 96 % | BODY MASS INDEX: 36.64 KG/M2 | RESPIRATION RATE: 16 BRPM | SYSTOLIC BLOOD PRESSURE: 118 MMHG | TEMPERATURE: 97 F | WEIGHT: 206.8 LBS | HEART RATE: 60 BPM

## 2021-03-23 DIAGNOSIS — M85.80 OSTEOPENIA, UNSPECIFIED LOCATION: ICD-10-CM

## 2021-03-23 DIAGNOSIS — G47.9 SLEEP DISORDER: ICD-10-CM

## 2021-03-23 DIAGNOSIS — D47.2 MGUS (MONOCLONAL GAMMOPATHY OF UNKNOWN SIGNIFICANCE): ICD-10-CM

## 2021-03-23 DIAGNOSIS — E66.9 OBESITY WITHOUT SERIOUS COMORBIDITY, UNSPECIFIED CLASSIFICATION, UNSPECIFIED OBESITY TYPE: ICD-10-CM

## 2021-03-23 DIAGNOSIS — E78.5 DYSLIPIDEMIA: ICD-10-CM

## 2021-03-23 DIAGNOSIS — M47.816 LUMBAR SPONDYLOSIS: ICD-10-CM

## 2021-03-23 DIAGNOSIS — M19.041 PRIMARY OSTEOARTHRITIS OF BOTH HANDS: ICD-10-CM

## 2021-03-23 DIAGNOSIS — J43.1 PANLOBULAR EMPHYSEMA (HCC): ICD-10-CM

## 2021-03-23 DIAGNOSIS — E55.9 VITAMIN D DEFICIENCY: ICD-10-CM

## 2021-03-23 DIAGNOSIS — Z85.3 HISTORY OF BREAST CANCER: ICD-10-CM

## 2021-03-23 DIAGNOSIS — Z11.59 ENCOUNTER FOR HEPATITIS C SCREENING TEST FOR LOW RISK PATIENT: ICD-10-CM

## 2021-03-23 DIAGNOSIS — G62.9 PERIPHERAL POLYNEUROPATHY: ICD-10-CM

## 2021-03-23 DIAGNOSIS — F32.9 CHRONIC MAJOR DEPRESSIVE DISORDER: ICD-10-CM

## 2021-03-23 DIAGNOSIS — R73.01 IFG (IMPAIRED FASTING GLUCOSE): ICD-10-CM

## 2021-03-23 DIAGNOSIS — M19.042 PRIMARY OSTEOARTHRITIS OF BOTH HANDS: ICD-10-CM

## 2021-03-23 DIAGNOSIS — J30.2 SEASONAL ALLERGIES: ICD-10-CM

## 2021-03-23 DIAGNOSIS — Z00.00 ROUTINE GENERAL MEDICAL EXAMINATION AT A HEALTH CARE FACILITY: Primary | ICD-10-CM

## 2021-03-23 PROCEDURE — 99214 OFFICE O/P EST MOD 30 MIN: CPT | Performed by: NURSE PRACTITIONER

## 2021-03-23 PROCEDURE — G0439 PPPS, SUBSEQ VISIT: HCPCS | Performed by: NURSE PRACTITIONER

## 2021-03-23 RX ORDER — ZALEPLON 10 MG/1
10 CAPSULE ORAL NIGHTLY PRN
Qty: 90 CAPSULE | Refills: 1 | Status: SHIPPED | OUTPATIENT
Start: 2021-03-23 | End: 2021-10-26 | Stop reason: SDUPTHER

## 2021-03-23 RX ORDER — FLUTICASONE PROPIONATE 50 MCG
1 SPRAY, SUSPENSION (ML) NASAL DAILY
Qty: 1 BOTTLE | Refills: 3 | Status: SHIPPED | OUTPATIENT
Start: 2021-03-23 | End: 2022-10-18 | Stop reason: SDUPTHER

## 2021-03-23 RX ORDER — ESCITALOPRAM OXALATE 10 MG/1
10 TABLET ORAL DAILY
Qty: 90 TABLET | Refills: 3 | Status: SHIPPED | OUTPATIENT
Start: 2021-03-23 | End: 2021-10-25 | Stop reason: SDUPTHER

## 2021-03-23 SDOH — HEALTH STABILITY: MENTAL HEALTH: HOW MANY STANDARD DRINKS CONTAINING ALCOHOL DO YOU HAVE ON A TYPICAL DAY?: 1 OR 2

## 2021-03-23 ASSESSMENT — ENCOUNTER SYMPTOMS
EYE PAIN: 0
WHEEZING: 0
CONSTIPATION: 0
CHEST TIGHTNESS: 0
FACIAL SWELLING: 0
ABDOMINAL PAIN: 0
COLOR CHANGE: 0
SORE THROAT: 0
SHORTNESS OF BREATH: 0
DIARRHEA: 0
RHINORRHEA: 1
VOMITING: 0
NAUSEA: 0
COUGH: 1
SINUS PRESSURE: 1
TROUBLE SWALLOWING: 0
BLOOD IN STOOL: 0

## 2021-03-23 ASSESSMENT — LIFESTYLE VARIABLES
HOW OFTEN DO YOU HAVE A DRINK CONTAINING ALCOHOL: 1
HOW MANY STANDARD DRINKS CONTAINING ALCOHOL DO YOU HAVE ON A TYPICAL DAY: 0
HOW OFTEN DO YOU HAVE SIX OR MORE DRINKS ON ONE OCCASION: 0

## 2021-03-23 NOTE — PROGRESS NOTES
her gardens ready and will be more active. States she is also working out in her flower beds. Allergies   Allergen Reactions    Crestor [Rosuvastatin]      MUSCLE PAIN/ACHES      Cymbalta [Duloxetine Hcl]      Disorientation, excessive sleepiness    Influenza Vaccines Other (See Comments)     Causes problems with eating eggs    Lipitor      MUSCLE PAIN/ACHES      Meloxicam Other (See Comments)     constipation    Pravastatin      MUSCLE PAIN/ACHES    Prednisone      Lost eyelashes       Red Yeast Rice [Monascus Purpureus Went Yeast]      POORLY TOLERATED    Zetia [Ezetimibe]      CONSTIPATION      Zocor [Simvastatin]      MUSCLE PAIN/ACHES    Floxin [Ofloxacin] Nausea And Vomiting    Gabapentin Rash       Past Medical History:   Diagnosis Date    Breast CA ([facility]     2/15 s/p XRT & Dr Nina Canavan following    COPD (chronic obstructive pulmonary disease) ([facility] 03/04    mild obstructive defect on PFT's    Depression     Dyslipidemia     Hyperplastic colon polyp     Colonoscopy 2/16/15 with repeat recommended 2/16/2020    IFG (impaired fasting glucose) 5/9/2017    Insomnia     MGUS (monoclonal gammopathy of unknown significance)     0.64 grams/decilier IgG lambda, 04/12. 1.) UPUP negative 04/12. Stable July 2014    Osteoarthritis     particularly in the hands    Osteopenia     DEXA, 01/11/04, T -1.7 spine, T -0.2  hip 1.) T -1.6 spine, T -0.2 hip, 05/07 2.) Repeat DEXA scan, 10/09, with -1.3 spine, T -0.1 hip. 3.) DEXA, 04/12, T -1.4 spine, T -0.0 hip.  4.) Treated with Fosamax x 8 yrs, discontinued 01/12 repeat DEXA 10/14 FRAX 2.4% 10 year risk at hip    Peripheral neuropathy     with mild to moderate sensorimotor peripheral polyneuropathy noted on EMG, 11/11    Vitamin D deficiency        Past Surgical History:   Procedure Laterality Date    APPENDECTOMY      BACK SURGERY  10/11/2019    BREAST BIOPSY Left 11-12-14    US guided brest bx - benign    BREAST LUMPECTOMY Left 02/16/2015    with needle placement- MUCINOUS CARCINOMA- shw    COLONOSCOPY  02/28/03    normal-reed    COLONOSCOPY  10/30/98    normal-reed    COLONOSCOPY  2008    normal, family hx colon cancer- al-jadda    COLONOSCOPY  2/2015    6 cm polyp-benign, repeat 5yrs- dennis    COLONOSCOPY N/A 11/9/2020    diverticulosis and hyperplastic polyp x1; Dr. Isiah Samaniego at [address] Right 1990's, 2002    [place]     HYSTERECTOMY      WITH BILATERAL SALPINGO OOPHORECTOMY    KNEE ARTHROSCOPY  2011    RIGHT KNEE    LASIK Bilateral     LUMBAR SPINE SURGERY Right 6/21/2019    Right L4 L5 TRANSFORAMINAL performed by Hans Meade MD at [id number] [place] [address] Dr Arredondo 7/23/2019    Right L4 & L5 TRANSFORAMINAL performed by Hans Meade MD at [address] [facility] Left [facility]    left sentinal node biopsy    TUBAL LIGATION  08/74       Current Outpatient Medications on File Prior to Visit   Medication Sig Dispense Refill    ibuprofen (ADVIL;MOTRIN) 200 MG tablet Take 200 mg by mouth every 6 hours as needed for Pain      Calcium Citrate-Vitamin D (CALCIUM CITRATE + D PO) Take 1 tablet by mouth daily      diclofenac sodium (VOLTAREN) 1 % GEL Apply 2 g topically daily as needed for Pain (alternates with oral diclofenac) 3 Tube 3    acetaminophen (TYLENOL) 500 MG tablet Take 1,500 mg by mouth 2 times daily Indications: pt states she is taking abouot 3 to 4 500mg capsule BID      magnesium (MAGNESIUM-OXIDE) 250 MG TABS tablet Take 250 mg by mouth daily      aspirin 81 MG tablet Take 81 mg by mouth daily      B Complex Vitamins (VITAMIN B COMPLEX PO) Take by mouth daily      BLACK COHOSH HOT FLASH RELIEF PO Take 1-2 tablets by mouth daily as needed      Handicap Placard MISC by Does not apply route The disability is expected to last 9/5/2024 1 each 0     No current facility-administered medications on file prior to visit. Social History     Socioeconomic History    Marital status:   Spouse name: Not on file    Number of children: 2    Years of education: Not on file    Highest education level: Not on file   Occupational History    Not on file   Social Needs    Financial resource strain: Not on file    Food insecurity     Worry: Not on file     Inability: Not on file    Transportation needs     Medical: Not on file     Non-medical: Not on file   Tobacco Use    Smoking status: Former Smoker     Packs/day: 0.50     Years: 10.00     Pack years: 5.00     Quit date: 2014     Years since quittin.8    Smokeless tobacco: Never Used    Tobacco comment: quit 10/11 - smokes some days   Substance and Sexual Activity    Alcohol use: Yes     Frequency: Monthly or less     Drinks per session: 1 or 2     Binge frequency: Never     Comment: 1 drink a week    Drug use: No    Sexual activity: Not on file   Lifestyle    Physical activity     Days per week: Not on file     Minutes per session: Not on file    Stress: Not on file   Relationships    Social connections     Talks on phone: Not on file     Gets together: Not on file     Attends Anabaptist service: Not on file     Active member of club or organization: Not on file     Attends meetings of clubs or organizations: Not on file     Relationship status: Not on file    Intimate partner violence     Fear of current or ex partner: Not on file     Emotionally abused: Not on file     Physically abused: Not on file     Forced sexual activity: Not on file   Other Topics Concern    Not on file   Social History Narrative    She works ar [employer] on [employer]. 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 She has 2 adult sons, 4 grandkids. Review of Systems   Constitutional: Negative for activity change, appetite change, chills, fatigue, fever and unexpected weight change. HENT: Positive for congestion, postnasal drip, rhinorrhea and sinus pressure.  Negative for dental problem, ear discharge, ear pain (Positive ear fullness), facial swelling, hearing loss, sore throat and trouble swallowing. Eyes: Negative for pain and visual disturbance. Respiratory: Positive for cough (Occasional nonproductive due to postnasal drip). Negative for chest tightness, shortness of breath and wheezing. Cardiovascular: Negative for chest pain, palpitations and leg swelling. Gastrointestinal: Negative for abdominal pain, blood in stool, constipation, diarrhea, nausea and vomiting. Endocrine: Negative for cold intolerance, heat intolerance and polyuria. Genitourinary: Negative for difficulty urinating. Musculoskeletal: Negative for arthralgias, gait problem, myalgias, neck pain and neck stiffness. Skin: Negative for color change, rash and wound. Neurological: Positive for headaches. Negative for dizziness, tremors, seizures, weakness, light-headedness and numbness. Psychiatric/Behavioral: Negative for confusion and hallucinations. The patient is not nervous/anxious. Physical Exam  Vitals signs and nursing note reviewed. Constitutional:       General: She is not in acute distress. Appearance: Normal appearance. She is well-developed. She is not diaphoretic. HENT:      Head: Normocephalic and atraumatic. Right Ear: External ear normal. No decreased hearing noted. No drainage. There is no impacted cerumen. No foreign body. No mastoid tenderness. No PE tube. Tympanic membrane is bulging. Left Ear: External ear normal. No decreased hearing noted. No drainage. There is no impacted cerumen. No foreign body. No mastoid tenderness. No PE tube. Tympanic membrane is bulging. Nose: Congestion and rhinorrhea present. Eyes:      General:         Right eye: No discharge. Left eye: No discharge. Neck:      Trachea: No tracheal deviation. Cardiovascular:      Rate and Rhythm: Normal rate and regular rhythm. Pulses: Normal pulses.       Heart sounds: Normal heart sounds. No murmur. No friction rub. No gallop. Pulmonary:      Effort: Pulmonary effort is normal. No respiratory distress. Breath sounds: Normal breath sounds. No stridor. No wheezing, rhonchi or rales. Chest:      Chest wall: No tenderness. Abdominal:      General: Bowel sounds are normal. There is no distension. Palpations: Abdomen is soft. Tenderness: There is no abdominal tenderness. Musculoskeletal:         General: No swelling. Skin:     General: Skin is warm and dry. Capillary Refill: Capillary refill takes less than 2 seconds. Coloration: Skin is not pale. Findings: No rash. Neurological:      General: No focal deficit present. Mental Status: She is alert. Mental status is at baseline. Cranial Nerves: No cranial nerve deficit. Sensory: No sensory deficit. Coordination: Coordination normal.   Psychiatric:         Mood and Affect: Mood normal.         Behavior: Behavior normal.          Vitals:    03/23/21 1342   BP: 118/70   Site: Right Upper Arm   Position: Sitting   Cuff Size: Large Adult   Pulse: 60   Resp: 16   Temp: 97 °F (36.1 °C)   TempSrc: Temporal   SpO2: 96%   Weight: 206 lb 12.8 oz (93.8 kg)   Height: 5' 3\" (1.6 m)       Assessment:  1. Routine general medical examination at a health care facility    2. Sleep disorder  Stable on Sonata  - zaleplon (SONATA) 10 MG capsule; Take 1 capsule by mouth nightly as needed (sleep). Dispense: 90 capsule; Refill: 1    3. Chronic major depressive disorder  Stable on Lexapro. - escitalopram (LEXAPRO) 10 MG tablet; Take 1 tablet by mouth daily  Dispense: 90 tablet; Refill: 3    4. History of breast cancer  Not on any antihormonal therapy due to significant side effects with multiple agents. She has not had any since May 2016. Gets yearly mammograms. Has seen oncology here this year. Ongoing follow-ups in place yearly    5.  Dyslipidemia  The 10-year ASCVD risk score (Corby Mejía Ortiz Cortes, et al., 2013) is: 15.7%    Values used to calculate the score:      Age: 67 years      Sex: Female      Is Non- : No      Diabetic: No      Tobacco smoker: No      Systolic Blood Pressure: 830 mmHg      Is BP treated: No      HDL Cholesterol: 59 mg/dL      Total Cholesterol: 266 mg/dL  Declines statin drug. Increased cardiovascular risk. Continue to work on diet remain active  - Lipid Panel; Future    6. Primary osteoarthritis of both hands  Stable with intermittent oral diclofenac tabs. Tries to use the topical gel more frequently to avoid ingestion of frequent NSAIDs  - diclofenac (VOLTAREN) 50 MG EC tablet; Take 1 tablet by mouth daily  Dispense: 90 tablet; Refill: 3  - diclofenac sodium (VOLTAREN) 1 % GEL; Apply 2 g topically daily as needed for Pain (alternates with oral diclofenac)  Dispense: 300 g; Refill: 3    7. Osteopenia, unspecified location  Continues on calcium and vitamin D. She has been off of Fosamax which she declines to restart. 8. MGUS (monoclonal gammopathy of unknown significance)  Ongoing follow-up with hematology in place. Serial lab follow-ups  - CBC Auto Differential; Future  - Protein Electrophoresis, Serum; Future  - Kappa/Lambda Quant Free Light Chains Serum; Future  - Immunofixation Serum Profile; Future  - Lactate Dehydrogenase; Future    9. Peripheral polyneuropathy  Stable at present    10. Vitamin D deficiency  Stable on supplemental vitamin D, serial lab follow-ups  - Vitamin D 25 Hydroxy; Future    11. IFG (impaired fasting glucose)  Continue to work on diet remain active, serial lab follow-up  - Hemoglobin A1C; Future  - Comprehensive Metabolic Panel; Future    12. Panlobular emphysema (HCC)  Stable. Continue to monitor    13. Lumbar spondylosis  Status post lumbar fusion. Back pain significantly improved. Sees surgeon on a as needed basis    14.  Obesity without serious comorbidity, unspecified classification, unspecified obesity type

## 2021-03-23 NOTE — PATIENT INSTRUCTIONS
Personalized Preventive Plan for Yared Giordano - 3/23/2021  Medicare offers a range of preventive health benefits. Some of the tests and screenings are paid in full while other may be subject to a deductible, co-insurance, and/or copay. Some of these benefits include a comprehensive review of your medical history including lifestyle, illnesses that may run in your family, and various assessments and screenings as appropriate. After reviewing your medical record and screening and assessments performed today your provider may have ordered immunizations, labs, imaging, and/or referrals for you. A list of these orders (if applicable) as well as your Preventive Care list are included within your After Visit Summary for your review. Other Preventive Recommendations:    · A preventive eye exam performed by an eye specialist is recommended every 1-2 years to screen for glaucoma; cataracts, macular degeneration, and other eye disorders. · A preventive dental visit is recommended every 6 months. · Try to get at least 150 minutes of exercise per week or 10,000 steps per day on a pedometer . · Order or download the FREE \"Exercise & Physical Activity: Your Everyday Guide\" from [employer]. Call 0-167.875.6128 or search [employer] online. · You need 4822-7344 mg of calcium and 3694-8594 IU of vitamin D per day. It is possible to meet your calcium requirement with diet alone, but a vitamin D supplement is usually necessary to meet this goal.  · When exposed to the sun, use a sunscreen that protects against both UVA and UVB radiation with an SPF of 30 or greater. Reapply every 2 to 3 hours or after sweating, drying off with a towel, or swimming. · Always wear a seat belt when traveling in a car. Always wear a helmet when riding a bicycle or motorcycle.       Start Zyrtec and Flonase daily   Follow up in office if symptoms persist

## 2021-03-23 NOTE — PROGRESS NOTES
Medicare Annual Wellness Visit  Name: Jesús Agarwal Date: 3/23/2021   MRN: [id number] Sex: Female   Age: 67 y.o. Ethnicity: Non-/Non    : 1945 Race: Miriam Krishnan is here for Medicare AWV and 6 Month Follow-Up    Screenings for behavioral, psychosocial and functional/safety risks, and cognitive dysfunction are all negative except as indicated below. These results, as well as other patient data from the [address] form, are documented in Flowsheets linked to this Encounter. Allergies   Allergen Reactions    Crestor [Rosuvastatin]      MUSCLE PAIN/ACHES      Cymbalta [Duloxetine Hcl]      Disorientation, excessive sleepiness    Influenza Vaccines Other (See Comments)     Causes problems with eating eggs    Lipitor      MUSCLE PAIN/ACHES      Meloxicam Other (See Comments)     constipation    Pravastatin      MUSCLE PAIN/ACHES    Prednisone      Lost eyelashes       Red Yeast Rice [Monascus Purpureus Went Yeast]      POORLY TOLERATED    Zetia [Ezetimibe]      CONSTIPATION      Zocor [Simvastatin]      MUSCLE PAIN/ACHES    Floxin [Ofloxacin] Nausea And Vomiting    Gabapentin Rash       Prior to Visit Medications    Medication Sig Taking? Authorizing Provider   ibuprofen (ADVIL;MOTRIN) 200 MG tablet Take 200 mg by mouth every 6 hours as needed for Pain Yes Historical Provider, MD   Calcium Citrate-Vitamin D (CALCIUM CITRATE + D PO) Take 1 tablet by mouth daily Yes Historical Provider, MD hamiltonplon (SONATA) 10 MG capsule Take 1 capsule by mouth nightly as needed (sleep).  Yes NICOLASA Israel CNP   escitalopram (LEXAPRO) 10 MG tablet Take 1 tablet by mouth daily Yes NICOLASA Israel CNP   diclofenac (VOLTAREN) 50 MG EC tablet Take 1 tablet by mouth daily Yes NICOLASA Israel CNP   diclofenac sodium (VOLTAREN) 1 % GEL Apply 2 g topically daily as needed for Pain (alternates with oral diclofenac) Yes NICOLASA Israel CNP acetaminophen (TYLENOL) 500 MG tablet Take 1,500 mg by mouth 2 times daily Indications: pt states she is taking abouot 3 to 4 500mg capsule BID Yes Historical Provider, MD   magnesium (MAGNESIUM-OXIDE) 250 MG TABS tablet Take 250 mg by mouth daily Yes Historical Provider, MD   aspirin 81 MG tablet Take 81 mg by mouth daily Yes Historical Provider, MD   B Complex Vitamins (VITAMIN B COMPLEX PO) Take by mouth daily Yes Historical Provider, MD   BLACK COHOSH HOT FLASH RELIEF PO Take 1-2 tablets by mouth daily as needed Yes Historical Provider, MD   Handicap Placard [address] [facility] by Does not apply route The disability is expected to last 9/5/2024  Levoboone Cormier, APRN - CNP       Past Medical History:   Diagnosis Date    Breast CA ([facility]     2/15 s/p XRT & Dr Longoria Patches following    COPD (chronic obstructive pulmonary disease) ([facility] 03/04    mild obstructive defect on PFT's    Depression     Dyslipidemia     Hyperplastic colon polyp     Colonoscopy 2/16/15 with repeat recommended 2/16/2020    IFG (impaired fasting glucose) 5/9/2017    Insomnia     MGUS (monoclonal gammopathy of unknown significance)     0.64 grams/decilier IgG lambda, 04/12. 1.) UPUP negative 04/12. Stable July 2014    Osteoarthritis     particularly in the hands    Osteopenia     DEXA, 01/11/04, T -1.7 spine, T -0.2  hip 1.) T -1.6 spine, T -0.2 hip, 05/07 2.) Repeat DEXA scan, 10/09, with -1.3 spine, T -0.1 hip. 3.) DEXA, 04/12, T -1.4 spine, T -0.0 hip.  4.) Treated with Fosamax x 8 yrs, discontinued 01/12 repeat DEXA 10/14 FRAX 2.4% 10 year risk at hip    Peripheral neuropathy     with mild to moderate sensorimotor peripheral polyneuropathy noted on EMG, 11/11    Vitamin D deficiency        Past Surgical History:   Procedure Laterality Date    APPENDECTOMY      BACK SURGERY  10/11/2019    BREAST BIOPSY Left 11-12-14    US guided brest bx - benign    BREAST LUMPECTOMY Left 02/16/2015    with needle placement- MUCINOUS CARCINOMA- shw    COLONOSCOPY  02/28/03    normal-reed    COLONOSCOPY  10/30/98    normal-reed    COLONOSCOPY  2008    normal, family hx colon cancer- al-jadda    COLONOSCOPY  2/2015    6 cm polyp-benign, repeat 5yrs- dennis    COLONOSCOPY N/A 11/9/2020    diverticulosis and hyperplastic polyp x1; Dr. Monteiro Call at [address] [facility] Right 1990's, 2002    [place]     HYSTERECTOMY      WITH BILATERAL SALPINGO OOPHORECTOMY    KNEE ARTHROSCOPY  2011    RIGHT KNEE    LASIK Bilateral     LUMBAR SPINE SURGERY Right 6/21/2019    Right L4 L5 TRANSFORAMINAL performed by Lopez Lambert MD at [place] [address] Right 7/23/2019    Right L4 & L5 TRANSFORAMINAL performed by Lopez Lambert MD at [address] [place] [address] Left [facility]    left sentinal node biopsy    TUBAL LIGATION  08/74       Family History   Problem Relation Age of Onset    Cancer Mother         COLON CANCER    Heart Attack Father         LATE 70'S    Diabetes Neg Hx     Breast Cancer Neg Hx     Cataracts Neg Hx     Glaucoma Neg Hx        CareTeam (Including outside providers/suppliers regularly involved in providing care):   Patient Care Team:  NICOLASA Pulido CNP as PCP - General (Nurse Practitioner)  NICOLASA Pulido CNP as PCP - [facility] Empaneled Provider    Wt Readings from Last 3 Encounters:   03/23/21 206 lb 12.8 oz (93.8 kg)   01/11/21 203 lb 9.6 oz (92.4 kg)   11/09/20 196 lb (88.9 kg)     Vitals:    03/23/21 1342   BP: 118/70   Site: Right Upper Arm   Position: Sitting   Cuff Size: Large Adult   Pulse: 60   Resp: 16   Temp: 97 °F (36.1 °C)   TempSrc: Temporal   SpO2: 96%   Weight: 206 lb 12.8 oz (93.8 kg)   Height: 5' 3\" (1.6 m)     Body mass index is 36.63 kg/m². Patient's complete Health Risk Assessment and screening values have been reviewed and are found in Flowsheets. The following problems were reviewed today and where indicated follow up appointments were made and/or referrals ordered. Positive Risk Factor Screenings with Interventions:       Depression:  Depression Unable to Assess: Pt refuses    Severity:1-4 = minimal depression, 5-9 = mild depression, 10-14 = moderate depression, 15-19 = moderately severe depression, 20-27 = severe depression      General Health and ACP:  General  In general, how would you say your health is?: Good  In the past 7 days, have you experienced any of the following?  New or Increased Pain, New or Increased Fatigue, Loneliness, Social Isolation, Stress or Anger?: (!) Stress  Do you get the social and emotional support that you need?: (declines to answer)  Do you have a Living Will?: Yes  Advance Directives     Power of  Living Will ACP-Advance Directive ACP-Power of     Not on File Not on File Not on File Not on File      General Health Risk Interventions:  · Stress: patient declines any further evaluation/treatment for this issue    Health Habits/Nutrition:  Health Habits/Nutrition  Do you exercise for at least 20 minutes 2-3 times per week?: (!) No  Have you lost any weight without trying in the past 3 months?: No  Do you eat only one meal per day?: No  Have you seen the dentist within the past year?: Yes  Body mass index: (!) 36.63  Health Habits/Nutrition Interventions:  · Inadequate physical activity:  patient is not ready to increase his/her physical activity level at this time     Safety:  Safety  Do you have working smoke detectors?: Yes  Have all throw rugs been removed or fastened?: (!) No  Do you have non-slip mats or surfaces in all bathtubs/showers?: Yes  Do all of your stairways have a railing or banister?: (!) No  Are your doorways, halls and stairs free of clutter?: Yes  Do you always fasten your seatbelt when you are in a car?: (declines)  Safety Interventions:  · Home safety tips provided     Personalized Preventive Plan   Current Health Maintenance Status  Immunization History   Administered Date(s) Administered    Influenza, Radha Quiroz Recombinant, IM PF (Flublok 18 yrs and older) 10/05/2020    Pneumococcal Conjugate 13-valent ([id number]) 05/13/2015    Pneumococcal Polysaccharide ([id number]) 05/09/2007, 11/06/2013    Tdap (Boostrix, Adacel) 07/30/2014    Zoster Live (Zostavax) 11/06/2012    Zoster Recombinant (Shingrix) 05/23/2018, 02/13/2019        Health Maintenance   Topic Date Due    Hepatitis C screen  Never done    COVID-19 Vaccine (1) Never done    DTaP/Tdap/Td vaccine (2 - Td) 07/30/2024    Colon cancer screen colonoscopy  11/09/2025    DEXA (modify frequency per FRAX score)  Completed    Shingles Vaccine  Completed    Pneumococcal 65+ years Vaccine  Completed    Hepatitis A vaccine  Aged Out    Hepatitis B vaccine  Aged Out    Hib vaccine  Aged Out    Meningococcal (ACWY) vaccine  Aged Out     Recommendations for [employer] Due: see orders and patient instructions/AVS.  . Recommended screening schedule for the next 5-10 years is provided to the patient in written form: see Patient Instructions/AVS.    Rhina Tabor LPN, 7/18/6115, performed the documented evaluation under the direct supervision of the attending physician.

## 2021-03-25 ENCOUNTER — IMMUNIZATION (OUTPATIENT)
Dept: LAB | Age: 76
End: 2021-03-25
Payer: MEDICARE

## 2021-03-25 PROCEDURE — 91301 COVID-19, MODERNA VACCINE 100MCG/0.5ML DOSE: CPT

## 2021-04-22 ENCOUNTER — IMMUNIZATION (OUTPATIENT)
Dept: LAB | Age: 76
End: 2021-04-22
Payer: MEDICARE

## 2021-04-22 PROCEDURE — 0012A COVID-19, MODERNA VACCINE 100MCG/0.5ML DOSE: CPT

## 2021-10-01 ENCOUNTER — HOSPITAL ENCOUNTER (OUTPATIENT)
Dept: LAB | Age: 76
Discharge: HOME | End: 2021-10-01
Payer: MEDICARE

## 2021-10-01 DIAGNOSIS — D47.2 MGUS (MONOCLONAL GAMMOPATHY OF UNKNOWN SIGNIFICANCE): ICD-10-CM

## 2021-10-01 DIAGNOSIS — E78.5 DYSLIPIDEMIA: ICD-10-CM

## 2021-10-01 DIAGNOSIS — R73.01 IFG (IMPAIRED FASTING GLUCOSE): ICD-10-CM

## 2021-10-01 DIAGNOSIS — Z11.59 ENCOUNTER FOR HEPATITIS C SCREENING TEST FOR LOW RISK PATIENT: ICD-10-CM

## 2021-10-01 DIAGNOSIS — E55.9 VITAMIN D DEFICIENCY: ICD-10-CM

## 2021-10-01 LAB
ABSOLUTE EOS #: 0.2 K/UL (ref 0–0.44)
ABSOLUTE IMMATURE GRANULOCYTE: <0.03 K/UL (ref 0–0.3)
ABSOLUTE LYMPH #: 1.5 K/UL (ref 1.1–3.7)
ABSOLUTE MONO #: 0.69 K/UL (ref 0.1–1.2)
ALBUMIN SERPL-MCNC: 4.2 G/DL (ref 3.5–5.2)
ALBUMIN/GLOBULIN RATIO: 1.2 (ref 1–2.5)
ALP BLD-CCNC: 55 U/L (ref 35–104)
ALT SERPL-CCNC: 15 U/L (ref 5–33)
ANION GAP SERPL CALCULATED.3IONS-SCNC: 11 MMOL/L (ref 9–17)
AST SERPL-CCNC: 19 U/L
BASOPHILS # BLD: 1 % (ref 0–2)
BASOPHILS ABSOLUTE: 0.06 K/UL (ref 0–0.2)
BILIRUB SERPL-MCNC: 0.29 MG/DL (ref 0.3–1.2)
BUN BLDV-MCNC: 29 MG/DL (ref 8–23)
BUN/CREAT BLD: 38 (ref 9–20)
CALCIUM SERPL-MCNC: 10 MG/DL (ref 8.6–10.4)
CHLORIDE BLD-SCNC: 101 MMOL/L (ref 98–107)
CHOLESTEROL/HDL RATIO: 4.1
CHOLESTEROL: 264 MG/DL
CO2: 25 MMOL/L (ref 20–31)
CREAT SERPL-MCNC: 0.76 MG/DL (ref 0.5–0.9)
DIFFERENTIAL TYPE: ABNORMAL
EOSINOPHILS RELATIVE PERCENT: 4 % (ref 1–4)
ESTIMATED AVERAGE GLUCOSE: 123 MG/DL
FREE KAPPA/LAMBDA RATIO: 0.22 (ref 0.26–1.65)
GFR AFRICAN AMERICAN: >60 ML/MIN
GFR NON-AFRICAN AMERICAN: >60 ML/MIN
GFR SERPL CREATININE-BSD FRML MDRD: ABNORMAL ML/MIN/{1.73_M2}
GFR SERPL CREATININE-BSD FRML MDRD: ABNORMAL ML/MIN/{1.73_M2}
GLUCOSE BLD-MCNC: 96 MG/DL (ref 70–99)
HBA1C MFR BLD: 5.9 % (ref 4–6)
HCT VFR BLD CALC: 37.3 % (ref 36.3–47.1)
HDLC SERPL-MCNC: 64 MG/DL
HEMOGLOBIN: 11.8 G/DL (ref 11.9–15.1)
HEPATITIS C ANTIBODY: NONREACTIVE
IMMATURE GRANULOCYTES: 0 %
KAPPA FREE LIGHT CHAINS QNT: 1.2 MG/DL (ref 0.37–1.94)
LACTATE DEHYDROGENASE: 216 U/L (ref 135–214)
LAMBDA FREE LIGHT CHAINS QNT: 5.39 MG/DL (ref 0.57–2.63)
LDL CHOLESTEROL: 166 MG/DL (ref 0–130)
LYMPHOCYTES # BLD: 27 % (ref 24–43)
MCH RBC QN AUTO: 29.6 PG (ref 25.2–33.5)
MCHC RBC AUTO-ENTMCNC: 31.6 G/DL (ref 25.2–33.5)
MCV RBC AUTO: 93.5 FL (ref 82.6–102.9)
MONOCYTES # BLD: 12 % (ref 3–12)
NRBC AUTOMATED: 0 PER 100 WBC
PDW BLD-RTO: 13.2 % (ref 11.8–14.4)
PLATELET # BLD: 258 K/UL (ref 138–453)
PLATELET ESTIMATE: ABNORMAL
PMV BLD AUTO: 10.4 FL (ref 8.1–13.5)
POTASSIUM SERPL-SCNC: 5.1 MMOL/L (ref 3.7–5.3)
RBC # BLD: 3.99 M/UL (ref 3.95–5.11)
RBC # BLD: ABNORMAL 10*6/UL
SEG NEUTROPHILS: 56 % (ref 36–65)
SEGMENTED NEUTROPHILS ABSOLUTE COUNT: 3.16 K/UL (ref 1.5–8.1)
SODIUM BLD-SCNC: 137 MMOL/L (ref 135–144)
TOTAL PROTEIN: 7.6 G/DL (ref 6.4–8.3)
TRIGL SERPL-MCNC: 170 MG/DL
VITAMIN D 25-HYDROXY: 32.7 NG/ML (ref 30–100)
VLDLC SERPL CALC-MCNC: ABNORMAL MG/DL (ref 1–30)
WBC # BLD: 5.6 K/UL (ref 3.5–11.3)
WBC # BLD: ABNORMAL 10*3/UL

## 2021-10-01 PROCEDURE — 85025 COMPLETE CBC W/AUTO DIFF WBC: CPT

## 2021-10-01 PROCEDURE — 86334 IMMUNOFIX E-PHORESIS SERUM: CPT

## 2021-10-01 PROCEDURE — 80061 LIPID PANEL: CPT

## 2021-10-01 PROCEDURE — 84155 ASSAY OF PROTEIN SERUM: CPT

## 2021-10-01 PROCEDURE — 80053 COMPREHEN METABOLIC PANEL: CPT

## 2021-10-01 PROCEDURE — 82306 VITAMIN D 25 HYDROXY: CPT

## 2021-10-01 PROCEDURE — 86803 HEPATITIS C AB TEST: CPT

## 2021-10-01 PROCEDURE — 83615 LACTATE (LD) (LDH) ENZYME: CPT

## 2021-10-01 PROCEDURE — 36415 COLL VENOUS BLD VENIPUNCTURE: CPT

## 2021-10-01 PROCEDURE — 84165 PROTEIN E-PHORESIS SERUM: CPT

## 2021-10-01 PROCEDURE — 83883 ASSAY NEPHELOMETRY NOT SPEC: CPT

## 2021-10-01 PROCEDURE — 83036 HEMOGLOBIN GLYCOSYLATED A1C: CPT

## 2021-10-05 ENCOUNTER — OFFICE VISIT (OUTPATIENT)
Dept: INTERNAL MEDICINE | Age: 76
End: 2021-10-05
Payer: MEDICARE

## 2021-10-05 VITALS
SYSTOLIC BLOOD PRESSURE: 126 MMHG | DIASTOLIC BLOOD PRESSURE: 74 MMHG | HEART RATE: 70 BPM | BODY MASS INDEX: 35.51 KG/M2 | HEIGHT: 63 IN | WEIGHT: 200.4 LBS

## 2021-10-05 DIAGNOSIS — M47.816 LUMBAR SPONDYLOSIS: ICD-10-CM

## 2021-10-05 DIAGNOSIS — F32.9 CHRONIC MAJOR DEPRESSIVE DISORDER: ICD-10-CM

## 2021-10-05 DIAGNOSIS — J43.1 PANLOBULAR EMPHYSEMA (HCC): ICD-10-CM

## 2021-10-05 DIAGNOSIS — E78.5 DYSLIPIDEMIA: ICD-10-CM

## 2021-10-05 DIAGNOSIS — M85.80 OSTEOPENIA, UNSPECIFIED LOCATION: ICD-10-CM

## 2021-10-05 DIAGNOSIS — J30.2 SEASONAL ALLERGIES: ICD-10-CM

## 2021-10-05 DIAGNOSIS — R73.01 IFG (IMPAIRED FASTING GLUCOSE): ICD-10-CM

## 2021-10-05 DIAGNOSIS — Z12.31 ENCOUNTER FOR SCREENING MAMMOGRAM FOR MALIGNANT NEOPLASM OF BREAST: ICD-10-CM

## 2021-10-05 DIAGNOSIS — M19.041 PRIMARY OSTEOARTHRITIS OF BOTH HANDS: ICD-10-CM

## 2021-10-05 DIAGNOSIS — D47.2 MGUS (MONOCLONAL GAMMOPATHY OF UNKNOWN SIGNIFICANCE): ICD-10-CM

## 2021-10-05 DIAGNOSIS — Z85.3 HISTORY OF BREAST CANCER: ICD-10-CM

## 2021-10-05 DIAGNOSIS — E55.9 VITAMIN D DEFICIENCY: ICD-10-CM

## 2021-10-05 DIAGNOSIS — Z12.31 OTHER SCREENING MAMMOGRAM: ICD-10-CM

## 2021-10-05 DIAGNOSIS — G62.9 PERIPHERAL POLYNEUROPATHY: ICD-10-CM

## 2021-10-05 DIAGNOSIS — Z12.31 ENCOUNTER FOR SCREENING MAMMOGRAM FOR BREAST CANCER: ICD-10-CM

## 2021-10-05 DIAGNOSIS — M19.042 PRIMARY OSTEOARTHRITIS OF BOTH HANDS: ICD-10-CM

## 2021-10-05 DIAGNOSIS — G47.9 SLEEP DISORDER: Primary | ICD-10-CM

## 2021-10-05 PROCEDURE — 99214 OFFICE O/P EST MOD 30 MIN: CPT | Performed by: NURSE PRACTITIONER

## 2021-10-05 NOTE — PROGRESS NOTES
10/05/21  Aretha Weinberg  1945      Chief Complaint:   1. Sleep disorder    2. Chronic major depressive disorder    3. History of breast cancer    4. Dyslipidemia    5. Primary osteoarthritis of both hands    6. Osteopenia, unspecified location    7. MGUS (monoclonal gammopathy of unknown significance)    8. Peripheral polyneuropathy    9. Vitamin D deficiency    10. IFG (impaired fasting glucose)    11. Panlobular emphysema ([address]    12. Lumbar spondylosis    13. Seasonal allergies    14. Encounter for screening mammogram for breast cancer    15. Encounter for screening mammogram for malignant neoplasm of breast    16. Other screening mammogram        HPI:    Patient in for 6-month follow-up of above chronic health conditions. She feels she has been doing well. Sleeping well with the Sonata. Mood has been stable on her Lexapro. She is getting gel shots to both of her knees through Ortho. Once cleared through insurance. She does have a history of breast cancer. Her mammogram last December was normal.  Is set up for her yearly mammogram.  We again reviewed her elevated ASCVD risk factor of 19.5%. She continues to decline statin drugs. Aware of the risk. She continues off of the Fosamax. But takes her calcium and vitamin D for osteopenia. Aware of increased fracture risk. Follows with hematology yearly. Routine labs in place. Continues on supplemental vitamin D. No unwarranted side effects. Denies any shortness of breath chest discomfort. Overall she feels she has been doing well.     Allergies   Allergen Reactions    Crestor [Rosuvastatin]      MUSCLE PAIN/ACHES      Cymbalta [Duloxetine Hcl]      Disorientation, excessive sleepiness    Influenza Vaccines Other (See Comments)     Causes problems with eating eggs    Lipitor      MUSCLE PAIN/ACHES      Meloxicam Other (See Comments)     constipation    Pravastatin      MUSCLE PAIN/ACHES    Prednisone      Lost eyelashes       Red Yeast Rice [[place] Rajeev Carline      POORLY TOLERATED    Zetia [Ezetimibe]      CONSTIPATION      Zocor [Simvastatin]      MUSCLE PAIN/ACHES    Floxin [Ofloxacin] Nausea And Vomiting    Gabapentin Rash       Past Medical History:   Diagnosis Date    Breast CA ([facility]     2/15 s/p XRT & Dr Jesse Townsend following    Closed fracture of right foot with routine healing 09/2021    COPD (chronic obstructive pulmonary disease) ([facility] 03/2004    mild obstructive defect on PFT's    Depression     Dyslipidemia     Hyperplastic colon polyp     Colonoscopy 2/16/15 with repeat recommended 2/16/2020    IFG (impaired fasting glucose) 05/09/2017    Insomnia     MGUS (monoclonal gammopathy of unknown significance)     0.64 grams/decilier IgG lambda, 04/12. 1.) UPUP negative 04/12. Stable July 2014    Osteoarthritis     particularly in the hands    Osteopenia     DEXA, 01/11/04, T -1.7 spine, T -0.2  hip 1.) T -1.6 spine, T -0.2 hip, 05/07 2.) Repeat DEXA scan, 10/09, with -1.3 spine, T -0.1 hip. 3.) DEXA, 04/12, T -1.4 spine, T -0.0 hip.  4.) Treated with Fosamax x 8 yrs, discontinued 01/12 repeat DEXA 10/14 FRAX 2.4% 10 year risk at hip    Peripheral neuropathy     with mild to moderate sensorimotor peripheral polyneuropathy noted on EMG, 11/11    Vitamin D deficiency        Past Surgical History:   Procedure Laterality Date    APPENDECTOMY      BACK SURGERY  10/11/2019    BREAST BIOPSY Left 11-12-14    US guided brest bx - benign    BREAST LUMPECTOMY Left 02/16/2015    with needle placement- MUCINOUS CARCINOMA- shw    COLONOSCOPY  02/28/03    normal-reed    COLONOSCOPY  10/30/98    normal-reed    COLONOSCOPY  2008    normal, family hx colon cancer- al-jadda    COLONOSCOPY  2/2015    6 cm polyp-benign, repeat 5yrs- dennis    COLONOSCOPY N/A 11/9/2020    diverticulosis and hyperplastic polyp x1; Dr. Mariposa Downs at [address] Right 1990's, 2002    [place]     HYSTERECTOMY      WITH BILATERAL SALPINGO OOPHORECTOMY    KNEE ARTHROSCOPY  2011    RIGHT KNEE    LASIK Bilateral     LUMBAR SPINE SURGERY Right 6/21/2019    Right L4 L5 TRANSFORAMINAL performed by Renetta Walker MD at [address] [place] [address] Dr Arredondo 7/23/2019    Right L4 & L5 TRANSFORAMINAL performed by Renetta Walker MD at [address] [place] [address] Left [id number]    left sentinal node biopsy    TUBAL LIGATION  08/74       Current Outpatient Medications on File Prior to Visit   Medication Sig Dispense Refill    oxaprozin (DAYPRO) 600 MG tablet Take 600 mg by mouth daily      diclofenac (VOLTAREN) 50 MG EC tablet Take 1 tablet by mouth daily 90 tablet 3    escitalopram (LEXAPRO) 10 MG tablet Take 1 tablet by mouth daily 90 tablet 3    zaleplon (SONATA) 10 MG capsule Take 1 capsule by mouth nightly as needed (sleep). 90 capsule 1    diclofenac sodium (VOLTAREN) 1 % GEL Apply 2 g topically daily as needed for Pain (alternates with oral diclofenac) 300 g 3    fluticasone (FLONASE) 50 MCG/ACT nasal spray 1 spray by Nasal route daily 1 Bottle 3    ibuprofen (ADVIL;MOTRIN) 200 MG tablet Take 200 mg by mouth every 6 hours as needed for Pain      Calcium Citrate-Vitamin D (CALCIUM CITRATE + D PO) Take 1 tablet by mouth daily      acetaminophen (TYLENOL) 500 MG tablet Take 1,500 mg by mouth 2 times daily Indications: pt states she is taking abouot 3 to 4 500mg capsule BID      magnesium (MAGNESIUM-OXIDE) 250 MG TABS tablet Take 250 mg by mouth daily      aspirin 81 MG tablet Take 81 mg by mouth daily      B Complex Vitamins (VITAMIN B COMPLEX PO) Take by mouth daily      BLACK COHOSH HOT FLASH RELIEF PO Take 1-2 tablets by mouth daily as needed      Handicap Placard MISC by Does not apply route The disability is expected to last 9/5/2024 1 each 0     No current facility-administered medications on file prior to visit. Social History     Socioeconomic History    Marital status:       Spouse name: Not on file    Number of children: 2    Years of education: Not on file    Highest education level: Not on file   Occupational History    Not on file   Tobacco Use    Smoking status: Former Smoker     Packs/day: 0.50     Years: 10.00     Pack years: 5.00     Quit date: 2014     Years since quittin.4    Smokeless tobacco: Never Used    Tobacco comment: quit 10/11 - smokes some days   Substance and Sexual Activity    Alcohol use: Yes     Comment: 1 drink a week    Drug use: No    Sexual activity: Not on file   Other Topics Concern    Not on file   Social History Narrative    She works [employer] [address]. 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 She has 2 adult sons, 4 grandkids. Social Determinants of Health     Financial Resource Strain:     Difficulty of Paying Living Expenses:    Food Insecurity:     Worried About Running Out of Food in the Last Year:     [address] in the Last Year:    Transportation Needs:     Lack of Transportation (Medical):  Lack of Transportation (Non-Medical):    Physical Activity:     Days of Exercise per Week:     Minutes of Exercise per Session:    Stress:     Feeling of Stress :    Social Connections:     Frequency of Communication with Friends and Family:     Frequency of Social Gatherings with Friends and Family:     Attends Mu-ism Services:     Active Member of Clubs or Organizations:     Attends Club or Organization Meetings:     Marital Status:    Intimate Partner Violence:     Fear of Current or Ex-Partner:     Emotionally Abused:     Physically Abused:     Sexually Abused:        Review of Systems   Constitutional: Negative for activity change, appetite change, chills, fatigue, fever and unexpected weight change.    HENT: Negative for congestion, dental problem, ear discharge, ear pain, facial swelling, hearing loss, postnasal drip, rhinorrhea, sinus pressure, sore throat and trouble swallowing. Eyes: Negative for pain and visual disturbance. Respiratory: Negative for cough, chest tightness, shortness of breath and wheezing. Cardiovascular: Negative for chest pain, palpitations and leg swelling. Gastrointestinal: Negative for abdominal pain, blood in stool, constipation, diarrhea, nausea and vomiting. Endocrine: Negative for cold intolerance, heat intolerance and polyuria. Genitourinary: Negative for difficulty urinating. Musculoskeletal: Positive for arthralgias. Negative for gait problem, myalgias, neck pain and neck stiffness. Skin: Negative for color change, rash and wound. Neurological: Negative for dizziness, tremors, seizures, weakness, light-headedness, numbness and headaches. Psychiatric/Behavioral: Negative for confusion and hallucinations. The patient is not nervous/anxious. Physical Exam  Vitals and nursing note reviewed. Constitutional:       General: She is not in acute distress. Appearance: Normal appearance. She is well-developed. She is not diaphoretic. HENT:      Head: Normocephalic and atraumatic. Right Ear: External ear normal.      Left Ear: External ear normal.   Eyes:      General:         Right eye: No discharge. Left eye: No discharge. Neck:      Trachea: No tracheal deviation. Cardiovascular:      Rate and Rhythm: Normal rate and regular rhythm. Heart sounds: Normal heart sounds. No murmur heard. No friction rub. No gallop. Pulmonary:      Effort: Pulmonary effort is normal. No respiratory distress. Breath sounds: Normal breath sounds. No stridor. No wheezing, rhonchi or rales. Chest:      Chest wall: No tenderness. Abdominal:      General: Bowel sounds are normal. There is no distension. Palpations: Abdomen is soft. Tenderness: There is no abdominal tenderness. Musculoskeletal:         General: No swelling. Skin:     General: Skin is warm and dry. Capillary Refill: Capillary refill takes less than 2 seconds. Coloration: Skin is not jaundiced or pale. Findings: No rash. Neurological:      General: No focal deficit present. Mental Status: She is alert and oriented to person, place, and time. Cranial Nerves: No cranial nerve deficit. Sensory: No sensory deficit. Coordination: Coordination normal.   Psychiatric:         Mood and Affect: Mood normal.         Behavior: Behavior normal.         Thought Content: Thought content normal.         Judgment: Judgment normal.       Vitals:    10/05/21 1338   BP: 126/74   Site: Left Upper Arm   Position: Sitting   Cuff Size: Large Adult   Pulse: 70   Weight: 200 lb 6.4 oz (90.9 kg)   Height: 5' 3\" (1.6 m)       Assessment:  1. Sleep disorder  Stable on Sonata      2. Chronic major depressive disorder  Stable on Lexapro. Continue the same    3. History of breast cancer  Follows with oncology. No antihormonal therapy due to side effects. Last anti-hormonal therapy 2016. Yearly mammograms in December. 4. Dyslipidemia  The 10-year ASCVD risk score (Aleksandra Scales, et al., 2013) is: 19.5%    Values used to calculate the score:      Age: 67 years      Sex: Female      Is Non- : No      Diabetic: No      Tobacco smoker: No      Systolic Blood Pressure: 053 mmHg      Is BP treated: No      HDL Cholesterol: 64 mg/dL      Total Cholesterol: 264 mg/dL  Declines statin drugs. Aware of increased cardiovascular risk. Work on diet remain active    5. Primary osteoarthritis of both hands  Being set up with gel injections through orthopedics    6. Osteopenia, unspecified location  Continues on calcium and vitamin D. Declines further prescription aids. Aware of risk    7. MGUS (monoclonal gammopathy of unknown significance)  Ongoing follow-up with hematology yearly    8. Peripheral polyneuropathy  Stable at present off medications    9.  Vitamin D deficiency  Stable on supplemental vitamin D    10. IFG (impaired fasting glucose)  A1c 5.9. Continue to work on diet, remain active  - Hemoglobin A1C; Future  - Basic Metabolic Panel; Future    11. Panlobular emphysema (HCC)  Stable at present    12. Lumbar spondylosis  Status post lumbar fusion. 13. Seasonal allergies  Stable. Uses OTC medications as directed    14. Encounter for screening mammogram for breast cancer  15. Encounter for screening mammogram for malignant neoplasm of breast  16. Other screening mammogram  - [facility] DIGITAL SCREEN BILATERAL; Future      Plan:  As noted above. Follow up for routine visit. Call sooner with concerns prior.     Electronically signed by NICOLASA Akins CNP on 10/5/2021 at 2:38 PM

## 2021-10-06 LAB
ALBUMIN (CALCULATED): 4.6 G/DL (ref 3.2–5.2)
ALBUMIN PERCENT: 65 % (ref 45–65)
ALPHA 1 PERCENT: 2 % (ref 3–6)
ALPHA 2 PERCENT: 10 % (ref 6–13)
ALPHA-1-GLOBULIN: 0.1 G/DL (ref 0.1–0.4)
ALPHA-2-GLOBULIN: 0.7 G/DL (ref 0.5–0.9)
BETA GLOBULIN: 0.6 G/DL (ref 0.5–1.1)
BETA PERCENT: 9 % (ref 11–19)
GAMMA GLOBULIN %: 15 % (ref 9–20)
GAMMA GLOBULIN: 1.1 G/DL (ref 0.5–1.5)
PATHOLOGIST: ABNORMAL
PATHOLOGIST: NORMAL
PROTEIN ELECTROPHORESIS, SERUM: ABNORMAL
SERUM IFX INTERP: NORMAL
TOTAL PROT. SUM,%: 101 % (ref 98–102)
TOTAL PROT. SUM: 7.1 G/DL (ref 6.3–8.2)
TOTAL PROTEIN: 7.1 G/DL (ref 6.4–8.3)

## 2021-10-07 ASSESSMENT — ENCOUNTER SYMPTOMS
SORE THROAT: 0
SINUS PRESSURE: 0
WHEEZING: 0
SHORTNESS OF BREATH: 0
COUGH: 0
EYE PAIN: 0
COLOR CHANGE: 0
CONSTIPATION: 0
ABDOMINAL PAIN: 0
BLOOD IN STOOL: 0
CHEST TIGHTNESS: 0
RHINORRHEA: 0
VOMITING: 0
NAUSEA: 0
TROUBLE SWALLOWING: 0
DIARRHEA: 0
FACIAL SWELLING: 0

## 2021-10-25 DIAGNOSIS — F32.9 CHRONIC MAJOR DEPRESSIVE DISORDER: ICD-10-CM

## 2021-10-25 RX ORDER — ESCITALOPRAM OXALATE 10 MG/1
10 TABLET ORAL DAILY
Qty: 90 TABLET | Refills: 3 | Status: SHIPPED | OUTPATIENT
Start: 2021-10-25 | End: 2022-04-18 | Stop reason: SDUPTHER

## 2021-10-25 RX ORDER — ESCITALOPRAM OXALATE 10 MG/1
10 TABLET ORAL DAILY
Qty: 10 TABLET | Refills: 0 | Status: SHIPPED | OUTPATIENT
Start: 2021-10-25 | End: 2021-10-27

## 2021-10-25 NOTE — TELEPHONE ENCOUNTER
Patient spoke with the call center and stated she has no refills remaining.     Last visit 10/5/21 next visit 4/25/22

## 2021-10-25 NOTE — TELEPHONE ENCOUNTER
Short-term refill to [facility] per patient request until mail order arrives. Patient is out of medication.

## 2021-10-25 NOTE — TELEPHONE ENCOUNTER
Please make sure patient is not on Daypro and Voltaren together. She also should not be taking ibuprofen if she is on either of these.

## 2021-10-26 DIAGNOSIS — G47.9 SLEEP DISORDER: ICD-10-CM

## 2021-10-26 RX ORDER — ZALEPLON 10 MG/1
10 CAPSULE ORAL NIGHTLY PRN
Qty: 14 CAPSULE | Refills: 0 | Status: SHIPPED | OUTPATIENT
Start: 2021-10-26 | End: 2021-10-26

## 2021-10-26 RX ORDER — ZALEPLON 10 MG/1
10 CAPSULE ORAL NIGHTLY PRN
Qty: 90 CAPSULE | Refills: 1 | Status: SHIPPED | OUTPATIENT
Start: 2021-10-26 | End: 2022-04-18 | Stop reason: SDUPTHER

## 2021-10-26 NOTE — TELEPHONE ENCOUNTER
Last appt: 10/5/2021  Next appt: Visit date not found    Patient is completely out of the medication and needs a two week supply sent to [facility] then a normal script sent to [facility]

## 2021-10-27 NOTE — TELEPHONE ENCOUNTER
Patient only using Voltaren Gel on prn basis - uses infrequently. She no longer take Voltaren tabs or Daypro. She doesn't use any other NSAIDs. She recently had knee injections per Dr. Vikas Keating) and she felt this made her symptoms \"60%\" better. She states she is not a canidate for joint replacement at this time.

## 2022-01-17 ENCOUNTER — OFFICE VISIT (OUTPATIENT)
Dept: ONCOLOGY | Age: 77
End: 2022-01-17
Payer: MEDICARE

## 2022-01-17 VITALS
SYSTOLIC BLOOD PRESSURE: 120 MMHG | BODY MASS INDEX: 36.39 KG/M2 | DIASTOLIC BLOOD PRESSURE: 70 MMHG | HEIGHT: 63 IN | WEIGHT: 205.4 LBS | TEMPERATURE: 97.1 F | HEART RATE: 78 BPM | OXYGEN SATURATION: 97 %

## 2022-01-17 DIAGNOSIS — D47.2 MGUS (MONOCLONAL GAMMOPATHY OF UNKNOWN SIGNIFICANCE): Primary | ICD-10-CM

## 2022-01-17 DIAGNOSIS — E66.01 SEVERE OBESITY (BMI 35.0-35.9 WITH COMORBIDITY) (HCC): ICD-10-CM

## 2022-01-17 PROCEDURE — 99214 OFFICE O/P EST MOD 30 MIN: CPT | Performed by: INTERNAL MEDICINE

## 2022-01-17 PROCEDURE — 99211 OFF/OP EST MAY X REQ PHY/QHP: CPT | Performed by: INTERNAL MEDICINE

## 2022-01-17 RX ORDER — IBUPROFEN 200 MG
200 TABLET ORAL EVERY 6 HOURS PRN
COMMUNITY

## 2022-01-17 NOTE — PROGRESS NOTES
updated. Past Medical History:   Past Medical History:   Diagnosis Date    Breast CA ([facility] [address]     2/15 s/p XRT & Dr Gala Vaca following    Closed fracture of right foot with routine healing 09/2021    COPD (chronic obstructive pulmonary disease) ([facility] [address] 03/2004    mild obstructive defect on PFT's    Depression     Dyslipidemia     Hyperplastic colon polyp     Colonoscopy 2/16/15 with repeat recommended 2/16/2020    IFG (impaired fasting glucose) 05/09/2017    Insomnia     MGUS (monoclonal gammopathy of unknown significance)     0.64 grams/decilier IgG lambda, 04/12. 1.) UPUP negative 04/12. Stable July 2014    Osteoarthritis     particularly in the hands    Osteopenia     DEXA, 01/11/04, T -1.7 spine, T -0.2  hip 1.) T -1.6 spine, T -0.2 hip, 05/07 2.) Repeat DEXA scan, 10/09, with -1.3 spine, T -0.1 hip. 3.) DEXA, 04/12, T -1.4 spine, T -0.0 hip.  4.) Treated with Fosamax x 8 yrs, discontinued 01/12 repeat DEXA 10/14 FRAX 2.4% 10 year risk at hip    Peripheral neuropathy     with mild to moderate sensorimotor peripheral polyneuropathy noted on EMG, 11/11    Vitamin D deficiency        Past Surgical History:     Past Surgical History:   Procedure Laterality Date    APPENDECTOMY      BACK SURGERY  10/11/2019    BREAST BIOPSY Left 11-12-14    US guided brest bx - benign    BREAST LUMPECTOMY Left 02/16/2015    with needle placement- MUCINOUS CARCINOMA- shw    COLONOSCOPY  02/28/03    normal-reed    COLONOSCOPY  10/30/98    normal-reed    COLONOSCOPY  2008    normal, family hx colon cancer- al-jadda    COLONOSCOPY  2/2015    6 cm polyp-benign, repeat 5yrs- dennis    COLONOSCOPY N/A 11/9/2020    diverticulosis and hyperplastic polyp x1; Dr. Claudell Kemps at [facility] Right 1990's, 2002    [place]     HYSTERECTOMY      WITH BILATERAL SALPINGO OOPHORECTOMY    KNEE ARTHROSCOPY  2011    RIGHT KNEE    LASIK Bilateral     LUMBAR SPINE SURGERY Right 6/21/2019    Right L4 L5 TRANSFORAMINAL performed by Hitesh Flores MD at [id number] [place] [address] Dr Arredondo 7/23/2019    Right L4 & L5 TRANSFORAMINAL performed by Hitesh Flores MD at [facility] Left [id number]    left sentinal node biopsy    TUBAL LIGATION  08/74       Patient Family Social History:     Social History     Social History Narrative    She works ar [employer]. 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 She has 2 adult sons, 4 grandkids. Current Medications:     Current Outpatient Medications   Medication Sig Dispense Refill    ibuprofen (ADVIL;MOTRIN) 200 MG tablet Take 200 mg by mouth every 6 hours as needed for Pain      diclofenac sodium (VOLTAREN) 1 % GEL Apply 2 g topically daily as needed for Pain (Uses infrequently)      zaleplon (SONATA) 10 MG capsule Take 1 capsule by mouth nightly as needed (sleep). 90 capsule 1    escitalopram (LEXAPRO) 10 MG tablet Take 1 tablet by mouth daily 90 tablet 3    fluticasone (FLONASE) 50 MCG/ACT nasal spray 1 spray by Nasal route daily 1 Bottle 3    Calcium Citrate-Vitamin D (CALCIUM CITRATE + D PO) Take 1 tablet by mouth daily      Handicap Placard MISC by Does not apply route The disability is expected to last 9/5/2024 1 each 0    magnesium (MAGNESIUM-OXIDE) 250 MG TABS tablet Take 250 mg by mouth daily      aspirin 81 MG tablet Take 81 mg by mouth as needed       B Complex Vitamins (VITAMIN B COMPLEX PO) Take by mouth daily      BLACK COHOSH HOT FLASH RELIEF PO Take 1-2 tablets by mouth daily as needed      acetaminophen (TYLENOL) 500 MG tablet Take 1,500 mg by mouth 2 times daily Indications: pt states she is taking abouot 3 to 4 500mg capsule BID (Patient not taking: Reported on 1/17/2022)       No current facility-administered medications for this visit.        Allergies:   Crestor [rosuvastatin], Cymbalta [duloxetine hcl], Influenza vaccines, Lipitor, Meloxicam, Pravastatin, Prednisone, Red yeast rice [monascus purpureus went yeast], Zetia [ezetimibe], Zocor [simvastatin], Floxin [ofloxacin], and Gabapentin    Review of Systems:    Constitutional: No fever or chills. No night sweats, no weight loss   Eyes: No eye discharge, double vision, or eye pain   HEENT: negative for sore mouth, sore throat, hoarseness and voice change   Respiratory: negative for cough , sputum, dyspnea, wheezing, hemoptysis, chest pain   Cardiovascular: negative for chest pain, dyspnea, palpitations, orthopnea, PND   Gastrointestinal: negative for nausea, vomiting, diarrhea, constipation, abdominal pain, Dysphagia, hematemesis and hematochezia   Genitourinary: negative for frequency, dysuria, nocturia, urinary incontinence, and hematuria   Integument: negative for rash, skin lesions, bruises.    Hematologic/Lymphatic: negative for easy bruising, bleeding, lymphadenopathy, or petechiae   Endocrine: negative for heat or cold intolerance,weight changes, change in bowel habits and hair loss   Musculoskeletal: negative for myalgias, arthralgias, pain, joint swelling,and bone pain   Neurological: negative for headaches, dizziness, seizures, weakness, numbness        Physical Exam:    Vitals: /70 (Site: Left Upper Arm, Position: Sitting, Cuff Size: Large Adult)   Pulse 78   Temp 97.1 °F (36.2 °C)   Ht 5' 3\" (1.6 m)   Wt 205 lb 6.4 oz (93.2 kg)   SpO2 97%   BMI 36.38 kg/m²   General appearance - well appearing, no in pain or distress  Mental status - AAO X3  Eyes - pupils equal and reactive, extraocular eye movements intact  Mouth - mucous membranes moist, pharynx normal without lesions  Neck - supple, no significant adenopat  Lymphatics - no palpable lymphadenopathy, no hepatosplenomegaly  Chest - clear to auscultation, no wheezes, rales or rhonchi, symmetric air entry  Heart - normal rate, regular rhythm, normal S1, S2, no murmurs  Abdomen - soft, nontender, nondistended, no masses or organomegaly  Neurological - alert, oriented, normal speech, no focal findings or movement disorder noted  Extremities - peripheral pulses normal, no pedal edema, no clubbing or cyanosis  Skin - normal coloration and turgor, no rashes, no suspicious skin lesions noted   breast-bilateral breast examination was performed in the presence of a female nurse. Does not reveal any palpable mass or skin abnormality other than healed scar from previous surgery. No lymphadenopathy noted in bilateral axilla     DATA:    CBC:   No results for input(s): WBC, HGB, PLT in the last 72 hours. BMP:    No results for input(s): NA, K, CL, CO2, BUN, CREATININE, GLUCOSE in the last 72 hours. Hepatic:   No results for input(s): AST, ALT, ALB, BILITOT, ALKPHOS in the last 72 hours. Results for orders placed or performed during the hospital encounter of 10/01/21   Lactate Dehydrogenase   Result Value Ref Range     (H) 135 - 214 U/L   Immunofixation Serum Profile   Result Value Ref Range    Serum IFX Interp       A ZONE OF RESTRICTION IS PRESENT IN THE GAMMAGLOBULIN REGION. CONFIRMED BY IMMUNOFIXATION TO BE MONOCLONAL    Pathologist ELECTRONICALLY SIGNED. Lidia Winter M.D. Alligator/Lambda Quant Free Light Chains Serum   Result Value Ref Range    Kappa Free Light Chains QNT 1.20 0.37 - 1.94 mg/dL    Lambda Free Light Chains QNT 5.39 (H) 0.57 - 2.63 mg/dL    Free Kappa/Lambda Ratio 0.22 (L) 0.26 - 1.65   Protein Electrophoresis, Serum   Result Value Ref Range    Total Protein 7.1 6.4 - 8.3 g/dL    Albumin (calculated) 4.6 3.2 - 5.2 g/dL    Albumin % 65 45 - 65 %    Alpha-1-Globulin 0.1 0.1 - 0.4 g/dL    Alpha 1 % 2 (L) 3 - 6 %    Alpha-2-Globulin 0.7 0.5 - 0.9 g/dL    Alpha 2 % 10 6 - 13 %    Beta Globulin 0.6 0.5 - 1.1 g/dL    Beta Percent 9 (L) 11 - 19 %    Gamma Globulin 1.1 0.5 - 1.5 g/dL    Gamma Globulin % 15 9 - 20 %    Total Prot. Sum 7.1 6.3 - 8.2 g/dL    Total Prot.  Sum,% 101 98 - 102 %    Protein Electrophoresis, Serum       A ZONE OF RESTRICTION IS PRESENT IN THE GAMMAGLOBULIN REGION. CONFIRMED BY IMMUNOFIXATION TO BE MONOCLONAL    Pathologist ELECTRONICALLY SIGNED. Heidi Smith M.D.     Lipid Panel   Result Value Ref Range    Cholesterol 264 (H) <200 mg/dL    HDL 64 >40 mg/dL    LDL Cholesterol 166 (H) 0 - 130 mg/dL    Chol/HDL Ratio 4.1 <5    Triglycerides 170 (H) <150 mg/dL    VLDL NOT REPORTED (H) 1 - 30 mg/dL   Hepatitis C Antibody   Result Value Ref Range    Hepatitis C Ab NONREACTIVE NONREACTIVE   Vitamin D 25 Hydroxy   Result Value Ref Range    Vit D, 25-Hydroxy 32.7 30.0 - 100.0 ng/mL   Comprehensive Metabolic Panel   Result Value Ref Range    Glucose 96 70 - 99 mg/dL    BUN 29 (H) 8 - 23 mg/dL    CREATININE 0.76 0.50 - 0.90 mg/dL    Bun/Cre Ratio 38 (H) 9 - 20    Calcium 10.0 8.6 - 10.4 mg/dL    Sodium 137 135 - 144 mmol/L    Potassium 5.1 3.7 - 5.3 mmol/L    Chloride 101 98 - 107 mmol/L    CO2 25 20 - 31 mmol/L    Anion Gap 11 9 - 17 mmol/L    Alkaline Phosphatase 55 35 - 104 U/L    ALT 15 5 - 33 U/L    AST 19 <32 U/L    Total Bilirubin 0.29 (L) 0.3 - 1.2 mg/dL    Total Protein 7.6 6.4 - 8.3 g/dL    Albumin 4.2 3.5 - 5.2 g/dL    Albumin/Globulin Ratio 1.2 1.0 - 2.5    GFR Non-African American >60 >60 mL/min    GFR African American >60 >60 mL/min    GFR Comment          GFR Staging NOT REPORTED    CBC Auto Differential   Result Value Ref Range    WBC 5.6 3.5 - 11.3 k/uL    RBC 3.99 3.95 - 5.11 m/uL    Hemoglobin 11.8 (L) 11.9 - 15.1 g/dL    Hematocrit 37.3 36.3 - 47.1 %    MCV 93.5 82.6 - 102.9 fL    MCH 29.6 25.2 - 33.5 pg    MCHC 31.6 25.2 - 33.5 g/dL    RDW 13.2 11.8 - 14.4 %    Platelets 386 514 - 560 k/uL    MPV 10.4 8.1 - 13.5 fL    NRBC Automated 0.0 0.0 per 100 WBC    Differential Type NOT REPORTED     Seg Neutrophils 56 36 - 65 %    Lymphocytes 27 24 - 43 %    Monocytes 12 3 - 12 %    Eosinophils % 4 1 - 4 %    Basophils 1 0 - 2 %    Immature Granulocytes 0 0 %    Segs Absolute 3.16 1.50 - 8.10 k/uL Absolute Lymph # 1.50 1.10 - 3.70 k/uL    Absolute Mono # 0.69 0.10 - 1.20 k/uL    Absolute Eos # 0.20 0.00 - 0.44 k/uL    Basophils Absolute 0.06 0.00 - 0.20 k/uL    Absolute Immature Granulocyte <0.03 0.00 - 0.30 k/uL    WBC Morphology NOT REPORTED     RBC Morphology NOT REPORTED     Platelet Estimate NOT REPORTED    Hemoglobin A1C   Result Value Ref Range    Hemoglobin A1C 5.9 4.0 - 6.0 %    Estimated Avg Glucose 123 mg/dL     Mammogram       Impression   No mammographic evidence of malignancy. BIRADS:   BIRADS - CATEGORY 2       Benign, no evidence of malignancy. Normal interval follow-up is recommended   in 12 months. OVER ALL ASSESSMENT - BENIGN       A letter of notification will be sent to the patient regarding the results. Impression:  Invasive infiltrating ductal carcinoma of left breast status post lumpectomy and adjuvant radiation therapy. Only received 1 year of anti-hormonal therapy. IgG lambda paraproteinemia  neuropathy    Plan:  I reviewed recent lab work, imaging studies and discussed the diagnosis and treatment recommendations   She is more than 5 years out from her initial diagnosis of breast cancer and is being followed annually with annual mammogram   She will get mammogram this time   Her MGUS panel shows stable monoclonal protein   I will repeat her lab work in 1 year   Return to clinic in 1 year or earlier if needed         MD Travis Garcia MD  Hematologist/Medical Oncologist    On this date 1/17/22  I have spent 40 minutes reviewing previous notes, test results and face to face with the patient discussing the diagnosis and importance of compliance with the treatment plan. Greater than 50% of that time was spent face-to-face with the patient in counseling and coordinating her care.       This note is created with the assistance of a speech recognition program.  While intending to generate a document that actually reflects the content of the visit, the document can still have some errors including those of syntax and sound a like substitutions which may escape proof reading. It such instances, actual meaning can be extrapolated by contextual diversion.

## 2022-01-27 ENCOUNTER — HOSPITAL ENCOUNTER (OUTPATIENT)
Dept: MAMMOGRAPHY | Age: 77
Discharge: HOME | End: 2022-01-29
Payer: MEDICARE

## 2022-01-27 VITALS — HEIGHT: 63 IN | WEIGHT: 195 LBS | BODY MASS INDEX: 34.55 KG/M2

## 2022-01-27 DIAGNOSIS — Z12.31 OTHER SCREENING MAMMOGRAM: ICD-10-CM

## 2022-01-27 PROCEDURE — 77063 BREAST TOMOSYNTHESIS BI: CPT

## 2022-02-15 ENCOUNTER — HOSPITAL ENCOUNTER (OUTPATIENT)
Dept: PHYSICAL THERAPY | Age: 77
Setting detail: THERAPIES SERIES
Discharge: HOME | End: 2022-02-15
Payer: MEDICARE

## 2022-02-15 PROCEDURE — 97161 PT EVAL LOW COMPLEX 20 MIN: CPT | Performed by: PHYSICAL THERAPIST

## 2022-02-15 ASSESSMENT — PAIN - FUNCTIONAL ASSESSMENT: PAIN_FUNCTIONAL_ASSESSMENT: PREVENTS OR INTERFERES WITH ALL ACTIVE AND SOME PASSIVE ACTIVITIES

## 2022-02-15 ASSESSMENT — PAIN DESCRIPTION - DESCRIPTORS: DESCRIPTORS: ACHING;SHARP

## 2022-02-15 ASSESSMENT — PAIN DESCRIPTION - ORIENTATION: ORIENTATION: LEFT

## 2022-02-15 ASSESSMENT — PAIN DESCRIPTION - LOCATION: LOCATION: KNEE

## 2022-02-15 ASSESSMENT — PAIN DESCRIPTION - PROGRESSION: CLINICAL_PROGRESSION: GRADUALLY WORSENING

## 2022-02-15 ASSESSMENT — PAIN DESCRIPTION - FREQUENCY: FREQUENCY: CONTINUOUS

## 2022-02-15 ASSESSMENT — PAIN SCALES - GENERAL: PAINLEVEL_OUTOF10: 9

## 2022-02-15 ASSESSMENT — PAIN DESCRIPTION - PAIN TYPE: TYPE: CHRONIC PAIN

## 2022-02-15 ASSESSMENT — PAIN DESCRIPTION - ONSET: ONSET: PROGRESSIVE

## 2022-02-15 NOTE — PROGRESS NOTES
Physical Therapy  Initial Assessment  Date: 2/15/2022  Patient Name: Markus Owens  MRN: [id number]  : 1945     Treatment Diagnosis: L knee grade 4 OA    Restrictions- none       Subjective   General  Chart Reviewed: Yes  Patient assessed for rehabilitation services?: Yes  Response To Previous Treatment: Not applicable  Family / Caregiver Present: No  Referring Practitioner: Min Alaniz MD- Indu Kendall  Referral Date : 22  Diagnosis: M17.12 OA L knee  Follows Commands: Within Functional Limits  PT Visit Information  Onset Date: 22  PT Insurance Information: [employer] Medicare  Subjective  Subjective: L TKA scheduled 22, in [facility]  Pain Screening  Patient Currently in Pain: Yes  Pain Assessment  Pain Assessment: 0-10  Pain Level: 9  Pain Type: Chronic pain  Pain Location: Knee  Pain Orientation: Left  Pain Descriptors: Aching; Sharp  Pain Frequency: Continuous  Pain Onset: Progressive  Clinical Progression: Gradually worsening  Functional Pain Assessment: Prevents or interferes with all active and some passive activities  Vital Signs  Patient Currently in Pain: Yes    Vision/Hearing  Vision  Vision: Within Functional Limits  Hearing  Hearing: Within functional limits    Orientation  Orientation  Overall Orientation Status: Within Normal Limits    Social/Functional History  Social/Functional History  Lives With: Spouse  Type of Home: House  Home Layout: One level  Home Access: Stairs to enter without rails  Entrance Stairs - Number of Steps: 2  Home Equipment: Rolling walker  Receives Help From: Family  ADL Assistance: Independent  Homemaking Assistance: Independent  Ambulation Assistance: Independent  Transfer Assistance: Independent  Active : Yes  Mode of Transportation: Car  Occupation: Retired  IADL Comments: Plans HH PT upon d/c home    Objective     Observation/Palpation  Posture: Fair  Observation: Severe limp on L knee    PROM LLE (degrees)  L Knee Flexion 0-145: 60  L Knee Extension 0: -20    Strength LLE  L Knee Flexion: 3-/5  L Knee Extension: 3-/5     Ambulation  Ambulation?: Yes  WB Status: WBAT L  Ambulation 1  Surface: level tile  Device: Rolling Walker  Assistance: Modified Independent  Gait Deviations: Slow Zenaida  Comments: Reviewed WBAT gait  Stairs/Curb  Stairs?: Yes  Stairs  # Steps : 2  Stairs Height: 8\"  Comment: Reviewed up/down steps, curbs     Assessment   Conditions Requiring Skilled Therapeutic Intervention  Body structures, Functions, Activity limitations: Decreased ROM; Decreased strength; Increased pain  Treatment Diagnosis: L knee grade 4 OA  Prognosis: Good  Decision Making: Low Complexity  REQUIRES PT FOLLOW UP: No  Activity Tolerance  Activity Tolerance: Patient Tolerated treatment well     OutComes Score   LEFI 20/80 = 75%                   Goals  Short term goals  Time Frame for Short term goals: 1 day  Short term goal 1: Complete pre-op instruction, and HEP for L TKA       Therapy Time   Individual Concurrent Group Co-treatment   Time In 1500         Time Out 1529         Minutes 29                 Zafar Harness, PT

## 2022-03-29 ENCOUNTER — HOSPITAL ENCOUNTER (OUTPATIENT)
Dept: NON INVASIVE DIAGNOSTICS | Age: 77
Discharge: HOME | End: 2022-03-29
Payer: MEDICARE

## 2022-03-29 ENCOUNTER — HOSPITAL ENCOUNTER (OUTPATIENT)
Dept: LAB | Age: 77
Discharge: HOME | End: 2022-03-29
Payer: MEDICARE

## 2022-03-29 DIAGNOSIS — R73.01 IFG (IMPAIRED FASTING GLUCOSE): ICD-10-CM

## 2022-03-29 LAB
ANION GAP SERPL CALCULATED.3IONS-SCNC: 7 MMOL/L (ref 9–17)
ANION GAP SERPL CALCULATED.3IONS-SCNC: 7 MMOL/L (ref 9–17)
BUN BLDV-MCNC: 25 MG/DL (ref 8–23)
BUN BLDV-MCNC: 25 MG/DL (ref 8–23)
BUN/CREAT BLD: 32 (ref 9–20)
CALCIUM SERPL-MCNC: 9.7 MG/DL (ref 8.6–10.4)
CHLORIDE BLD-SCNC: 103 MMOL/L (ref 98–107)
CHLORIDE BLD-SCNC: 103 MMOL/L (ref 98–107)
CO2: 28 MMOL/L (ref 20–31)
CO2: 28 MMOL/L (ref 20–31)
CREAT SERPL-MCNC: 0.77 MG/DL (ref 0.5–0.9)
CREAT SERPL-MCNC: 0.77 MG/DL (ref 0.5–0.9)
EKG ATRIAL RATE: 67 BPM
EKG P AXIS: 66 DEGREES
EKG P-R INTERVAL: 132 MS
EKG Q-T INTERVAL: 424 MS
EKG QRS DURATION: 80 MS
EKG QTC CALCULATION (BAZETT): 448 MS
EKG R AXIS: 14 DEGREES
EKG T AXIS: 69 DEGREES
EKG VENTRICULAR RATE: 67 BPM
GFR AFRICAN AMERICAN: >60 ML/MIN
GFR AFRICAN AMERICAN: >60 ML/MIN
GFR NON-AFRICAN AMERICAN: >60 ML/MIN
GFR NON-AFRICAN AMERICAN: >60 ML/MIN
GFR SERPL CREATININE-BSD FRML MDRD: ABNORMAL ML/MIN/{1.73_M2}
GFR SERPL CREATININE-BSD FRML MDRD: NORMAL ML/MIN/{1.73_M2}
GLUCOSE BLD-MCNC: 108 MG/DL (ref 70–99)
HCT VFR BLD CALC: 37.6 % (ref 36.3–47.1)
HEMOGLOBIN: 12.1 G/DL (ref 11.9–15.1)
MCH RBC QN AUTO: 29.2 PG (ref 25.2–33.5)
MCHC RBC AUTO-ENTMCNC: 32.2 G/DL (ref 25.2–33.5)
MCV RBC AUTO: 90.6 FL (ref 82.6–102.9)
NRBC AUTOMATED: 0 PER 100 WBC
PDW BLD-RTO: 13.4 % (ref 11.8–14.4)
PLATELET # BLD: 250 K/UL (ref 138–453)
PMV BLD AUTO: 11 FL (ref 8.1–13.5)
POTASSIUM SERPL-SCNC: 4.9 MMOL/L (ref 3.7–5.3)
POTASSIUM SERPL-SCNC: 4.9 MMOL/L (ref 3.7–5.3)
RBC # BLD: 4.15 M/UL (ref 3.95–5.11)
SODIUM BLD-SCNC: 138 MMOL/L (ref 135–144)
SODIUM BLD-SCNC: 138 MMOL/L (ref 135–144)
WBC # BLD: 5.4 K/UL (ref 3.5–11.3)

## 2022-03-29 PROCEDURE — 36415 COLL VENOUS BLD VENIPUNCTURE: CPT

## 2022-03-29 PROCEDURE — 85027 COMPLETE CBC AUTOMATED: CPT

## 2022-03-29 PROCEDURE — 80051 ELECTROLYTE PANEL: CPT

## 2022-03-29 PROCEDURE — 84520 ASSAY OF UREA NITROGEN: CPT

## 2022-03-29 PROCEDURE — 93005 ELECTROCARDIOGRAM TRACING: CPT | Performed by: ORTHOPAEDIC SURGERY

## 2022-03-29 PROCEDURE — 80048 BASIC METABOLIC PNL TOTAL CA: CPT

## 2022-03-29 PROCEDURE — 83036 HEMOGLOBIN GLYCOSYLATED A1C: CPT

## 2022-03-29 PROCEDURE — 82565 ASSAY OF CREATININE: CPT

## 2022-03-30 LAB
ESTIMATED AVERAGE GLUCOSE: 120 MG/DL
HBA1C MFR BLD: 5.8 % (ref 4–6)

## 2022-04-18 ENCOUNTER — OFFICE VISIT (OUTPATIENT)
Dept: INTERNAL MEDICINE | Age: 77
End: 2022-04-18
Payer: MEDICARE

## 2022-04-18 ENCOUNTER — HOSPITAL ENCOUNTER (OUTPATIENT)
Dept: GENERAL RADIOLOGY | Age: 77
Discharge: HOME | End: 2022-04-20
Payer: MEDICARE

## 2022-04-18 VITALS
RESPIRATION RATE: 16 BRPM | BODY MASS INDEX: 37.39 KG/M2 | TEMPERATURE: 97.1 F | SYSTOLIC BLOOD PRESSURE: 122 MMHG | DIASTOLIC BLOOD PRESSURE: 70 MMHG | HEART RATE: 58 BPM | HEIGHT: 63 IN | OXYGEN SATURATION: 98 % | WEIGHT: 211 LBS

## 2022-04-18 DIAGNOSIS — G47.9 SLEEP DISORDER: ICD-10-CM

## 2022-04-18 DIAGNOSIS — R73.01 IFG (IMPAIRED FASTING GLUCOSE): ICD-10-CM

## 2022-04-18 DIAGNOSIS — M85.80 OSTEOPENIA, UNSPECIFIED LOCATION: ICD-10-CM

## 2022-04-18 DIAGNOSIS — D47.2 MGUS (MONOCLONAL GAMMOPATHY OF UNKNOWN SIGNIFICANCE): ICD-10-CM

## 2022-04-18 DIAGNOSIS — R94.31 ABNORMAL EKG: ICD-10-CM

## 2022-04-18 DIAGNOSIS — E55.9 VITAMIN D DEFICIENCY: ICD-10-CM

## 2022-04-18 DIAGNOSIS — G62.9 PERIPHERAL POLYNEUROPATHY: ICD-10-CM

## 2022-04-18 DIAGNOSIS — Z01.818 PRE-OP EVALUATION: ICD-10-CM

## 2022-04-18 DIAGNOSIS — M17.12 LOCALIZED OSTEOARTHRITIS OF LEFT KNEE: Primary | ICD-10-CM

## 2022-04-18 DIAGNOSIS — J43.1 PANLOBULAR EMPHYSEMA (HCC): ICD-10-CM

## 2022-04-18 DIAGNOSIS — E78.5 DYSLIPIDEMIA: ICD-10-CM

## 2022-04-18 DIAGNOSIS — F32.9 CHRONIC MAJOR DEPRESSIVE DISORDER: ICD-10-CM

## 2022-04-18 PROCEDURE — 71046 X-RAY EXAM CHEST 2 VIEWS: CPT

## 2022-04-18 PROCEDURE — 99214 OFFICE O/P EST MOD 30 MIN: CPT | Performed by: NURSE PRACTITIONER

## 2022-04-18 RX ORDER — ESCITALOPRAM OXALATE 10 MG/1
10 TABLET ORAL DAILY
Qty: 90 TABLET | Refills: 3 | Status: SHIPPED | OUTPATIENT
Start: 2022-04-18

## 2022-04-18 RX ORDER — ZALEPLON 10 MG/1
10 CAPSULE ORAL NIGHTLY PRN
Qty: 90 CAPSULE | Refills: 1 | Status: SHIPPED | OUTPATIENT
Start: 2022-04-18 | End: 2022-10-18 | Stop reason: SDUPTHER

## 2022-04-18 ASSESSMENT — PATIENT HEALTH QUESTIONNAIRE - PHQ9
SUM OF ALL RESPONSES TO PHQ QUESTIONS 1-9: 0
SUM OF ALL RESPONSES TO PHQ9 QUESTIONS 1 & 2: 0
2. FEELING DOWN, DEPRESSED OR HOPELESS: 0
9. THOUGHTS THAT YOU WOULD BE BETTER OFF DEAD, OR OF HURTING YOURSELF: 0
6. FEELING BAD ABOUT YOURSELF - OR THAT YOU ARE A FAILURE OR HAVE LET YOURSELF OR YOUR FAMILY DOWN: 0
3. TROUBLE FALLING OR STAYING ASLEEP: 0
8. MOVING OR SPEAKING SO SLOWLY THAT OTHER PEOPLE COULD HAVE NOTICED. OR THE OPPOSITE, BEING SO FIGETY OR RESTLESS THAT YOU HAVE BEEN MOVING AROUND A LOT MORE THAN USUAL: 0
SUM OF ALL RESPONSES TO PHQ QUESTIONS 1-9: 0
7. TROUBLE CONCENTRATING ON THINGS, SUCH AS READING THE NEWSPAPER OR WATCHING TELEVISION: 0
SUM OF ALL RESPONSES TO PHQ QUESTIONS 1-9: 0
1. LITTLE INTEREST OR PLEASURE IN DOING THINGS: 0
5. POOR APPETITE OR OVEREATING: 0
10. IF YOU CHECKED OFF ANY PROBLEMS, HOW DIFFICULT HAVE THESE PROBLEMS MADE IT FOR YOU TO DO YOUR WORK, TAKE CARE OF THINGS AT HOME, OR GET ALONG WITH OTHER PEOPLE: 0
4. FEELING TIRED OR HAVING LITTLE ENERGY: 0
SUM OF ALL RESPONSES TO PHQ QUESTIONS 1-9: 0

## 2022-04-18 NOTE — PROGRESS NOTES
04/18/22  Andreea Medrano  1945      Chief Complaint:   1. Localized osteoarthritis of left knee    2. Pre-op evaluation    3. Abnormal EKG    4. Chronic major depressive disorder    5. Sleep disorder    6. Panlobular emphysema ([address]    7. Dyslipidemia    8. Osteopenia, unspecified location    9. MGUS (monoclonal gammopathy of unknown significance)    10. Peripheral polyneuropathy    11. Vitamin D deficiency    12. IFG (impaired fasting glucose)        HPI:  65-year-old patient being seen for preoperative evaluation for left total knee. Patient did have preop work-up through more surgeon. Patient with mild changes on her EKG. She states she has chronic dyspnea on exertion denies any chest discomfort. Remains very active in the home except for recently due to the left knee flaring up. States she has been trying to get a lot of extra stuff done because she knows she will be down for a while after the surgery. Her mood has been stable. She continues on a sleep aid for the sleep disorder. Feels that this is not working as well as it used to but unsure if it is because she has had more things going on or because of the pain of the knee. Declines changing the medication at present wants to wait till after the surgery is completed and her pain is better. She is intolerant to statins. She does have dyslipidemia.   Follows with hematology for her MGUS    Allergies   Allergen Reactions    Crestor [Rosuvastatin]      MUSCLE PAIN/ACHES      Cymbalta [Duloxetine Hcl]      Disorientation, excessive sleepiness    Influenza Vaccines Other (See Comments)     Causes problems with eating eggs    Lipitor      MUSCLE PAIN/ACHES      Meloxicam Other (See Comments)     constipation    Pravastatin      MUSCLE PAIN/ACHES    Prednisone      Lost eyelashes       Red Yeast Rice [Monascus Purpureus Went Yeast]      POORLY TOLERATED    Zetia [Ezetimibe]      CONSTIPATION      Zocor [Simvastatin]      MUSCLE PAIN/ACHES    Floxin [Ofloxacin] Nausea And Vomiting    Gabapentin Rash       Past Medical History:   Diagnosis Date    Breast CA ([facility] [address]     2/15 s/p XRT & Dr Noel Faustin following    Closed fracture of right foot with routine healing 09/2021    COPD (chronic obstructive pulmonary disease) ([facility] [address] 03/2004    mild obstructive defect on PFT's    Depression     Dyslipidemia     History of therapeutic radiation     Hyperplastic colon polyp     Colonoscopy 2/16/15 with repeat recommended 2/16/2020    IFG (impaired fasting glucose) 05/09/2017    Insomnia     MGUS (monoclonal gammopathy of unknown significance)     0.64 grams/decilier IgG lambda, 04/12. 1.) UPUP negative 04/12. Stable July 2014    Osteoarthritis     particularly in the hands    Osteopenia     DEXA, 01/11/04, T -1.7 spine, T -0.2  hip 1.) T -1.6 spine, T -0.2 hip, 05/07 2.) Repeat DEXA scan, 10/09, with -1.3 spine, T -0.1 hip. 3.) DEXA, 04/12, T -1.4 spine, T -0.0 hip.  4.) Treated with Fosamax x 8 yrs, discontinued 01/12 repeat DEXA 10/14 FRAX 2.4% 10 year risk at hip    Peripheral neuropathy     with mild to moderate sensorimotor peripheral polyneuropathy noted on EMG, 11/11    Vitamin D deficiency        Past Surgical History:   Procedure Laterality Date    APPENDECTOMY      BACK SURGERY  10/11/2019    BREAST BIOPSY Left 11-12-14    US guided brest bx - benign    BREAST LUMPECTOMY Left 02/16/2015    with needle placement- MUCINOUS CARCINOMA- shw    COLONOSCOPY  02/28/03    normal-reed    COLONOSCOPY  10/30/98    normal-reed    COLONOSCOPY  2008    normal, family hx colon cancer- al-jadda    COLONOSCOPY  2/2015    6 cm polyp-benign, repeat 5yrs- dennis    COLONOSCOPY N/A 11/9/2020    diverticulosis and hyperplastic polyp x1; Dr. Cook Sarthak at [address] Right 1990's, 2002    [place]     HYSTERECTOMY      WITH BILATERAL SALPINGO OOPHORECTOMY    KNEE ARTHROSCOPY  2011    RIGHT KNEE    LASIK Bilateral     LUMBAR SPINE SURGERY Right 2019    Right L4 L5 TRANSFORAMINAL performed by Ty Bedoya MD at [address] [place] [address] Dr Arredondo 2019    Right L4 & L5 TRANSFORAMINAL performed by Ty Bedoya MD at [address] [place] [address] Left [id number]    left sentinal node biopsy    OVARY REMOVAL      TUBAL LIGATION         Current Outpatient Medications on File Prior to Visit   Medication Sig Dispense Refill    ibuprofen (ADVIL;MOTRIN) 200 MG tablet Take 200 mg by mouth every 6 hours as needed for Pain      diclofenac sodium (VOLTAREN) 1 % GEL Apply 2 g topically daily as needed for Pain (Uses infrequently)      fluticasone (FLONASE) 50 MCG/ACT nasal spray 1 spray by Nasal route daily 1 Bottle 3    Calcium Citrate-Vitamin D (CALCIUM CITRATE + D PO) Take 1 tablet by mouth daily      magnesium (MAGNESIUM-OXIDE) 250 MG TABS tablet Take 250 mg by mouth daily      aspirin 81 MG tablet Take 81 mg by mouth daily       B Complex Vitamins (VITAMIN B COMPLEX PO) Take by mouth daily      BLACK COHOSH HOT FLASH RELIEF PO Take 1-2 tablets by mouth daily as needed      Handicap Placard MISC by Does not apply route The disability is expected to last 2024 1 each 0     No current facility-administered medications on file prior to visit. Social History     Socioeconomic History    Marital status:       Spouse name: Not on file    Number of children: 2    Years of education: Not on file    Highest education level: Not on file   Occupational History    Not on file   Tobacco Use    Smoking status: Former Smoker     Packs/day: 0.50     Years: 10.00     Pack years: 5.00     Quit date: 2014     Years since quittin.9    Smokeless tobacco: Never Used    Tobacco comment: quit 10/11 - smokes some days   Substance and Sexual Activity    Alcohol use: Yes     Comment: 1 drink a week    Drug use: No    Sexual activity: Not on file   Other Topics Concern    Not on file   Social History Narrative    She works [employer]. 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 She has 2 adult sons, 4 grandkids. Social Determinants of Health     Financial Resource Strain:     Difficulty of Paying Living Expenses: Not on file   Food Insecurity:     Worried About Running Out of Food in the Last Year: Not on file    Surya of Food in the Last Year: Not on file   Transportation Needs:     Lack of Transportation (Medical): Not on file    Lack of Transportation (Non-Medical): Not on file   Physical Activity:     Days of Exercise per Week: Not on file    Minutes of Exercise per Session: Not on file   Stress:     Feeling of Stress : Not on file   Social Connections:     Frequency of Communication with Friends and Family: Not on file    Frequency of Social Gatherings with Friends and Family: Not on file    Attends Yarsanism Services: Not on file    Active Member of [address] [place] [address] or Organizations: Not on file    Attends Club or Organization Meetings: Not on file    Marital Status: Not on file   Intimate Partner Violence:     Fear of Current or Ex-Partner: Not on file    Emotionally Abused: Not on file    Physically Abused: Not on file    Sexually Abused: Not on file   Housing Stability:     Unable to Pay for Housing in the Last Year: Not on file    Number of [place] in the Last Year: Not on file    Unstable Housing in the Last Year: Not on file       Review of Systems   Constitutional: Negative for activity change, appetite change, chills, fatigue, fever and unexpected weight change. HENT: Negative for congestion, dental problem, ear discharge, ear pain, facial swelling, hearing loss, postnasal drip, rhinorrhea, sinus pressure, sore throat and trouble swallowing. Eyes: Negative for pain and visual disturbance. Respiratory: Negative for cough, chest tightness, shortness of breath (Chronic dyspnea on exertion) and wheezing. Cardiovascular: Negative for chest pain, palpitations and leg swelling. Gastrointestinal: Negative for abdominal pain, blood in stool, constipation, diarrhea, nausea and vomiting. Endocrine: Negative for cold intolerance, heat intolerance and polyuria. Genitourinary: Negative for difficulty urinating. Musculoskeletal: Positive for arthralgias and gait problem. Negative for myalgias, neck pain and neck stiffness. Skin: Negative for color change, rash and wound. Neurological: Negative for dizziness, tremors, seizures, weakness, light-headedness, numbness and headaches. Psychiatric/Behavioral: Positive for sleep disturbance. Negative for confusion, hallucinations, self-injury and suicidal ideas. The patient is not nervous/anxious. Physical Exam  Vitals and nursing note reviewed. Constitutional:       General: She is not in acute distress. Appearance: Normal appearance. She is well-developed. She is not diaphoretic. HENT:      Head: Normocephalic and atraumatic. Right Ear: External ear normal.      Left Ear: External ear normal.   Eyes:      General:         Right eye: No discharge. Left eye: No discharge. Neck:      Trachea: No tracheal deviation. Cardiovascular:      Rate and Rhythm: Normal rate and regular rhythm. Heart sounds: Normal heart sounds. No murmur heard. No friction rub. No gallop. Pulmonary:      Effort: Pulmonary effort is normal. No respiratory distress. Breath sounds: Normal breath sounds. No stridor. No wheezing, rhonchi or rales. Chest:      Chest wall: No tenderness. Abdominal:      General: Bowel sounds are normal. There is no distension. Palpations: Abdomen is soft. Tenderness: There is no abdominal tenderness. Musculoskeletal:         General: No swelling. Right lower leg: No edema. Left lower leg: No edema. Skin:     General: Skin is warm and dry.       Capillary Refill: Capillary refill takes less than 2 seconds. Coloration: Skin is not jaundiced or pale. Findings: No rash. Neurological:      General: No focal deficit present. Mental Status: She is alert and oriented to person, place, and time. Cranial Nerves: No cranial nerve deficit. Sensory: No sensory deficit. Coordination: Coordination normal.   Psychiatric:         Mood and Affect: Mood normal.         Behavior: Behavior normal.         Thought Content: Thought content normal.         Judgment: Judgment normal.       Vitals:    04/18/22 1441   BP: 122/70   Site: Right Upper Arm   Position: Sitting   Cuff Size: Large Adult   Pulse: 58   Resp: 16   Temp: 97.1 °F (36.2 °C)   TempSrc: Temporal   SpO2: 98%   Weight: 211 lb (95.7 kg)   Height: 5' 3\" (1.6 m)       Assessment:  1. Localized osteoarthritis of left knee  2. Pre-op evaluation  EKG with mild changes. Will obtain cardiac evaluation  Needs chest x-ray  - XR CHEST STANDARD (2 VW); Future    3. Abnormal EKG  - ECHO Complete 2D W Doppler W Color; Future    4. Chronic major depressive disorder  Stable on Lexapro, refill provided  - escitalopram (LEXAPRO) 10 MG tablet; Take 1 tablet by mouth daily  Dispense: 90 tablet; Refill: 3    5. Sleep disorder  Wants to continue on a Sonata. We will reevaluate postoperatively  - zaleplon (SONATA) 10 MG capsule; Take 1 capsule by mouth nightly as needed (sleep). Dispense: 90 capsule; Refill: 1    6. Panlobular emphysema (HCC)  Chronic dyspnea on exertion. Stable at present    7. Dyslipidemia  The 10-year ASCVD risk score (Ruddy Summers et al., 2013) is: 16.9%    Values used to calculate the score:      Age: 67 years      Sex: Female      Is Non- : No      Diabetic: No      Tobacco smoker: No      Systolic Blood Pressure: 587 mmHg      Is BP treated: No      HDL Cholesterol: 64 mg/dL      Total Cholesterol: 264 mg/dL  Continue to work on diet, remain active intolerant to statin drugs    8.  Osteopenia, unspecified location  Continue on calcium and vitamin D. Weightbearing exercises    9. MGUS (monoclonal gammopathy of unknown significance)  Follows with hematology, stable    10. Peripheral polyneuropathy  Stable at present    11. Vitamin D deficiency  Stable on supplemental vitamin D    12. IFG (impaired fasting glucose)  Last hemoglobin A1c 3/29/2022 5.8. Continue to work on diet, remain active      Plan:  As noted above. Will obtain chest x-ray and cardiac clearance. Patient will need to hold Advil, supplements, aspirin 1 week prior to surgery  Follow up for routine visit. Call sooner with concerns prior. Electronically signed by NICOLASA Vitale CNP on 4/18/2022 at 7:06 PM     Addendum  Echocardiogram with no acute findings, did receive cardiac clearance. Chest x-ray with right lower lobe opacities. Patient essentially asymptomatic no cough normal white cell.   At the discretion of anesthesiology to get repeat chest x-ray prior to surgery

## 2022-04-19 DIAGNOSIS — R91.8 OPACITY OF LUNG ON IMAGING STUDY: Primary | ICD-10-CM

## 2022-04-19 DIAGNOSIS — Z01.818 PRE-OP EVALUATION: ICD-10-CM

## 2022-04-20 ENCOUNTER — HOSPITAL ENCOUNTER (OUTPATIENT)
Dept: NON INVASIVE DIAGNOSTICS | Age: 77
Discharge: HOME | End: 2022-04-20
Payer: MEDICARE

## 2022-04-20 DIAGNOSIS — R94.31 ABNORMAL EKG: ICD-10-CM

## 2022-04-20 LAB
LV EF: 65 %
LVEF MODALITY: NORMAL

## 2022-04-20 PROCEDURE — 93306 TTE W/DOPPLER COMPLETE: CPT

## 2022-04-22 ENCOUNTER — OFFICE VISIT (OUTPATIENT)
Dept: CARDIOLOGY | Age: 77
End: 2022-04-22
Payer: MEDICARE

## 2022-04-22 VITALS
DIASTOLIC BLOOD PRESSURE: 64 MMHG | SYSTOLIC BLOOD PRESSURE: 116 MMHG | HEIGHT: 63 IN | WEIGHT: 211.6 LBS | BODY MASS INDEX: 37.49 KG/M2

## 2022-04-22 DIAGNOSIS — E78.5 DYSLIPIDEMIA: ICD-10-CM

## 2022-04-22 DIAGNOSIS — R94.31 ABNORMAL EKG: ICD-10-CM

## 2022-04-22 DIAGNOSIS — Z01.818 PRE-OP EVALUATION: Primary | ICD-10-CM

## 2022-04-22 PROCEDURE — 99202 OFFICE O/P NEW SF 15 MIN: CPT | Performed by: INTERNAL MEDICINE

## 2022-04-22 PROCEDURE — 99204 OFFICE O/P NEW MOD 45 MIN: CPT | Performed by: INTERNAL MEDICINE

## 2022-04-22 ASSESSMENT — ENCOUNTER SYMPTOMS
CHEST TIGHTNESS: 0
BLOOD IN STOOL: 0
COUGH: 0
ABDOMINAL PAIN: 0
EYE PAIN: 0
DIARRHEA: 0
COLOR CHANGE: 0
SINUS PRESSURE: 0
RHINORRHEA: 0
TROUBLE SWALLOWING: 0
VOMITING: 0
CONSTIPATION: 0
SORE THROAT: 0
FACIAL SWELLING: 0
NAUSEA: 0
WHEEZING: 0
SHORTNESS OF BREATH: 0

## 2022-04-22 NOTE — PROGRESS NOTES
Today's Date: 4/22/2022  Patient's Name: Ashly Gatica  Patient's age: 67 y.o., 1945    Subjective: The patient is a 67y.o. year old, , female is in the office for initial cardiac evaluation for preop total knee replacement  She is doing quit well from cardiac stand point. She tu any chest pain or discomfort. No orthopnea or PND. Tu any palpitation, dizziness or syncope. She is completely asymptomatic from cardiac stand point. She has chronic shortness of breath related to her COPD and her activity level is somewhat limited because of diffuse arthritis. Past Medical History:   has a past medical history of Breast CA ([facility] [address] Closed fracture of right foot with routine healing, COPD (chronic obstructive pulmonary disease) ([facility] [address] Depression, Dyslipidemia, History of therapeutic radiation, Hyperplastic colon polyp, IFG (impaired fasting glucose), Insomnia, MGUS (monoclonal gammopathy of unknown significance), Osteoarthritis, Osteopenia, Peripheral neuropathy, and Vitamin D deficiency. Past Surgical History:   has a past surgical history that includes Hysterectomy; Appendectomy; Tubal ligation (08/74); Knee arthroscopy (2011); Foot surgery (Right, 1990's, 2002); Breast biopsy (Left, 11-12-14); Breast lumpectomy (Left, 02/16/2015); lymph node biopsy (Left, [id number]); Colonoscopy (02/28/03); Colonoscopy (10/30/98); Colonoscopy (2008); Colonoscopy (2/2015); eye surgery; LASIK (Bilateral); Lumbar spine surgery (Right, 6/21/2019); Lumbar spine surgery (Right, 7/23/2019); back surgery (10/11/2019); Colonoscopy (N/A, 11/9/2020); and Ovary removal.    Home Medications:  Prior to Admission medications    Medication Sig Start Date End Date Taking? Authorizing Provider   zaleplon (SONATA) 10 MG capsule Take 1 capsule by mouth nightly as needed (sleep).  4/18/22 4/18/23  NICOLASA Araujo - CNP   escitalopram (LEXAPRO) 10 MG tablet Take 1 tablet by mouth daily 4/18/22   NICOLASA Araujo - CNP   ibuprofen (ADVIL;MOTRIN) 200 MG tablet Take 200 mg by mouth every 6 hours as needed for Pain    Historical Provider, MD   diclofenac sodium (VOLTAREN) 1 % GEL Apply 2 g topically daily as needed for Pain (Uses infrequently)    Historical Provider, MD   fluticasone (FLONASE) 50 MCG/ACT nasal spray 1 spray by Nasal route daily 3/23/21   NICOLASA Sadler CNP   Calcium Citrate-Vitamin D (CALCIUM CITRATE + D PO) Take 1 tablet by mouth daily    Historical Provider, MD   Handicadavid [facility] [address] [facility] by Does not apply route The disability is expected to last 9/5/2024 9/5/19   NICOLASA Sadler CNP   magnesium (MAGNESIUM-OXIDE) 250 MG TABS tablet Take 250 mg by mouth daily    Historical Provider, MD   aspirin 81 MG tablet Take 81 mg by mouth daily     Historical Provider, MD   B Complex Vitamins (VITAMIN B COMPLEX PO) Take by mouth daily    Historical Provider, MD   BLACK COHOSH HOT FLASH RELIEF PO Take 1-2 tablets by mouth daily as needed    Historical Provider, MD       Allergies:  Crestor [rosuvastatin], Cymbalta [duloxetine hcl], Influenza vaccines, Lipitor, Meloxicam, Pravastatin, Prednisone, Red yeast rice [monascus Dharmesh [employer] went yeast], Zetia [ezetimibe], Zocor [simvastatin], Floxin [ofloxacin], and Gabapentin    Social History:   reports that she quit smoking about 7 years ago. She has a 5.00 pack-year smoking history. She has never used smokeless tobacco. She reports current alcohol use. She reports that she does not use drugs. Review of Systems:  · Constitutional: there has been no unanticipated weight loss. There's been No change in energy level, No change in activity level. · Eyes: No visual changes or diplopia. No scleral icterus. · ENT: No Headaches, hearing loss or vertigo. No mouth sores or sore throat. · Cardiovascular: As above. · Respiratory: Positive for chronic shortness of breath  · Gastrointestinal: No abdominal pain, appetite loss, blood in stools.  No change in bowel or bladder habits. · Genitourinary: No dysuria, trouble voiding, or hematuria. · Musculoskeletal: As above  · Integumentary: No rash or pruritis. · Neurological: No headache, diplopia, change in muscle strength, numbness or tingling. No change in gait, balance, coordination, mood, affect, memory, mentation, behavior. · Psychiatric: No anxiety, or depression. · Endocrine: No temperature intolerance. No excessive thirst, fluid intake, or urination. No tremor. · Hematologic/Lymphatic: No abnormal bruising or bleeding, blood clots or swollen lymph nodes. · Allergic/Immunologic: No nasal congestion or hives. Physical Exam:  There were no vitals taken for this visit. Constitutional and General Appearance: alert, cooperative, no distress and appears stated age  [de-identified]: PERRL, no cervical lymphadenopathy. No masses palpable. Normal oral mucosa  Respiratory:  · Normal excursion and expansion without use of accessory muscles  · Resp Auscultation: Good respiratory effort. No for increased work of breathing. On auscultation: clear to auscultation bilaterally  Cardiovascular:  · The apical impulse is not displaced  · Heart tones are crisp and normal. regular S1 and S2.  · Jugular venous pulsation Normal  · The carotid upstroke is normal in amplitude and contour without delay or bruit  · Peripheral pulses are symmetrical and full   Abdomen:   · No masses or tenderness  · Bowel sounds present  Extremities:  ·  No Cyanosis or Clubbing  ·  Lower extremity edema: No  ·  Skin: Warm and dry  Neurological:  · Alert and oriented. · Moves all extremities well  · No abnormalities of mood, affect, memory, mentation, or behavior are noted    Cardiac Data:  Diagnostics:    EKG: normal EKG, normal sinus rhythm, nonspecific ST and T waves changes, unchanged from previous tracings.     Labs:     FASTING LIPID PANEL:  Lab Results   Component Value Date    HDL 64 10/01/2021    TRIG 170 10/01/2021         IMPRESSION:    Patient Active Problem List   Diagnosis    Insomnia    Dyslipidemia    Osteoarthritis    Menopausal symptoms    Osteopenia    COPD (chronic obstructive pulmonary disease) (HCC)    MGUS (monoclonal gammopathy of unknown significance)    Peripheral neuropathy    Vitamin D deficiency    Hallux limitus of right foot    Chronic major depressive disorder    Primary osteoarthritis of both hands    IFG (impaired fasting glucose)    History of hand x-ray-suggesting inflammatory arthritis    Hand arthritis    Lumbar radiculitis    Lumbar spondylosis    Chronic right-sided low back pain with right-sided sciatica    History of breast cancer     Echocardiogram 4/20/2022:  Normal left ventricular diameter. Left ventricular systolic function is normal.  Left ventricular ejection fraction 65 %. No doppler evidence of diastolic dysfunction. Mitral valve leaflet thickening with prolapse. Mild mitral regurgitation. Normal tricuspid valve leaflets. Mild tricuspid regurgitation. Estimated right ventricular systolic pressure is 33 mmHg. Normal function of other valves. No pericardial effusion. Assessment / Acute Cardiac Problems:     1-Preop risk stratification for total knee replacement  2-HLP: With intolerance to statins  3-Preserved LV systolic function on echo. 4-history of breast cancer  5-COPD      Plan of Treatment:     1-since patient is completely asymptomatic with unremarkable EKG and normal 2D echocardiogram with no significant risk factors for cardiac disease her upcoming risk for surgery is low and I gave her clearance with low cardiac risk. 2-Diet, exercise and loose weight. 3-F/U on an as-needed basis.     Electronically signed by Hector Goddard MD on 4/22/2022 at [address] [facility]  [phone]

## 2022-05-09 ENCOUNTER — TELEPHONE (OUTPATIENT)
Dept: INTERNAL MEDICINE | Age: 77
End: 2022-05-09
Payer: MEDICARE

## 2022-05-09 DIAGNOSIS — Z47.1 AFTERCARE FOLLOWING JOINT REPLACEMENT SURGERY, UNSPECIFIED JOINT: Primary | ICD-10-CM

## 2022-05-09 DIAGNOSIS — Z79.82 ENCOUNTER FOR LONG-TERM (CURRENT) USE OF ASPIRIN: ICD-10-CM

## 2022-05-09 DIAGNOSIS — Z96.652 PRESENCE OF LEFT ARTIFICIAL KNEE JOINT: ICD-10-CM

## 2022-05-09 PROCEDURE — G0180 MD CERTIFICATION HHA PATIENT: HCPCS | Performed by: NURSE PRACTITIONER

## 2022-05-09 RX ORDER — OXYCODONE HYDROCHLORIDE AND ACETAMINOPHEN 5; 325 MG/1; MG/1
TABLET ORAL
COMMUNITY
Start: 2022-05-06 | End: 2022-10-18 | Stop reason: ALTCHOICE

## 2022-05-09 RX ORDER — ASPIRIN 325 MG
325 TABLET ORAL 2 TIMES DAILY
COMMUNITY
Start: 2022-04-28 | End: 2022-10-18

## 2022-05-09 RX ORDER — CELECOXIB 100 MG/1
100 CAPSULE ORAL DAILY
COMMUNITY
Start: 2022-04-28 | End: 2022-05-31

## 2022-05-09 RX ORDER — TRAMADOL HYDROCHLORIDE 50 MG/1
TABLET ORAL
COMMUNITY
Start: 2022-04-28 | End: 2022-10-18 | Stop reason: ALTCHOICE

## 2022-05-31 ENCOUNTER — OFFICE VISIT (OUTPATIENT)
Dept: PRIMARY CARE CLINIC | Age: 77
End: 2022-05-31
Payer: MEDICARE

## 2022-05-31 VITALS
DIASTOLIC BLOOD PRESSURE: 74 MMHG | SYSTOLIC BLOOD PRESSURE: 112 MMHG | BODY MASS INDEX: 36.31 KG/M2 | OXYGEN SATURATION: 98 % | HEART RATE: 68 BPM | TEMPERATURE: 98.3 F | WEIGHT: 205 LBS

## 2022-05-31 DIAGNOSIS — K08.89 PAIN, DENTAL: ICD-10-CM

## 2022-05-31 DIAGNOSIS — R09.81 CONGESTION OF NASAL SINUS: ICD-10-CM

## 2022-05-31 DIAGNOSIS — K04.7 DENTAL ABSCESS: Primary | ICD-10-CM

## 2022-05-31 DIAGNOSIS — J06.9 VIRAL UPPER RESPIRATORY ILLNESS: ICD-10-CM

## 2022-05-31 PROCEDURE — 1123F ACP DISCUSS/DSCN MKR DOCD: CPT | Performed by: NURSE PRACTITIONER

## 2022-05-31 PROCEDURE — 99212 OFFICE O/P EST SF 10 MIN: CPT | Performed by: NURSE PRACTITIONER

## 2022-05-31 PROCEDURE — 99213 OFFICE O/P EST LOW 20 MIN: CPT | Performed by: NURSE PRACTITIONER

## 2022-05-31 RX ORDER — AMOXICILLIN AND CLAVULANATE POTASSIUM 875; 125 MG/1; MG/1
1 TABLET, FILM COATED ORAL 2 TIMES DAILY
Qty: 20 TABLET | Refills: 0 | Status: SHIPPED | OUTPATIENT
Start: 2022-05-31 | End: 2022-05-31 | Stop reason: SINTOL

## 2022-05-31 RX ORDER — CLINDAMYCIN HYDROCHLORIDE 300 MG/1
300 CAPSULE ORAL 4 TIMES DAILY
Qty: 40 CAPSULE | Refills: 0 | Status: SHIPPED | OUTPATIENT
Start: 2022-05-31 | End: 2022-06-10

## 2022-05-31 ASSESSMENT — ENCOUNTER SYMPTOMS
RESPIRATORY NEGATIVE: 1
EYES NEGATIVE: 1
ABDOMINAL PAIN: 0
RHINORRHEA: 1
ALLERGIC/IMMUNOLOGIC NEGATIVE: 1
VOMITING: 0
COUGH: 0
SINUS PRESSURE: 1
NAUSEA: 1

## 2022-05-31 ASSESSMENT — PATIENT HEALTH QUESTIONNAIRE - PHQ9
SUM OF ALL RESPONSES TO PHQ QUESTIONS 1-9: 0
SUM OF ALL RESPONSES TO PHQ QUESTIONS 1-9: 0
2. FEELING DOWN, DEPRESSED OR HOPELESS: 0
SUM OF ALL RESPONSES TO PHQ9 QUESTIONS 1 & 2: 0
1. LITTLE INTEREST OR PLEASURE IN DOING THINGS: 0
SUM OF ALL RESPONSES TO PHQ QUESTIONS 1-9: 0
SUM OF ALL RESPONSES TO PHQ QUESTIONS 1-9: 0

## 2022-05-31 NOTE — PROGRESS NOTES
[address] [facility] A department of [facility] 99  Phone: [phone]  Fax: [phone]      Buck Jorgensen is a 67 y.o. female who presents to the [facility] today for her medical conditions/complaints as noted below. Buck Jorgensen is c/o of Dental Pain (abcess on gums feels like has sinus infection )          HPI:     Dental Pain   This is a chronic (Since April.) problem. The current episode started more than 1 month ago. The problem occurs constantly. The problem has been gradually worsening. The pain is at a severity of 6/10. The pain is moderate. Associated symptoms include facial pain, sinus pressure and thermal sensitivity. Pertinent negatives include no difficulty swallowing or fever. Treatments tried: Quit takng post op pain meds. Had ibuprofen last night. The treatment provided mild relief. Past Medical History:   Diagnosis Date    Breast CA ([facility] [address]     2/15 s/p XRT & Dr Noel Faustin following    Closed fracture of right foot with routine healing 09/2021    COPD (chronic obstructive pulmonary disease) ([facility] [address] 03/2004    mild obstructive defect on PFT's    Depression     Dyslipidemia     History of therapeutic radiation     Hyperplastic colon polyp     Colonoscopy 2/16/15 with repeat recommended 2/16/2020    IFG (impaired fasting glucose) 05/09/2017    Insomnia     MGUS (monoclonal gammopathy of unknown significance)     0.64 grams/decilier IgG lambda, 04/12. 1.) UPUP negative 04/12. Stable July 2014    Osteoarthritis     particularly in the hands    Osteopenia     DEXA, 01/11/04, T -1.7 spine, T -0.2  hip 1.) T -1.6 spine, T -0.2 hip, 05/07 2.) Repeat DEXA scan, 10/09, with -1.3 spine, T -0.1 hip. 3.) DEXA, 04/12, T -1.4 spine, T -0.0 hip.  4.) Treated with Fosamax x 8 yrs, discontinued 01/12 repeat DEXA 10/14 FRAX 2.4% 10 year risk at hip    Peripheral neuropathy     with mild to moderate sensorimotor peripheral polyneuropathy noted on EMG, 11/11    Vitamin D deficiency         Allergies   Allergen Reactions    Crestor [Rosuvastatin]      MUSCLE PAIN/ACHES      Cymbalta [Duloxetine Hcl]      Disorientation, excessive sleepiness    Influenza Vaccines Other (See Comments)     Causes problems with eating eggs    Lipitor      MUSCLE PAIN/ACHES      Meloxicam Other (See Comments)     constipation    Pravastatin      MUSCLE PAIN/ACHES    Prednisone      Lost eyelashes       Red Yeast Rice [Monascus Purpureus Went Yeast]      POORLY TOLERATED    Zetia [Ezetimibe]      CONSTIPATION      Zocor [Simvastatin]      MUSCLE PAIN/ACHES    Floxin [Ofloxacin] Nausea And Vomiting    Gabapentin Rash       Wt Readings from Last 3 Encounters:   05/31/22 205 lb (93 kg)   04/22/22 211 lb 9.6 oz (96 kg)   04/18/22 211 lb (95.7 kg)     BP Readings from Last 3 Encounters:   05/31/22 112/74   04/22/22 116/64   04/18/22 122/70      Temp Readings from Last 3 Encounters:   05/31/22 98.3 °F (36.8 °C) (Tympanic)   04/18/22 97.1 °F (36.2 °C) (Temporal)   01/17/22 97.1 °F (36.2 °C)     Pulse Readings from Last 3 Encounters:   05/31/22 68   04/18/22 58   01/17/22 78     SpO2 Readings from Last 3 Encounters:   05/31/22 98%   04/18/22 98%   01/17/22 97%       Subjective:      Review of Systems   Constitutional: Positive for activity change (recent knee replacement) and fatigue. Negative for appetite change and fever. HENT: Positive for congestion, dental problem, postnasal drip, rhinorrhea (clear) and sinus pressure. Eyes: Negative. Respiratory: Negative. Negative for cough. Cardiovascular: Negative. Gastrointestinal: Positive for nausea. Negative for abdominal pain and vomiting. Endocrine: Negative. Genitourinary: Negative. Musculoskeletal: Positive for gait problem (recent knee replacement. ). Skin: Negative. Allergic/Immunologic: Negative. Hematological: Negative. Psychiatric/Behavioral: Negative. Objective:     Vitals:    05/31/22 1125   BP: 112/74   Site: Left Upper Arm   Position: Sitting   Cuff Size: Large Adult   Pulse: 68   Temp: 98.3 °F (36.8 °C)   TempSrc: Tympanic   SpO2: 98%   Weight: 205 lb (93 kg)     Body mass index is 36.31 kg/m². /74 (Site: Left Upper Arm, Position: Sitting, Cuff Size: Large Adult)   Pulse 68   Temp 98.3 °F (36.8 °C) (Tympanic)   Wt 205 lb (93 kg)   SpO2 98%   BMI 36.31 kg/m²   Physical Exam  Vitals and nursing note reviewed. Constitutional:       General: She is not in acute distress. Appearance: She is ill-appearing. HENT:      Right Ear: Hearing, tympanic membrane, ear canal and external ear normal. There is no impacted cerumen. Left Ear: Hearing, tympanic membrane, ear canal and external ear normal. There is no impacted cerumen. Nose: Mucosal edema, congestion and rhinorrhea (clear) present. Rhinorrhea is clear. Right Turbinates: Swollen. Left Turbinates: Swollen. Right Sinus: Frontal sinus tenderness present. Left Sinus: Frontal sinus tenderness present. Mouth/Throat:      Lips: Pink. Mouth: Mucous membranes are moist.      Dentition: Dental tenderness, gingival swelling, dental abscesses and gum lesions present. Pharynx: Uvula midline. Posterior oropharyngeal erythema present. Tonsils: No tonsillar abscesses. Comments: Moderate amount of mucus noted in the pharynx. Eyes:      Conjunctiva/sclera: Conjunctivae normal.      Pupils: Pupils are equal, round, and reactive to light. Cardiovascular:      Rate and Rhythm: Normal rate and regular rhythm. Pulses: Normal pulses. Heart sounds: Normal heart sounds. Pulmonary:      Effort: Pulmonary effort is normal. No respiratory distress. Breath sounds: Normal breath sounds. No decreased air movement. No decreased breath sounds, wheezing, rhonchi or rales. Chest:   Breasts:      Right: No supraclavicular adenopathy.       Left: No supraclavicular adenopathy. Musculoskeletal:         General: Normal range of motion. Cervical back: Normal range of motion. Lymphadenopathy:      Cervical: No cervical adenopathy. Right cervical: No superficial or posterior cervical adenopathy. Left cervical: No superficial or posterior cervical adenopathy. Upper Body:      Right upper body: No supraclavicular adenopathy. Left upper body: No supraclavicular adenopathy. Skin:     General: Skin is warm and dry. Capillary Refill: Capillary refill takes less than 2 seconds. Neurological:      General: No focal deficit present. Mental Status: She is alert and oriented to person, place, and time. Mental status is at baseline. Psychiatric:         Mood and Affect: Mood normal.         Behavior: Behavior normal.         Judgment: Judgment normal.         Assessment:      Diagnosis Orders   1. Dental abscess  amoxicillin-clavulanate (AUGMENTIN) 875-125 MG per tablet   2. Congestion of nasal sinus     3. Pain, dental     4. Viral upper respiratory illness         Plan:   No orders of the defined types were placed in this encounter. Will treat small gum abscess with Augmentin BID for 10 days. Pharmacy called and patient reports that she is allergic to Augmentin. Antibiotic changed to Clindamycin 300mg  QID for 10 days. Augmentin allergy was listed as being removed from list in the past as not being an allergy. She was strongly encouraged to follow up with Dentist ASAP. She was encouraged to use OTC medication to treat sinus congestion which is most likely Viral URI. Recommended over the counter medications/treatments: The use of an antihistamine (Claritin or Zyrtec) and a nasal steroid spray (Flonase or Nasacort) may help with sinus congestion and drainage. Mucinex will also help thin secretions and improve congestion. Works best if drinking plenty of water.   Honey with or without Lemon may also help with coughing. Probiotics daily may help boost immune system. Alternating hot liquids with cold liquids helps to sooth sore throat  Use Acetaminophen (Tylenol) to help relieve fever, chills or body aches. If allowed, you may alternate using ibuprofen (Motrin) and Tylenol. Do not exceed 3,000mg of Tylenol in a 24 hour time period. Make sure to stay well hydrated by drinking plenty of water. Follow up with primary care provider in 1 to 2 days or as needed if no improvement or worsening of your symptoms. Discussed exam, POCT findings, plan of care, and follow-up at length with patient. Reviewed all prescribed and recommended medications, administration and side effects. Encouraged patient to follow up with PCP or return to the clinic for no improvement and or worsening of symptoms. Patient instructed to go to ER or call 911 if any difficulty breathing, shortness of breath, inability to swallow, hives or temp greater than 103 degrees. All questions were answered and they verbalized understanding and were agreeable with the plan. Return if symptoms worsen or fail to improve.         Electronically signed by NICOLASA Chavez CNP on 5/31/2022 at 11:47 AM

## 2022-05-31 NOTE — PATIENT INSTRUCTIONS
Recommended over the counter medications/treatments: The use of an antihistamine (Claritin or Zyrtec) and a nasal steroid spray (Flonase or Nasacort) may help with sinus congestion and drainage. Mucinex will also help thin secretions and improve congestion. Works best if drinking plenty of water. Honey with or without Lemon may also help with coughing. Probiotics daily may help boost immune system. Alternating hot liquids with cold liquids helps to sooth sore throat  Use Acetaminophen (Tylenol) to help relieve fever, chills or body aches. If allowed, you may alternate using ibuprofen (Motrin) and Tylenol. Do not exceed 3,000mg of Tylenol in a 24 hour time period. Make sure to stay well hydrated by drinking plenty of water. Follow up with primary care provider in 1 to 2 days or as needed if no improvement or worsening of your symptoms. Patient Education        Abscessed Tooth: Care Instructions  Overview     An abscessed tooth is a tooth that has a pocket of pus in the tissues around it. Pus forms when the body tries to fight an infection caused by bacteria. If the pus cannot drain, it forms an abscess. An abscessed tooth can cause red, swollen gums and throbbing pain, especially when you chew. You may have a badtaste in your mouth and a fever, and your jaw may swell. Damage to the tooth, untreated tooth decay, or gum disease can cause anabscessed tooth. An abscessed tooth needs to be treated by a dental professional right away. If it is not treated, the infection could spread to other parts of your body. Your dentist will give you antibiotics to stop the infection. If antibiotics don'tstop the infection, you may need other treatments. Follow-up care is a key part of your treatment and safety. Be sure to make and go to all appointments, and call your doctor if you are having problems. It's also a good idea to know your test results and keep alist of the medicines you take.   How can you care for yourself at home?  Brush and floss gently.  Reduce pain and swelling in your face and jaw by putting ice or a cold pack on the outside of your cheek. Do this for 10 to 20 minutes at a time. Put a thin cloth between the ice and your skin.  Avoid using tobacco products. Tobacco and nicotine slow your ability to heal.   Take pain medicines exactly as directed. ? If the doctor gave you a prescription medicine for pain, take it as prescribed. ? If you are not taking a prescription pain medicine, ask your doctor if you can take an over-the-counter medicine such as acetaminophen (Tylenol) or ibuprofen (Advil or Motrin). Be safe with medicines. Read and follow all instructions on the label.  Take antibiotics as directed. Do not stop taking them just because you feel better. You need to take the full course of antibiotics. When should you call for help? Call 911 anytime you think you may need emergency care. For example, call if:     You have trouble breathing. Call your doctor now or seek immediate medical care if:     You have new or worse symptoms of infection, such as:  ? Increased pain, swelling, warmth, or redness. ? Red streaks leading from the area. ? Pus draining from the area. ? A fever. Watch closely for changes in your health, and be sure to contact your doctor if:     You do not get better as expected. Where can you learn more? Go to https://[employer]ipcamp. org and sign in to your [employer] account. Enter I973 in the Located within [facility] box to learn more about \"Abscessed Tooth: Care Instructions. \"     If you do not have an account, please click on the \"Sign Up Now\" link. Current as of: June 30, 2021               Content Version: 13.2  © 0812-6896 Healthwise, Incorporated. Care instructions adapted under license by [facility] ([facility]).  If you have questions about a medical condition or this instruction, always ask your healthcare professional. Jaimee Branch disclaims any warranty or liability for your use of this information.

## 2022-06-14 ENCOUNTER — TELEPHONE (OUTPATIENT)
Dept: INTERNAL MEDICINE | Age: 77
End: 2022-06-14

## 2022-06-14 RX ORDER — AMOXICILLIN AND CLAVULANATE POTASSIUM 875; 125 MG/1; MG/1
1 TABLET, FILM COATED ORAL 2 TIMES DAILY
Qty: 20 TABLET | Refills: 0 | Status: SHIPPED | OUTPATIENT
Start: 2022-06-14 | End: 2022-06-24

## 2022-06-14 NOTE — TELEPHONE ENCOUNTER
Patient was in UC a couple of weeks ago for abscess in mouth. Was put on Clindamycin and had a reaction to it after about 4 days so she stopped taking it. Abscess is back and she needs an antibiotic for it. Uses [facility]. She had recent knee surgery and cannot have any dental work for 3months.    Let her know [phone]

## 2022-10-18 ENCOUNTER — OFFICE VISIT (OUTPATIENT)
Dept: INTERNAL MEDICINE | Age: 77
End: 2022-10-18
Payer: MEDICARE

## 2022-10-18 VITALS
DIASTOLIC BLOOD PRESSURE: 62 MMHG | BODY MASS INDEX: 36.5 KG/M2 | WEIGHT: 206 LBS | SYSTOLIC BLOOD PRESSURE: 110 MMHG | HEART RATE: 60 BPM | HEIGHT: 63 IN

## 2022-10-18 DIAGNOSIS — Z85.3 HISTORY OF BREAST CANCER: ICD-10-CM

## 2022-10-18 DIAGNOSIS — M47.816 LUMBAR SPONDYLOSIS: ICD-10-CM

## 2022-10-18 DIAGNOSIS — E78.5 DYSLIPIDEMIA: ICD-10-CM

## 2022-10-18 DIAGNOSIS — M19.042 PRIMARY OSTEOARTHRITIS OF BOTH HANDS: ICD-10-CM

## 2022-10-18 DIAGNOSIS — Z00.00 MEDICARE ANNUAL WELLNESS VISIT, SUBSEQUENT: Primary | ICD-10-CM

## 2022-10-18 DIAGNOSIS — J30.2 SEASONAL ALLERGIES: ICD-10-CM

## 2022-10-18 DIAGNOSIS — M19.041 PRIMARY OSTEOARTHRITIS OF BOTH HANDS: ICD-10-CM

## 2022-10-18 DIAGNOSIS — G47.9 SLEEP DISORDER: ICD-10-CM

## 2022-10-18 DIAGNOSIS — G62.9 PERIPHERAL POLYNEUROPATHY: ICD-10-CM

## 2022-10-18 DIAGNOSIS — J43.1 PANLOBULAR EMPHYSEMA (HCC): ICD-10-CM

## 2022-10-18 DIAGNOSIS — D47.2 MGUS (MONOCLONAL GAMMOPATHY OF UNKNOWN SIGNIFICANCE): ICD-10-CM

## 2022-10-18 DIAGNOSIS — E55.9 VITAMIN D DEFICIENCY: ICD-10-CM

## 2022-10-18 DIAGNOSIS — F32.9 CHRONIC MAJOR DEPRESSIVE DISORDER: ICD-10-CM

## 2022-10-18 DIAGNOSIS — M85.80 OSTEOPENIA, UNSPECIFIED LOCATION: ICD-10-CM

## 2022-10-18 DIAGNOSIS — R73.01 IFG (IMPAIRED FASTING GLUCOSE): ICD-10-CM

## 2022-10-18 PROCEDURE — G0439 PPPS, SUBSEQ VISIT: HCPCS | Performed by: NURSE PRACTITIONER

## 2022-10-18 PROCEDURE — 1123F ACP DISCUSS/DSCN MKR DOCD: CPT | Performed by: NURSE PRACTITIONER

## 2022-10-18 PROCEDURE — 99214 OFFICE O/P EST MOD 30 MIN: CPT | Performed by: NURSE PRACTITIONER

## 2022-10-18 RX ORDER — ASPIRIN 81 MG/1
81 TABLET ORAL DAILY
COMMUNITY

## 2022-10-18 RX ORDER — FLUTICASONE PROPIONATE 50 MCG
1 SPRAY, SUSPENSION (ML) NASAL DAILY
Qty: 1 EACH | Refills: 3 | Status: SHIPPED | OUTPATIENT
Start: 2022-10-18

## 2022-10-18 RX ORDER — OMEPRAZOLE 20 MG/1
20 CAPSULE, DELAYED RELEASE ORAL DAILY
Qty: 30 CAPSULE | Refills: 0 | Status: SHIPPED | OUTPATIENT
Start: 2022-10-18

## 2022-10-18 RX ORDER — ZALEPLON 10 MG/1
10 CAPSULE ORAL NIGHTLY PRN
Qty: 90 CAPSULE | Refills: 0 | Status: SHIPPED | OUTPATIENT
Start: 2022-10-18 | End: 2023-10-18

## 2022-10-18 ASSESSMENT — PATIENT HEALTH QUESTIONNAIRE - PHQ9
5. POOR APPETITE OR OVEREATING: 0
SUM OF ALL RESPONSES TO PHQ9 QUESTIONS 1 & 2: 0
SUM OF ALL RESPONSES TO PHQ QUESTIONS 1-9: 0
SUM OF ALL RESPONSES TO PHQ QUESTIONS 1-9: 0
2. FEELING DOWN, DEPRESSED OR HOPELESS: 0
7. TROUBLE CONCENTRATING ON THINGS, SUCH AS READING THE NEWSPAPER OR WATCHING TELEVISION: 0
1. LITTLE INTEREST OR PLEASURE IN DOING THINGS: 0
SUM OF ALL RESPONSES TO PHQ QUESTIONS 1-9: 0
4. FEELING TIRED OR HAVING LITTLE ENERGY: 0
6. FEELING BAD ABOUT YOURSELF - OR THAT YOU ARE A FAILURE OR HAVE LET YOURSELF OR YOUR FAMILY DOWN: 0
9. THOUGHTS THAT YOU WOULD BE BETTER OFF DEAD, OR OF HURTING YOURSELF: 0
8. MOVING OR SPEAKING SO SLOWLY THAT OTHER PEOPLE COULD HAVE NOTICED. OR THE OPPOSITE, BEING SO FIGETY OR RESTLESS THAT YOU HAVE BEEN MOVING AROUND A LOT MORE THAN USUAL: 0
SUM OF ALL RESPONSES TO PHQ QUESTIONS 1-9: 0
3. TROUBLE FALLING OR STAYING ASLEEP: 0
10. IF YOU CHECKED OFF ANY PROBLEMS, HOW DIFFICULT HAVE THESE PROBLEMS MADE IT FOR YOU TO DO YOUR WORK, TAKE CARE OF THINGS AT HOME, OR GET ALONG WITH OTHER PEOPLE: 0

## 2022-10-18 ASSESSMENT — LIFESTYLE VARIABLES
HOW OFTEN DO YOU HAVE A DRINK CONTAINING ALCOHOL: MONTHLY OR LESS
HOW MANY STANDARD DRINKS CONTAINING ALCOHOL DO YOU HAVE ON A TYPICAL DAY: 1 OR 2

## 2022-10-18 NOTE — PATIENT INSTRUCTIONS
Personalized Preventive Plan for Macey Whitney - 10/18/2022  Medicare offers a range of preventive health benefits. Some of the tests and screenings are paid in full while other may be subject to a deductible, co-insurance, and/or copay. Some of these benefits include a comprehensive review of your medical history including lifestyle, illnesses that may run in your family, and various assessments and screenings as appropriate. After reviewing your medical record and screening and assessments performed today your provider may have ordered immunizations, labs, imaging, and/or referrals for you. A list of these orders (if applicable) as well as your Preventive Care list are included within your After Visit Summary for your review. Other Preventive Recommendations:    A preventive eye exam performed by an eye specialist is recommended every 1-2 years to screen for glaucoma; cataracts, macular degeneration, and other eye disorders. A preventive dental visit is recommended every 6 months. Try to get at least 150 minutes of exercise per week or 10,000 steps per day on a pedometer . Order or download the FREE \"Exercise & Physical Activity: Your Everyday Guide\" from [employer]. Call 3-418.969.3187 or search [employer] online. You need 5021-5957 mg of calcium and 2708-2920 IU of vitamin D per day. It is possible to meet your calcium requirement with diet alone, but a vitamin D supplement is usually necessary to meet this goal.  When exposed to the sun, use a sunscreen that protects against both UVA and UVB radiation with an SPF of 30 or greater. Reapply every 2 to 3 hours or after sweating, drying off with a towel, or swimming. Always wear a seat belt when traveling in a car. Always wear a helmet when riding a bicycle or motorcycle.

## 2022-10-18 NOTE — PROGRESS NOTES
Medicare Annual Wellness Visit    Aracelis Foreman is here for Medicare AWV, 6 Month Follow-Up, Arthritis (Discuss continuing Daypro 600mg daily prn), and Other (Excessive saliva - pt would like to discuss rx for this)    Assessment & Plan    Recommendations for Preventive Services Due: see orders and patient instructions/AVS.  Recommended screening schedule for the next 5-10 years is provided to the patient in written form: see Patient Instructions/AVS.     No follow-ups on file. Subjective       Patient's complete Health Risk Assessment and screening values have been reviewed and are found in Flowsheets. The following problems were reviewed today and where indicated follow up appointments were made and/or referrals ordered.     Positive Risk Factor Screenings with Interventions:    Fall Risk:  Do you feel unsteady or are you worried about falling? : (!) yes  2 or more falls in past year?: no  Fall with injury in past year?: no   Fall Risk Interventions:    Home safety tips provided            General Health and ACP:  General  In general, how would you say your health is?: Good  In the past 7 days, have you experienced any of the following: New or Increased Pain, New or Increased Fatigue, Loneliness, Social Isolation, Stress or Anger?: (!) Yes  Select all that apply: (!) New or Increased Pain, Stress  Do you get the social and emotional support that you need?: (!) No  Do you have a Living Will?: Yes    Advance Directives       Power of  Living Will ACP-Advance Directive ACP-Power of     Not on File Not on File Not on File Not on File        General Health Risk Interventions:  Pain issues: see attached  Stress: see attached    Health Habits/Nutrition:  Physical Activity: Insufficiently Active    Days of Exercise per Week: 2 days    Minutes of Exercise per Session: 20 min     Have you lost any weight without trying in the past 3 months?: No  Body mass index: (!) 36.49  Have you seen the dentist within the past year?: Yes  Health Habits/Nutrition Interventions:  Encouraged healthy lifestyle habits. Safety:  Do you have working smoke detectors?: Yes  Do you have any tripping hazards - loose or unsecured carpets or rugs?: No  Do you have any tripping hazards - clutter in doorways, halls, or stairs?: No  Do you have either shower bars, grab bars, non-slip mats or non-slip surfaces in your shower or bathtub?: Yes  Do all of your stairways have a railing or banister?: (!) No  Do you always fasten your seatbelt when you are in a car?: (!) No  Safety Interventions:  Home safety tips provided           Objective   Vitals:    10/18/22 1314   BP: 110/62   Site: Right Upper Arm   Position: Sitting   Cuff Size: Large Adult   Pulse: 60   Weight: 206 lb (93.4 kg)   Height: 5' 3\" (1.6 m)      Body mass index is 36.49 kg/m². Allergies   Allergen Reactions    Atorvastatin Other (See Comments)     MUSCLE PAIN/ACHES- all statins cause muscle pain    Crestor [Rosuvastatin]      MUSCLE PAIN/ACHES      Cymbalta [Duloxetine Hcl]      Disorientation, excessive sleepiness    Influenza Vaccines Other (See Comments)     Causes problems with eating eggs  Causes problems with eating eggs    Lipitor      MUSCLE PAIN/ACHES      Meloxicam Other (See Comments)     constipation    Pravastatin      MUSCLE PAIN/ACHES    Prednisone      Lost eyelashes       Red Yeast Rice [Monascus Purpureus Went Yeast]      POORLY TOLERATED    Zetia [Ezetimibe]      CONSTIPATION      Zocor [Simvastatin]      MUSCLE PAIN/ACHES    Floxin [Ofloxacin] Nausea And Vomiting    Gabapentin Rash     Prior to Visit Medications    Medication Sig Taking?  Authorizing Provider   oxaprozin (DAYPRO) 600 MG tablet Take 600 mg by mouth daily as needed (pain) Yes Historical Provider, MD   aspirin 81 MG EC tablet Take 81 mg by mouth daily Yes Historical Provider, MD   diclofenac sodium (VOLTAREN) 1 % GEL Apply topically nightly as needed for Pain Yes Historical Provider, MD MARIS NEVESOSH PO Take 1 tablet by mouth at bedtime Yes Historical Provider, MD   zaleplon (SONATA) 10 MG capsule Take 1 capsule by mouth nightly as needed (sleep). Yes NICOLASA Ba CNP   escitalopram (LEXAPRO) 10 MG tablet Take 1 tablet by mouth daily Yes NICLOASA Ba CNP   ibuprofen (ADVIL;MOTRIN) 200 MG tablet Take 200 mg by mouth every 6 hours as needed for Pain Yes Historical Provider, MD   fluticasone (FLONASE) 50 MCG/ACT nasal spray 1 spray by Nasal route daily Yes NICOLASA Ba CNP   Calcium Citrate-Vitamin D (CALCIUM CITRATE + D PO) Take 1 tablet by mouth daily Yes Historical Provider, MD   magnesium (MAGNESIUM-OXIDE) 250 MG TABS tablet Take 250 mg by mouth daily Yes Historical Provider, MD   B Complex Vitamins (VITAMIN B COMPLEX PO) Take by mouth daily Yes Historical Provider, MD   Handicap Placard [address] [facility] by Does not apply route The disability is expected to last 9/5/2024  NICOLASA Ba CNP       CareTeam (Including outside providers/suppliers regularly involved in providing care):   Patient Care Team:  NICOLASA Ba CNP as PCP - General (Nurse Practitioner)  NICOLASA Ba CNP as PCP - [facility] Empaneled Provider     Reviewed and updated this visit:  Tobacco  Allergies  Meds  Med Hx  Surg Hx  Soc Hx  Fam Hx            I, Armin Gibson LPN, 82/81/2119, performed the documented evaluation under the direct supervision of the attending physician.

## 2022-10-20 ENCOUNTER — TELEPHONE (OUTPATIENT)
Dept: INTERNAL MEDICINE | Age: 77
End: 2022-10-20

## 2022-10-20 NOTE — TELEPHONE ENCOUNTER
Patient reminded of order for repeat chest x-ray which was ordered in April 2022. Pt states she isn't sure why she would still need this because the cardiologist told her at her last visit (4/20/22) that all of her recent reports were \"fine. \"  Reviewed last chest x-ray results with patient and recommendation to do a follow up chest x-ray. She states she have it done.

## 2022-10-23 ASSESSMENT — ENCOUNTER SYMPTOMS
WHEEZING: 0
BLOOD IN STOOL: 0
RHINORRHEA: 0
DIARRHEA: 0
ABDOMINAL PAIN: 0
EYE PAIN: 0
SHORTNESS OF BREATH: 0
NAUSEA: 0
CHEST TIGHTNESS: 0
CONSTIPATION: 0
SORE THROAT: 0
FACIAL SWELLING: 0
COLOR CHANGE: 0
SINUS PRESSURE: 0
TROUBLE SWALLOWING: 0
COUGH: 0
VOMITING: 0

## 2022-10-24 NOTE — PROGRESS NOTES
10/18/22  [facility]  1945      Chief Complaint:   1. Medicare annual wellness visit, subsequent    2. Sleep disorder    3. Chronic major depressive disorder    4. History of breast cancer    5. Dyslipidemia    6. Primary osteoarthritis of both hands    7. Osteopenia, unspecified location    8. MGUS (monoclonal gammopathy of unknown significance)    9. Peripheral polyneuropathy    10. Vitamin D deficiency    11. IFG (impaired fasting glucose)    12. Panlobular emphysema ([address]    13. Lumbar spondylosis    14. Seasonal allergies        HPI:  76-year-old patient in for annual wellness visit along with 6-month follow-up of above chronic health conditions. Of most concern is that she has had some excessive salivating. Has had some positive postnasal drip. Has been using her allergy medicine as directed. States that she is fine throughout the night it is worse throughout the day. Has had some increased reflux symptoms. She has been sleeping well as long as she uses the . Mood has been stable on the Lexapro. Denies any thoughts of harming herself or harming others. History of breast cancer patient feels that since she was on those medications in the past that is when she has noticed the excessive salivating has started. She states progressively worsening. Patient with an elevated ASCVD risk factor. She continues to work on diet declines any statin drugs aware of risk. Today continues on her Daypro for her osteoarthritis. States without that the pain becomes worse. Takes her calcium and vitamin D for her osteopenia. Follows with hematology for her monoclonal gammopathy of unknown significance. Neuropathy has been stable continues on supplemental vitamin D for deficiency. Labs have been stable. We reviewed A1c for her impaired fasting glucose this has been stable. Feels that she could have been working better at her diet. No recent COPD exacerbations.     Allergies   Allergen Reactions Atorvastatin Other (See Comments)     MUSCLE PAIN/ACHES- all statins cause muscle pain    Crestor [Rosuvastatin]      MUSCLE PAIN/ACHES      Cymbalta [Duloxetine Hcl]      Disorientation, excessive sleepiness    Influenza Vaccines Other (See Comments)     Causes problems with eating eggs  Causes problems with eating eggs    Lipitor      MUSCLE PAIN/ACHES      Meloxicam Other (See Comments)     constipation    Pravastatin      MUSCLE PAIN/ACHES    Prednisone      Lost eyelashes       Red Yeast Rice [[place] Purpureus Went Yeast]      POORLY TOLERATED    Zetia [Ezetimibe]      CONSTIPATION      Zocor [Simvastatin]      MUSCLE PAIN/ACHES    Floxin [Ofloxacin] Nausea And Vomiting    Gabapentin Rash       Past Medical History:   Diagnosis Date    Breast CA ([facility] [address]     2/15 s/p XRT & Dr Marta Cooper following    Closed fracture of right foot with routine healing 09/2021    COPD (chronic obstructive pulmonary disease) ([facility] [address] 03/2004    mild obstructive defect on PFT's    Depression     Dyslipidemia     History of therapeutic radiation     Hyperplastic colon polyp     Colonoscopy 2/16/15 with repeat recommended 2/16/2020    IFG (impaired fasting glucose) 05/09/2017    Insomnia     MGUS (monoclonal gammopathy of unknown significance)     0.64 grams/decilier IgG lambda, 04/12. 1.) UPUP negative 04/12. Stable July 2014    Osteoarthritis     particularly in the hands    Osteopenia     DEXA, 01/11/04, T -1.7 spine, T -0.2  hip 1.) T -1.6 spine, T -0.2 hip, 05/07 2.) Repeat DEXA scan, 10/09, with -1.3 spine, T -0.1 hip. 3.) DEXA, 04/12, T -1.4 spine, T -0.0 hip.  4.) Treated with Fosamax x 8 yrs, discontinued 01/12 repeat DEXA 10/14 FRAX 2.4% 10 year risk at hip    Peripheral neuropathy     with mild to moderate sensorimotor peripheral polyneuropathy noted on EMG, 11/11    Vitamin D deficiency        Past Surgical History:   Procedure Laterality Date    APPENDECTOMY      BACK SURGERY  10/11/2019    BREAST BIOPSY Left 11/12/2014    US guided brest bx - benign    BREAST LUMPECTOMY Left 02/16/2015    with needle placement- MUCINOUS CARCINOMA- shw    COLONOSCOPY  02/28/2003    normal-reed    COLONOSCOPY  10/30/1998    normal-reed    COLONOSCOPY  2008    normal, family hx colon cancer- al-jadda    COLONOSCOPY  02/2015    6 cm polyp-benign, repeat 5yrs- [facility]    COLONOSCOPY N/A 11/09/2020    diverticulosis and hyperplastic polyp x1; Dr. Fiore Ban at [address] Right 1990's, 2002    [place]     HYSTERECTOMY (CERVIX STATUS UNKNOWN)      WITH BILATERAL SALPINGO OOPHORECTOMY    KNEE ARTHROSCOPY  2011    RIGHT KNEE    LASIK Bilateral     LUMBAR SPINE SURGERY Right 06/21/2019    Right L4 L5 TRANSFORAMINAL performed by Germán Buckner MD at [address] [place] [address] Right 07/23/2019    Right L4 & L5 TRANSFORAMINAL performed by Germán Buckner MD at [address] [place] [address] Left 03/04/2015    left sentinal node biopsy    OVARY REMOVAL      TOTAL KNEE ARTHROPLASTY Left 04/27/2022    TUBAL LIGATION  08/1974       Current Outpatient Medications on File Prior to Visit   Medication Sig Dispense Refill    aspirin 81 MG EC tablet Take 81 mg by mouth daily      BLACK COHOSH PO Take 1 tablet by mouth at bedtime      escitalopram (LEXAPRO) 10 MG tablet Take 1 tablet by mouth daily 90 tablet 3    ibuprofen (ADVIL;MOTRIN) 200 MG tablet Take 200 mg by mouth every 6 hours as needed for Pain      Calcium Citrate-Vitamin D (CALCIUM CITRATE + D PO) Take 1 tablet by mouth daily      magnesium (MAGNESIUM-OXIDE) 250 MG TABS tablet Take 250 mg by mouth daily      B Complex Vitamins (VITAMIN B COMPLEX PO) Take by mouth daily      Handicap Placard MISC by Does not apply route The disability is expected to last 9/5/2024 1 each 0     No current facility-administered medications on file prior to visit. Social History     Socioeconomic History    Marital status:       Spouse name: Not on file    Number of children: 2    Years of education: Not on file    Highest education level: Not on file   Occupational History    Not on file   Tobacco Use    Smoking status: Former     Packs/day: 0.50     Years: 10.00     Pack years: 5.00     Types: Cigarettes     Quit date: 2014     Years since quittin.4    Smokeless tobacco: Never    Tobacco comments:     quit 10/11 - smokes some days   Substance and Sexual Activity    Alcohol use: Yes    Drug use: No    Sexual activity: Not on file   Other Topics Concern    Not on file   Social History Narrative    She works ar [employer] on [employer]. 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 She has 2 adult sons, 4 grandkids. Social Determinants of Health     Financial Resource Strain: Not on file   Food Insecurity: Not on file   Transportation Needs: Not on file   Physical Activity: Insufficiently Active    Days of Exercise per Week: 2 days    Minutes of Exercise per Session: 20 min   Stress: Not on file   Social Connections: Not on file   Intimate Partner Violence: Not on file   Housing Stability: Not on file       Review of Systems   Constitutional:  Negative for activity change, appetite change, chills, fatigue, fever and unexpected weight change. HENT:  Negative for congestion, dental problem, ear discharge, ear pain, facial swelling, hearing loss, postnasal drip, rhinorrhea, sinus pressure, sore throat and trouble swallowing. Eyes:  Negative for pain and visual disturbance. Respiratory:  Negative for cough, chest tightness, shortness of breath and wheezing. Cardiovascular:  Negative for chest pain, palpitations and leg swelling. Gastrointestinal:  Negative for abdominal pain, blood in stool, constipation, diarrhea, nausea and vomiting. Endocrine: Negative for cold intolerance, heat intolerance and polyuria. Genitourinary:  Negative for difficulty urinating.    Musculoskeletal:  Negative for arthralgias, gait problem, myalgias, neck pain and neck stiffness. Skin:  Negative for color change, rash and wound. Neurological:  Negative for dizziness, tremors, seizures, weakness, light-headedness, numbness and headaches. Psychiatric/Behavioral:  Negative for confusion and hallucinations. The patient is not nervous/anxious. Physical Exam  Vitals and nursing note reviewed. Constitutional:       General: She is not in acute distress. Appearance: She is well-developed. She is not diaphoretic. HENT:      Head: Normocephalic and atraumatic. Right Ear: External ear normal. No drainage, swelling or tenderness. Tympanic membrane is not perforated, erythematous, retracted or bulging. Left Ear: External ear normal. No drainage, swelling or tenderness. Tympanic membrane is not perforated, erythematous, retracted or bulging. Nose: Nose normal. No laceration, mucosal edema or rhinorrhea. Right Sinus: No maxillary sinus tenderness or frontal sinus tenderness. Left Sinus: No maxillary sinus tenderness or frontal sinus tenderness. Mouth/Throat:      Mouth: Mucous membranes are not pale and not dry. Pharynx: Uvula midline. No oropharyngeal exudate, posterior oropharyngeal erythema or uvula swelling. Tonsils: No tonsillar abscesses. Eyes:      General:         Right eye: No discharge. Left eye: No discharge. Conjunctiva/sclera: Conjunctivae normal.      Pupils: Pupils are equal, round, and reactive to light. Neck:      Trachea: No tracheal deviation. Cardiovascular:      Rate and Rhythm: Normal rate and regular rhythm. Heart sounds: Normal heart sounds. No murmur heard. No friction rub. No gallop. Pulmonary:      Effort: Pulmonary effort is normal. No respiratory distress. Breath sounds: Normal breath sounds. No wheezing or rales. Abdominal:      General: Bowel sounds are normal.      Palpations: Abdomen is soft.    Musculoskeletal:      Cervical back: Neck supple. Right lower leg: No edema. Left lower leg: No edema. Lymphadenopathy:      Cervical: No cervical adenopathy. Skin:     General: Skin is warm and dry. Findings: No rash. Neurological:      General: No focal deficit present. Mental Status: She is alert and oriented to person, place, and time. Mental status is at baseline. Cranial Nerves: No cranial nerve deficit. Motor: No weakness. Gait: Gait normal.   Psychiatric:         Mood and Affect: Mood normal.         Behavior: Behavior normal.         Thought Content: Thought content normal.         Judgment: Judgment normal.     Vitals:    10/18/22 1314   BP: 110/62   Site: Right Upper Arm   Position: Sitting   Cuff Size: Large Adult   Pulse: 60   Weight: 206 lb (93.4 kg)   Height: 5' 3\" (1.6 m)       Assessment:  1. Medicare annual wellness visit, subsequent  2. Sleep disorder  Stable on Sonata, continue the same. No signs of abuse  - zaleplon (SONATA) 10 MG capsule; Take 1 capsule by mouth nightly as needed (sleep). Dispense: 90 capsule; Refill: 0    3. Chronic major depressive disorder  Stable on Lexapro. 4. History of breast cancer  Continues to follow with oncology. No antihormonal therapy due to side effects. Last antihormonal therapy was in 2016. Gets yearly mammograms in December    5. Dyslipidemia  The 10-year ASCVD risk score (Miguelito LEE, et al., 2019) is: 16.9%    Values used to calculate the score:      Age: 65 years      Sex: Female      Is Non- : No      Diabetic: No      Tobacco smoker: No      Systolic Blood Pressure: 084 mmHg      Is BP treated: No      HDL Cholesterol: 64 mg/dL      Total Cholesterol: 264 mg/dL  Again aware of elevated ASCVD risk factors. Is going to work more on diet. Declines statin drug due to side effects  - Lipid Panel; Future    6. Primary osteoarthritis of both hands  Stable on Daypro. This was given to her by orthopedics.   Asking for us to refill as she is not following with them routinely    7. Osteopenia, unspecified location  Continues on calcium and vitamin D. Declines any further prescription aids. Aware of increased fracture risk    8. MGUS (monoclonal gammopathy of unknown significance)  Continues to follow with hematology yearly    9. Peripheral polyneuropathy  Stable off meds    10. Vitamin D deficiency  Continues on supplemental vitamin D.  Labs stable  - Vitamin D 25 Hydroxy; Future    11. IFG (impaired fasting glucose)  Work on diet increase activity, serial lab follow-ups  - Hemoglobin A1C; Future    12. Panlobular emphysema (HCC)  No recent exacerbations has been stable    13. Lumbar spondylosis  Status post lumbar fusion pain significantly improved    14. Seasonal allergies  Use her Flonase in second-generation antihistamine  - fluticasone (FLONASE) 50 MCG/ACT nasal spray; 1 spray by Nasal route daily  Dispense: 1 each; Refill: 3      Plan: For the excessive salivating we will have her continue on her antihistamine add Flonase add short-term PPI as she has had some increased reflux symptoms. If symptoms persist discussed ENT eval  As noted above. Follow up for routine visit. Call sooner with concerns prior.     Electronically signed by NICOLASA Greene CNP on 10/23/2022 at 9:00 PM

## 2022-12-07 DIAGNOSIS — G47.9 SLEEP DISORDER: ICD-10-CM

## 2022-12-07 RX ORDER — ZALEPLON 10 MG/1
10 CAPSULE ORAL NIGHTLY PRN
Qty: 90 CAPSULE | Refills: 0 | Status: SHIPPED | OUTPATIENT
Start: 2022-12-07 | End: 2023-12-07

## 2023-01-04 DIAGNOSIS — Z12.31 OTHER SCREENING MAMMOGRAM: Primary | ICD-10-CM

## 2023-01-17 ENCOUNTER — HOSPITAL ENCOUNTER (OUTPATIENT)
Age: 78
Discharge: HOME | End: 2023-01-17
Payer: MEDICARE

## 2023-01-17 ENCOUNTER — HOSPITAL ENCOUNTER (OUTPATIENT)
Dept: GENERAL RADIOLOGY | Age: 78
Discharge: HOME | End: 2023-01-19
Payer: MEDICARE

## 2023-01-17 DIAGNOSIS — Z01.818 PRE-OP EVALUATION: ICD-10-CM

## 2023-01-17 DIAGNOSIS — D47.2 MGUS (MONOCLONAL GAMMOPATHY OF UNKNOWN SIGNIFICANCE): ICD-10-CM

## 2023-01-17 DIAGNOSIS — E78.5 DYSLIPIDEMIA: ICD-10-CM

## 2023-01-17 DIAGNOSIS — R91.8 OPACITY OF LUNG ON IMAGING STUDY: ICD-10-CM

## 2023-01-17 DIAGNOSIS — E55.9 VITAMIN D DEFICIENCY: ICD-10-CM

## 2023-01-17 DIAGNOSIS — R73.01 IFG (IMPAIRED FASTING GLUCOSE): ICD-10-CM

## 2023-01-17 LAB
ABSOLUTE EOS #: 0.32 K/UL (ref 0–0.44)
ABSOLUTE IMMATURE GRANULOCYTE: <0.03 K/UL (ref 0–0.3)
ABSOLUTE LYMPH #: 1.27 K/UL (ref 1.1–3.7)
ABSOLUTE MONO #: 0.6 K/UL (ref 0.1–1.2)
ALBUMIN SERPL-MCNC: 4.1 G/DL (ref 3.5–5.2)
ALBUMIN/GLOBULIN RATIO: 1.2 (ref 1–2.5)
ALP BLD-CCNC: 51 U/L (ref 35–104)
ALT SERPL-CCNC: 16 U/L (ref 5–33)
ANION GAP SERPL CALCULATED.3IONS-SCNC: 11 MMOL/L (ref 9–17)
AST SERPL-CCNC: 19 U/L
BASOPHILS # BLD: 1 % (ref 0–2)
BASOPHILS ABSOLUTE: 0.04 K/UL (ref 0–0.2)
BILIRUB SERPL-MCNC: 0.3 MG/DL (ref 0.3–1.2)
BUN BLDV-MCNC: 25 MG/DL (ref 8–23)
BUN/CREAT BLD: 32 (ref 9–20)
CALCIUM SERPL-MCNC: 9.5 MG/DL (ref 8.6–10.4)
CHLORIDE BLD-SCNC: 100 MMOL/L (ref 98–107)
CHOLESTEROL/HDL RATIO: 4.9
CHOLESTEROL: 262 MG/DL
CO2: 24 MMOL/L (ref 20–31)
CREAT SERPL-MCNC: 0.77 MG/DL (ref 0.5–0.9)
EOSINOPHILS RELATIVE PERCENT: 5 % (ref 1–4)
FREE KAPPA/LAMBDA RATIO: 0.27 (ref 0.26–1.65)
GFR SERPL CREATININE-BSD FRML MDRD: >60 ML/MIN/1.73M2
GLUCOSE BLD-MCNC: 95 MG/DL (ref 70–99)
HCT VFR BLD CALC: 38.1 % (ref 36.3–47.1)
HDLC SERPL-MCNC: 54 MG/DL
HEMOGLOBIN: 12.5 G/DL (ref 11.9–15.1)
IGA: 116 MG/DL (ref 70–400)
IGG: 1521 MG/DL (ref 700–1600)
IGM: 134 MG/DL (ref 40–230)
IMMATURE GRANULOCYTES: 0 %
KAPPA FREE LIGHT CHAINS QNT: 1.46 MG/DL (ref 0.37–1.94)
LAMBDA FREE LIGHT CHAINS QNT: 5.5 MG/DL (ref 0.57–2.63)
LDL CHOLESTEROL: 163 MG/DL (ref 0–130)
LYMPHOCYTES # BLD: 19 % (ref 24–43)
MCH RBC QN AUTO: 28.7 PG (ref 25.2–33.5)
MCHC RBC AUTO-ENTMCNC: 32.8 G/DL (ref 25.2–33.5)
MCV RBC AUTO: 87.4 FL (ref 82.6–102.9)
MONOCYTES # BLD: 9 % (ref 3–12)
NRBC AUTOMATED: 0 PER 100 WBC
PDW BLD-RTO: 13.2 % (ref 11.8–14.4)
PLATELET # BLD: 248 K/UL (ref 138–453)
PMV BLD AUTO: 11.3 FL (ref 8.1–13.5)
POTASSIUM SERPL-SCNC: 4.8 MMOL/L (ref 3.7–5.3)
RBC # BLD: 4.36 M/UL (ref 3.95–5.11)
SEG NEUTROPHILS: 66 % (ref 36–65)
SEGMENTED NEUTROPHILS ABSOLUTE COUNT: 4.32 K/UL (ref 1.5–8.1)
SODIUM BLD-SCNC: 135 MMOL/L (ref 135–144)
TOTAL PROTEIN: 7.6 G/DL (ref 6.4–8.3)
TRIGL SERPL-MCNC: 223 MG/DL
VITAMIN D 25-HYDROXY: 27.8 NG/ML
WBC # BLD: 6.6 K/UL (ref 3.5–11.3)

## 2023-01-17 PROCEDURE — 36415 COLL VENOUS BLD VENIPUNCTURE: CPT

## 2023-01-17 PROCEDURE — 71046 X-RAY EXAM CHEST 2 VIEWS: CPT

## 2023-01-17 PROCEDURE — 80053 COMPREHEN METABOLIC PANEL: CPT

## 2023-01-17 PROCEDURE — 84155 ASSAY OF PROTEIN SERUM: CPT

## 2023-01-17 PROCEDURE — 86334 IMMUNOFIX E-PHORESIS SERUM: CPT

## 2023-01-17 PROCEDURE — 84165 PROTEIN E-PHORESIS SERUM: CPT

## 2023-01-17 PROCEDURE — 83036 HEMOGLOBIN GLYCOSYLATED A1C: CPT

## 2023-01-17 PROCEDURE — 82306 VITAMIN D 25 HYDROXY: CPT

## 2023-01-17 PROCEDURE — 83521 IG LIGHT CHAINS FREE EACH: CPT

## 2023-01-17 PROCEDURE — 82784 ASSAY IGA/IGD/IGG/IGM EACH: CPT

## 2023-01-17 PROCEDURE — 85025 COMPLETE CBC W/AUTO DIFF WBC: CPT

## 2023-01-17 PROCEDURE — 80061 LIPID PANEL: CPT

## 2023-01-18 LAB
ALBUMIN (CALCULATED): 4.5 G/DL (ref 3.2–5.2)
ALBUMIN PERCENT: 64 % (ref 45–65)
ALPHA 1 PERCENT: 2 % (ref 3–6)
ALPHA 2 PERCENT: 10 % (ref 6–13)
ALPHA-1-GLOBULIN: 0.1 G/DL (ref 0.1–0.4)
ALPHA-2-GLOBULIN: 0.7 G/DL (ref 0.5–0.9)
BETA GLOBULIN: 0.6 G/DL (ref 0.5–1.1)
BETA PERCENT: 9 % (ref 11–19)
ESTIMATED AVERAGE GLUCOSE: 123 MG/DL
GAMMA GLOBULIN %: 16 % (ref 9–20)
GAMMA GLOBULIN: 1.1 G/DL (ref 0.5–1.5)
HBA1C MFR BLD: 5.9 % (ref 4–6)
PATHOLOGIST: ABNORMAL
PATHOLOGIST: NORMAL
PROTEIN ELECTROPHORESIS, SERUM: ABNORMAL
SERUM IFX INTERP: NORMAL
TOTAL PROT. SUM,%: 101 % (ref 98–102)
TOTAL PROT. SUM: 7 G/DL (ref 6.3–8.2)
TOTAL PROTEIN: 7 G/DL (ref 6.4–8.3)

## 2023-01-30 ENCOUNTER — OFFICE VISIT (OUTPATIENT)
Dept: ONCOLOGY | Age: 78
End: 2023-01-30
Payer: MEDICARE

## 2023-01-30 VITALS
OXYGEN SATURATION: 97 % | WEIGHT: 210.2 LBS | HEIGHT: 63 IN | BODY MASS INDEX: 37.25 KG/M2 | SYSTOLIC BLOOD PRESSURE: 128 MMHG | DIASTOLIC BLOOD PRESSURE: 76 MMHG | HEART RATE: 71 BPM | TEMPERATURE: 96.4 F

## 2023-01-30 DIAGNOSIS — Z85.3 HISTORY OF BREAST CANCER: ICD-10-CM

## 2023-01-30 DIAGNOSIS — D47.2 MGUS (MONOCLONAL GAMMOPATHY OF UNKNOWN SIGNIFICANCE): Primary | ICD-10-CM

## 2023-01-30 PROCEDURE — G8417 CALC BMI ABV UP PARAM F/U: HCPCS | Performed by: INTERNAL MEDICINE

## 2023-01-30 PROCEDURE — G8427 DOCREV CUR MEDS BY ELIG CLIN: HCPCS | Performed by: INTERNAL MEDICINE

## 2023-01-30 PROCEDURE — G8483 FLU IMM NO ADMIN DOC REA: HCPCS | Performed by: INTERNAL MEDICINE

## 2023-01-30 PROCEDURE — 1123F ACP DISCUSS/DSCN MKR DOCD: CPT | Performed by: INTERNAL MEDICINE

## 2023-01-30 PROCEDURE — 1036F TOBACCO NON-USER: CPT | Performed by: INTERNAL MEDICINE

## 2023-01-30 PROCEDURE — 99214 OFFICE O/P EST MOD 30 MIN: CPT | Performed by: INTERNAL MEDICINE

## 2023-01-30 PROCEDURE — 99212 OFFICE O/P EST SF 10 MIN: CPT | Performed by: INTERNAL MEDICINE

## 2023-01-30 PROCEDURE — 1090F PRES/ABSN URINE INCON ASSESS: CPT | Performed by: INTERNAL MEDICINE

## 2023-01-30 PROCEDURE — G8399 PT W/DXA RESULTS DOCUMENT: HCPCS | Performed by: INTERNAL MEDICINE

## 2023-01-30 NOTE — PROGRESS NOTES
Tash Ramos                                                                                                                  1/30/2023  MRN:   [id number]  YOB: 1945  PCP:                           NICOLASA Guerra CNP  Referring Physician: Caleblageneuropathy  Treating Physician Name: Renae Andersen MD    Reason for visit:  Patient is instructed the clinic for a follow-up visit and to discuss further treatment plan    Current problems/ Active and recent treatments:  Left breast carcinoma, p T1b,p N0 ER positive HER-2 not amplified, surgery 2015  IgG G lambda paraproteinemia  Neuropathy    Summary of Case/History:  Tash Ramos a 65 y. o.female  initially seen in consultation by Dr. Isacc Fan for left-sided breast cancer. Patient underwent lumpectomy which revealed a 0.8 cm estrogen receptor positive HER-2 not amplified breast carcinoma, pT1b, pN0. Patient received radiation therapy. Subsequently she was started on anti-hormonal therapy however she had significant side effects. She was between different anti-hormonal therapy including tamoxifen however she continued to have severe side effects and subsequently decided to discontinue any anti-hormonal therapy. She discontinued anti-hormonal therapy in May 2016. Interim History:  Patient is returning for follow-up visit and to discuss lab results and further recommendations. She is scheduled to have mammogram next week. She denies any lump or mass in her breast.  She denies any chest pain shortness of breath. Denies any unintentional weight loss, drenching night sweats fever chills. She denied any unintentional weight loss, drenching night sweats fever chills. She did not have her scheduled mammograms last year so she is scheduled to have one now. She denies any new lumps, mass in her breast.        During this visit patient's allergy, social, medical, surgical history and medications were reviewed and updated.     Past Medical History:   Past Medical History:   Diagnosis Date    Breast CA ([facility]     2/15 s/p XRT & Dr Tonio Martinez following    Closed fracture of right foot with routine healing 09/2021    COPD (chronic obstructive pulmonary disease) ([facility] [address] 03/2004    mild obstructive defect on PFT's    Depression     Dyslipidemia     History of therapeutic radiation     Hyperplastic colon polyp     Colonoscopy 2/16/15 with repeat recommended 2/16/2020    IFG (impaired fasting glucose) 05/09/2017    Insomnia     MGUS (monoclonal gammopathy of unknown significance)     0.64 grams/decilier IgG lambda, 04/12. 1.) UPUP negative 04/12. Stable July 2014    Osteoarthritis     particularly in the hands    Osteopenia     DEXA, 01/11/04, T -1.7 spine, T -0.2  hip 1.) T -1.6 spine, T -0.2 hip, 05/07 2.) Repeat DEXA scan, 10/09, with -1.3 spine, T -0.1 hip. 3.) DEXA, 04/12, T -1.4 spine, T -0.0 hip.  4.) Treated with Fosamax x 8 yrs, discontinued 01/12 repeat DEXA 10/14 FRAX 2.4% 10 year risk at hip    Peripheral neuropathy     with mild to moderate sensorimotor peripheral polyneuropathy noted on EMG, 11/11    Vitamin D deficiency        Past Surgical History:     Past Surgical History:   Procedure Laterality Date    APPENDECTOMY      BACK SURGERY  10/11/2019    BREAST BIOPSY Left 11/12/2014    US guided brest bx - benign    BREAST LUMPECTOMY Left 02/16/2015    with needle placement- MUCINOUS CARCINOMA- shw    COLONOSCOPY  02/28/2003    normal-reed    COLONOSCOPY  10/30/1998    normal-reed    COLONOSCOPY  2008    normal, family hx colon cancer- al-jadda    COLONOSCOPY  02/2015    6 cm polyp-benign, repeat 5yrs- dennis    COLONOSCOPY N/A 11/09/2020    diverticulosis and hyperplastic polyp x1; Dr. Bisi Avilez at [address] Right 1990's, 2002    [place]     HYSTERECTOMY (CERVIX STATUS UNKNOWN)      WITH BILATERAL SALPINGO OOPHORECTOMY    KNEE ARTHROSCOPY  2011    RIGHT KNEE    LASIK Bilateral     LUMBAR SPINE SURGERY Right 06/21/2019 Right L4 L5 TRANSFORAMINAL performed by Saad Kirby MD at [address] [place] [address] Dr Right 07/23/2019    Right L4 & L5 TRANSFORAMINAL performed by Saad Kirby MD at [address] Left 03/04/2015    left sentinal node biopsy    OVARY REMOVAL      TOTAL KNEE ARTHROPLASTY Left 04/27/2022    TUBAL LIGATION  08/1974       Patient Family Social History:     Social History     Social History Narrative    She works ar [employer] on [address]. 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 She has 2 adult sons, 4 grandkids. Current Medications:     Current Outpatient Medications   Medication Sig Dispense Refill    VITAMIN D PO Take 4,000 Units by mouth daily      zaleplon (SONATA) 10 MG capsule Take 1 capsule by mouth nightly as needed (sleep). 90 capsule 0    aspirin 81 MG EC tablet Take 81 mg by mouth daily      BLACK COHOSH PO Take 1 tablet by mouth at bedtime      oxaprozin (DAYPRO) 600 MG tablet Take 1 tablet by mouth daily as needed (pain) 30 tablet 3    omeprazole (PRILOSEC) 20 MG delayed release capsule Take 1 capsule by mouth daily 30 capsule 0    diclofenac sodium (VOLTAREN) 1 % GEL Apply topically nightly as needed for Pain 350 g 0    fluticasone (FLONASE) 50 MCG/ACT nasal spray 1 spray by Nasal route daily 1 each 3    escitalopram (LEXAPRO) 10 MG tablet Take 1 tablet by mouth daily 90 tablet 3    ibuprofen (ADVIL;MOTRIN) 200 MG tablet Take 200 mg by mouth every 6 hours as needed for Pain      Calcium Citrate-Vitamin D (CALCIUM CITRATE + D PO) Take 1 tablet by mouth daily      Handicap Placard MISC by Does not apply route The disability is expected to last 9/5/2024 1 each 0    magnesium (MAGNESIUM-OXIDE) 250 MG TABS tablet Take 250 mg by mouth daily      B Complex Vitamins (VITAMIN B COMPLEX PO) Take by mouth daily       No current facility-administered medications for this visit.        Allergies: Atorvastatin, Crestor [rosuvastatin], Cymbalta [duloxetine hcl], Influenza vaccines, Lipitor, Meloxicam, Pravastatin, Prednisone, Red yeast rice [monascus purpureus went yeast], Zetia [ezetimibe], Zocor [simvastatin], Floxin [ofloxacin], and Gabapentin    Review of Systems:    Constitutional: No fever or chills. No night sweats, no weight loss   Eyes: No eye discharge, double vision, or eye pain   HEENT: negative for sore mouth, sore throat, hoarseness and voice change   Respiratory: negative for cough , sputum, dyspnea, wheezing, hemoptysis, chest pain   Cardiovascular: negative for chest pain, dyspnea, palpitations, orthopnea, PND   Gastrointestinal: negative for nausea, vomiting, diarrhea, constipation, abdominal pain, Dysphagia, hematemesis and hematochezia   Genitourinary: negative for frequency, dysuria, nocturia, urinary incontinence, and hematuria   Integument: negative for rash, skin lesions, bruises.    Hematologic/Lymphatic: negative for easy bruising, bleeding, lymphadenopathy, or petechiae   Endocrine: negative for heat or cold intolerance,weight changes, change in bowel habits and hair loss   Musculoskeletal: negative for myalgias, arthralgias, pain, joint swelling,and bone pain   Neurological: negative for headaches, dizziness, seizures, weakness, numbness        Physical Exam:    Vitals: /76   Pulse 71   Temp (!) 96.4 °F (35.8 °C)   Ht 5' 3\" (1.6 m)   Wt 210 lb 3.2 oz (95.3 kg)   SpO2 97%   BMI 37.24 kg/m²   General appearance - well appearing, no in pain or distress  Mental status - AAO X3  Eyes - pupils equal and reactive, extraocular eye movements intact  Mouth - mucous membranes moist, pharynx normal without lesions  Neck - supple, no significant adenopat  Lymphatics - no palpable lymphadenopathy, no hepatosplenomegaly  Chest - clear to auscultation, no wheezes, rales or rhonchi, symmetric air entry  Heart - normal rate, regular rhythm, normal S1, S2, no murmurs  Abdomen - soft, nontender, nondistended, no masses or organomegaly  Neurological - alert, oriented, normal speech, no focal findings or movement disorder noted  Extremities - peripheral pulses normal, no pedal edema, no clubbing or cyanosis  Skin - normal coloration and turgor, no rashes, no suspicious skin lesions noted   breast-bilateral breast examination was performed in the presence of a female nurse. Does not reveal any palpable mass or skin abnormality other than healed scar from previous surgery. No lymphadenopathy noted in bilateral axilla     DATA:    CBC:   No results for input(s): WBC, HGB, PLT in the last 72 hours. BMP:    No results for input(s): NA, K, CL, CO2, BUN, CREATININE, GLUCOSE in the last 72 hours. Hepatic:   No results for input(s): AST, ALT, ALB, BILITOT, ALKPHOS in the last 72 hours.   Results for orders placed or performed during the hospital encounter of 01/17/23   Immunoglobulin Panel (IgG, IgA, IgM)   Result Value Ref Range    IgG 1521 700 - 1600 mg/dL    IgA 116 70 - 400 mg/dL    IgM 134 40 - 230 mg/dL   Comprehensive Metabolic Panel   Result Value Ref Range    Glucose 95 70 - 99 mg/dL    BUN 25 (H) 8 - 23 mg/dL    Creatinine 0.77 0.50 - 0.90 mg/dL    Est, Glom Filt Rate >60 >60 mL/min/1.73m2    Bun/Cre Ratio 32 (H) 9 - 20    Calcium 9.5 8.6 - 10.4 mg/dL    Sodium 135 135 - 144 mmol/L    Potassium 4.8 3.7 - 5.3 mmol/L    Chloride 100 98 - 107 mmol/L    CO2 24 20 - 31 mmol/L    Anion Gap 11 9 - 17 mmol/L    Alkaline Phosphatase 51 35 - 104 U/L    ALT 16 5 - 33 U/L    AST 19 <32 U/L    Total Bilirubin 0.3 0.3 - 1.2 mg/dL    Total Protein 7.6 6.4 - 8.3 g/dL    Albumin 4.1 3.5 - 5.2 g/dL    Albumin/Globulin Ratio 1.2 1.0 - 2.5   CBC Auto Differential   Result Value Ref Range    WBC 6.6 3.5 - 11.3 k/uL    RBC 4.36 3.95 - 5.11 m/uL    Hemoglobin 12.5 11.9 - 15.1 g/dL    Hematocrit 38.1 36.3 - 47.1 %    MCV 87.4 82.6 - 102.9 fL    MCH 28.7 25.2 - 33.5 pg    MCHC 32.8 25.2 - 33.5 g/dL    RDW 13.2 11.8 - 14.4 %    Platelets 438 587 - 054 k/uL    MPV 11.3 8.1 - 13.5 fL    NRBC Automated 0.0 0.0 per 100 WBC    Seg Neutrophils 66 (H) 36 - 65 %    Lymphocytes 19 (L) 24 - 43 %    Monocytes 9 3 - 12 %    Eosinophils % 5 (H) 1 - 4 %    Basophils 1 0 - 2 %    Immature Granulocytes 0 0 %    Segs Absolute 4.32 1.50 - 8.10 k/uL    Absolute Lymph # 1.27 1.10 - 3.70 k/uL    Absolute Mono # 0.60 0.10 - 1.20 k/uL    Absolute Eos # 0.32 0.00 - 0.44 k/uL    Basophils Absolute 0.04 0.00 - 0.20 k/uL    Absolute Immature Granulocyte <0.03 0.00 - 0.30 k/uL   Theresa/Lambda Free Lt Chains, Serum Quant   Result Value Ref Range    Kappa Free Light Chains QNT 1.46 0.37 - 1.94 mg/dL    Lambda Free Light Chains QNT 5.50 (H) 0.57 - 2.63 mg/dL    Free Kappa/Lambda Ratio 0.27 0.26 - 1.65   Electrophoresis Protein, Serum without Reflex to Immunofixation   Result Value Ref Range    Total Protein 7.0 6.4 - 8.3 g/dL    Albumin (calculated) 4.5 3.2 - 5.2 g/dL    Albumin % 64 45 - 65 %    Alpha-1-Globulin 0.1 0.1 - 0.4 g/dL    Alpha 1 % 2 (L) 3 - 6 %    Alpha-2-Globulin 0.7 0.5 - 0.9 g/dL    Alpha 2 % 10 6 - 13 %    Beta Globulin 0.6 0.5 - 1.1 g/dL    Beta Percent 9 (L) 11 - 19 %    Gamma Globulin 1.1 0.5 - 1.5 g/dL    Gamma Globulin % 16 9 - 20 %    Total Prot. Sum 7.0 6.3 - 8.2 g/dL    Total Prot. Sum,% 101 98 - 102 %    Protein Electrophoresis, Serum       A ZONE OF RESTRICTION IS PRESENT IN THE GAMMAGLOBULIN REGION. CONFIRMED BY IMMUNOFIXATION TO BE MONOCLONAL    Pathologist ELECTRONICALLY SIGNED. Blanca Bergman M.D. Immunofixation serum profile   Result Value Ref Range    Serum IFX Interp       A ZONE OF RESTRICTION IS PRESENT IN THE GAMMAGLOBULIN REGION. CONFIRMED BY IMMUNOFIXATION TO BE MONOCLONAL    Pathologist ELECTRONICALLY SIGNED. Blanca Bergman M.D. Mammogram       Impression   No mammographic evidence of malignancy. BIRADS:   BIRADS - CATEGORY 2       Benign, no evidence of malignancy.   Normal interval follow-up is recommended   in 12 months. OVER ALL ASSESSMENT - BENIGN       A letter of notification will be sent to the patient regarding the results. Impression:  Invasive infiltrating ductal carcinoma of left breast status post lumpectomy and adjuvant radiation therapy. Only received 1 year of anti-hormonal therapy. IgG lambda paraproteinemia  neuropathy    Plan:  I reviewed her recent lab work, discussed diagnosis and treatment recommendations  Her monoclonal protein appears stable  For her breast cancer she will be followed with annual mammogram  Her next mammogram is scheduled next week  No indication for bone marrow biopsy  Return to clinic in 1 year with labs 1 week prior          MD Travis Umana MD  Hematologist/Medical Oncologist    On this date 1/30/23  I have spent 40 minutes reviewing previous notes, test results and face to face with the patient discussing the diagnosis and importance of compliance with the treatment plan. Greater than 50% of that time was spent face-to-face with the patient in counseling and coordinating her care. This note is created with the assistance of a speech recognition program.  While intending to generate a document that actually reflects the content of the visit, the document can still have some errors including those of syntax and sound a like substitutions which may escape proof reading. It such instances, actual meaning can be extrapolated by contextual diversion.

## 2023-02-07 ENCOUNTER — HOSPITAL ENCOUNTER (OUTPATIENT)
Dept: MAMMOGRAPHY | Age: 78
Discharge: HOME | End: 2023-02-09
Payer: MEDICARE

## 2023-02-07 DIAGNOSIS — Z12.31 OTHER SCREENING MAMMOGRAM: ICD-10-CM

## 2023-02-07 PROCEDURE — 77067 SCR MAMMO BI INCL CAD: CPT

## 2023-03-09 DIAGNOSIS — G47.9 SLEEP DISORDER: ICD-10-CM

## 2023-03-09 NOTE — TELEPHONE ENCOUNTER
Refill request     Medication pended if agreeable    Last Appt:  10/18/2022  Next Appt:   4/18/2023  Med verified in Epic

## 2023-03-10 RX ORDER — ZALEPLON 10 MG/1
10 CAPSULE ORAL NIGHTLY PRN
Qty: 90 CAPSULE | Refills: 0 | Status: SHIPPED | OUTPATIENT
Start: 2023-03-10 | End: 2024-03-09

## 2023-04-18 DIAGNOSIS — F32.9 CHRONIC MAJOR DEPRESSIVE DISORDER: ICD-10-CM

## 2023-04-18 RX ORDER — ESCITALOPRAM OXALATE 10 MG/1
10 TABLET ORAL DAILY
Qty: 90 TABLET | Refills: 0 | Status: SHIPPED | OUTPATIENT
Start: 2023-04-18

## 2023-04-18 NOTE — TELEPHONE ENCOUNTER
----- Message from Sage Alarconcal sent at 4/18/2023 12:03 PM EDT -----  Subject: Refill Request    QUESTIONS  Name of Medication? escitalopram (LEXAPRO) 10 MG tablet  Patient-reported dosage and instructions? once daily  How many days do you have left? 7  Preferred Pharmacy? [facility] ([address] )  Pharmacy phone number (if available)? [phone]  Additional Information for Provider? Patient has new insurance, needs new   prescription   ---------------------------------------------------------------------------  --------------  CALL BACK INFO  What is the best way for the office to contact you? OK to leave message on   voicemail  Preferred Call Back Phone Number? [phone]  ---------------------------------------------------------------------------  --------------  SCRIPT ANSWERS  Relationship to Patient?  Self

## 2023-06-24 DIAGNOSIS — F32.9 CHRONIC MAJOR DEPRESSIVE DISORDER: ICD-10-CM

## 2023-06-26 RX ORDER — ESCITALOPRAM OXALATE 10 MG/1
10 TABLET ORAL DAILY
Qty: 90 TABLET | Refills: 3 | Status: SHIPPED | OUTPATIENT
Start: 2023-06-26

## 2023-06-27 ENCOUNTER — HOSPITAL ENCOUNTER (OUTPATIENT)
Dept: GENERAL RADIOLOGY | Age: 78
Discharge: HOME | End: 2023-06-29
Payer: MEDICARE

## 2023-06-27 ENCOUNTER — OFFICE VISIT (OUTPATIENT)
Dept: INTERNAL MEDICINE | Age: 78
End: 2023-06-27
Payer: MEDICARE

## 2023-06-27 VITALS
RESPIRATION RATE: 16 BRPM | BODY MASS INDEX: 36.86 KG/M2 | DIASTOLIC BLOOD PRESSURE: 70 MMHG | SYSTOLIC BLOOD PRESSURE: 120 MMHG | WEIGHT: 208 LBS | HEIGHT: 63 IN | HEART RATE: 60 BPM

## 2023-06-27 DIAGNOSIS — G89.29 CHRONIC PAIN OF RIGHT KNEE: ICD-10-CM

## 2023-06-27 DIAGNOSIS — G47.9 SLEEP DISORDER: ICD-10-CM

## 2023-06-27 DIAGNOSIS — D47.2 MGUS (MONOCLONAL GAMMOPATHY OF UNKNOWN SIGNIFICANCE): ICD-10-CM

## 2023-06-27 DIAGNOSIS — M85.80 OSTEOPENIA, UNSPECIFIED LOCATION: ICD-10-CM

## 2023-06-27 DIAGNOSIS — R73.01 IFG (IMPAIRED FASTING GLUCOSE): ICD-10-CM

## 2023-06-27 DIAGNOSIS — M15.9 OSTEOARTHRITIS OF MULTIPLE JOINTS, UNSPECIFIED OSTEOARTHRITIS TYPE: ICD-10-CM

## 2023-06-27 DIAGNOSIS — M19.09 PRIMARY OSTEOARTHRITIS, OTHER SPECIFIED SITE: ICD-10-CM

## 2023-06-27 DIAGNOSIS — E55.9 VITAMIN D DEFICIENCY: ICD-10-CM

## 2023-06-27 DIAGNOSIS — J30.2 SEASONAL ALLERGIES: ICD-10-CM

## 2023-06-27 DIAGNOSIS — E78.5 DYSLIPIDEMIA: ICD-10-CM

## 2023-06-27 DIAGNOSIS — Z85.3 HISTORY OF BREAST CANCER: ICD-10-CM

## 2023-06-27 DIAGNOSIS — M47.816 LUMBAR SPONDYLOSIS: ICD-10-CM

## 2023-06-27 DIAGNOSIS — M25.561 CHRONIC PAIN OF RIGHT KNEE: ICD-10-CM

## 2023-06-27 DIAGNOSIS — M19.042 PRIMARY OSTEOARTHRITIS OF BOTH HANDS: ICD-10-CM

## 2023-06-27 DIAGNOSIS — J43.1 PANLOBULAR EMPHYSEMA (HCC): ICD-10-CM

## 2023-06-27 DIAGNOSIS — G62.9 PERIPHERAL POLYNEUROPATHY: ICD-10-CM

## 2023-06-27 DIAGNOSIS — F32.9 CHRONIC MAJOR DEPRESSIVE DISORDER: Primary | ICD-10-CM

## 2023-06-27 DIAGNOSIS — M19.041 PRIMARY OSTEOARTHRITIS OF BOTH HANDS: ICD-10-CM

## 2023-06-27 PROCEDURE — G8427 DOCREV CUR MEDS BY ELIG CLIN: HCPCS | Performed by: NURSE PRACTITIONER

## 2023-06-27 PROCEDURE — 73562 X-RAY EXAM OF KNEE 3: CPT

## 2023-06-27 PROCEDURE — G8417 CALC BMI ABV UP PARAM F/U: HCPCS | Performed by: NURSE PRACTITIONER

## 2023-06-27 PROCEDURE — 3023F SPIROM DOC REV: CPT | Performed by: NURSE PRACTITIONER

## 2023-06-27 PROCEDURE — 99214 OFFICE O/P EST MOD 30 MIN: CPT | Performed by: NURSE PRACTITIONER

## 2023-06-27 PROCEDURE — 1090F PRES/ABSN URINE INCON ASSESS: CPT | Performed by: NURSE PRACTITIONER

## 2023-06-27 PROCEDURE — G8399 PT W/DXA RESULTS DOCUMENT: HCPCS | Performed by: NURSE PRACTITIONER

## 2023-06-27 PROCEDURE — 1036F TOBACCO NON-USER: CPT | Performed by: NURSE PRACTITIONER

## 2023-06-27 PROCEDURE — 1123F ACP DISCUSS/DSCN MKR DOCD: CPT | Performed by: NURSE PRACTITIONER

## 2023-06-27 RX ORDER — ZALEPLON 10 MG/1
10 CAPSULE ORAL NIGHTLY PRN
Qty: 90 CAPSULE | Refills: 0 | Status: SHIPPED | OUTPATIENT
Start: 2023-06-27 | End: 2024-06-26

## 2023-06-27 RX ORDER — NAPROXEN 250 MG/1
250 TABLET ORAL DAILY
COMMUNITY

## 2023-06-27 SDOH — ECONOMIC STABILITY: FOOD INSECURITY: WITHIN THE PAST 12 MONTHS, YOU WORRIED THAT YOUR FOOD WOULD RUN OUT BEFORE YOU GOT MONEY TO BUY MORE.: PATIENT DECLINED

## 2023-06-27 SDOH — ECONOMIC STABILITY: INCOME INSECURITY: HOW HARD IS IT FOR YOU TO PAY FOR THE VERY BASICS LIKE FOOD, HOUSING, MEDICAL CARE, AND HEATING?: PATIENT DECLINED

## 2023-06-27 SDOH — ECONOMIC STABILITY: HOUSING INSECURITY
IN THE LAST 12 MONTHS, WAS THERE A TIME WHEN YOU DID NOT HAVE A STEADY PLACE TO SLEEP OR SLEPT IN A SHELTER (INCLUDING NOW)?: PATIENT REFUSED

## 2023-06-27 SDOH — ECONOMIC STABILITY: FOOD INSECURITY: WITHIN THE PAST 12 MONTHS, THE FOOD YOU BOUGHT JUST DIDN'T LAST AND YOU DIDN'T HAVE MONEY TO GET MORE.: PATIENT DECLINED

## 2023-06-27 ASSESSMENT — PATIENT HEALTH QUESTIONNAIRE - PHQ9
SUM OF ALL RESPONSES TO PHQ QUESTIONS 1-9: 3
10. IF YOU CHECKED OFF ANY PROBLEMS, HOW DIFFICULT HAVE THESE PROBLEMS MADE IT FOR YOU TO DO YOUR WORK, TAKE CARE OF THINGS AT HOME, OR GET ALONG WITH OTHER PEOPLE: 0
8. MOVING OR SPEAKING SO SLOWLY THAT OTHER PEOPLE COULD HAVE NOTICED. OR THE OPPOSITE, BEING SO FIGETY OR RESTLESS THAT YOU HAVE BEEN MOVING AROUND A LOT MORE THAN USUAL: 0
5. POOR APPETITE OR OVEREATING: 0
6. FEELING BAD ABOUT YOURSELF - OR THAT YOU ARE A FAILURE OR HAVE LET YOURSELF OR YOUR FAMILY DOWN: 0
4. FEELING TIRED OR HAVING LITTLE ENERGY: 1
SUM OF ALL RESPONSES TO PHQ QUESTIONS 1-9: 3
1. LITTLE INTEREST OR PLEASURE IN DOING THINGS: 1
9. THOUGHTS THAT YOU WOULD BE BETTER OFF DEAD, OR OF HURTING YOURSELF: 0
SUM OF ALL RESPONSES TO PHQ QUESTIONS 1-9: 3
2. FEELING DOWN, DEPRESSED OR HOPELESS: 0
3. TROUBLE FALLING OR STAYING ASLEEP: 1
7. TROUBLE CONCENTRATING ON THINGS, SUCH AS READING THE NEWSPAPER OR WATCHING TELEVISION: 0
SUM OF ALL RESPONSES TO PHQ9 QUESTIONS 1 & 2: 1
SUM OF ALL RESPONSES TO PHQ QUESTIONS 1-9: 3

## 2023-06-28 ASSESSMENT — ENCOUNTER SYMPTOMS
COUGH: 0
WHEEZING: 0
TROUBLE SWALLOWING: 0
DIARRHEA: 0
EYE PAIN: 0
VOMITING: 0
NAUSEA: 0
ABDOMINAL PAIN: 0
SINUS PRESSURE: 0
CONSTIPATION: 0
SORE THROAT: 0
CHEST TIGHTNESS: 0
SHORTNESS OF BREATH: 0
RHINORRHEA: 0
COLOR CHANGE: 0
BLOOD IN STOOL: 0
FACIAL SWELLING: 0

## 2023-07-12 DIAGNOSIS — F32.9 CHRONIC MAJOR DEPRESSIVE DISORDER: ICD-10-CM

## 2023-07-12 RX ORDER — ESCITALOPRAM OXALATE 10 MG/1
10 TABLET ORAL DAILY
Qty: 90 TABLET | Refills: 3 | Status: SHIPPED | OUTPATIENT
Start: 2023-07-12

## 2023-07-12 NOTE — TELEPHONE ENCOUNTER
Refill request     Patient states she called [facility] and they are stating that they never received rx. Patient requesting new refill to be sent.      Medication pended if agreeable     Last Appt:  6/27/2023  Next Appt:   Visit date not found  Med verified in [address] [place] [address]

## 2023-09-18 DIAGNOSIS — G47.9 SLEEP DISORDER: ICD-10-CM

## 2023-09-18 RX ORDER — ZALEPLON 10 MG/1
10 CAPSULE ORAL NIGHTLY PRN
Qty: 90 CAPSULE | Refills: 0 | Status: SHIPPED | OUTPATIENT
Start: 2023-09-18 | End: 2024-09-17

## 2023-12-14 ENCOUNTER — HOSPITAL ENCOUNTER (OUTPATIENT)
Age: 78
Discharge: HOME | End: 2023-12-14
Payer: MEDICARE

## 2023-12-14 DIAGNOSIS — E55.9 VITAMIN D DEFICIENCY: ICD-10-CM

## 2023-12-14 DIAGNOSIS — M19.09 PRIMARY OSTEOARTHRITIS, OTHER SPECIFIED SITE: ICD-10-CM

## 2023-12-14 DIAGNOSIS — M15.9 OSTEOARTHRITIS OF MULTIPLE JOINTS, UNSPECIFIED OSTEOARTHRITIS TYPE: ICD-10-CM

## 2023-12-14 DIAGNOSIS — E78.5 DYSLIPIDEMIA: ICD-10-CM

## 2023-12-14 DIAGNOSIS — R73.01 IFG (IMPAIRED FASTING GLUCOSE): ICD-10-CM

## 2023-12-14 LAB
25(OH)D3 SERPL-MCNC: 41.7 NG/ML
CHOLEST SERPL-MCNC: 266 MG/DL
CHOLESTEROL/HDL RATIO: 4.8
CRP SERPL HS-MCNC: <3 MG/L (ref 0–5)
ERYTHROCYTE [SEDIMENTATION RATE] IN BLOOD BY PHOTOMETRIC METHOD: 7 MM/HR (ref 0–30)
EST. AVERAGE GLUCOSE BLD GHB EST-MCNC: 117 MG/DL
HBA1C MFR BLD: 5.7 % (ref 4–6)
HDLC SERPL-MCNC: 56 MG/DL
LDLC SERPL CALC-MCNC: 167 MG/DL (ref 0–130)
RHEUMATOID FACT SER NEPH-ACNC: 14.5 IU/ML
TRIGL SERPL-MCNC: 216 MG/DL

## 2023-12-14 PROCEDURE — 86235 NUCLEAR ANTIGEN ANTIBODY: CPT

## 2023-12-14 PROCEDURE — 86431 RHEUMATOID FACTOR QUANT: CPT

## 2023-12-14 PROCEDURE — 80061 LIPID PANEL: CPT

## 2023-12-14 PROCEDURE — 82306 VITAMIN D 25 HYDROXY: CPT

## 2023-12-14 PROCEDURE — 86140 C-REACTIVE PROTEIN: CPT

## 2023-12-14 PROCEDURE — 83036 HEMOGLOBIN GLYCOSYLATED A1C: CPT

## 2023-12-14 PROCEDURE — 36415 COLL VENOUS BLD VENIPUNCTURE: CPT

## 2023-12-14 PROCEDURE — 85652 RBC SED RATE AUTOMATED: CPT

## 2023-12-28 ENCOUNTER — TELEPHONE (OUTPATIENT)
Dept: INTERNAL MEDICINE | Age: 78
End: 2023-12-28

## 2023-12-28 DIAGNOSIS — M05.80 POLYARTHRITIS WITH POSITIVE RHEUMATOID FACTOR (HCC): Primary | ICD-10-CM

## 2023-12-28 NOTE — TELEPHONE ENCOUNTER
Received referral form from [facility]. They are requiring additional testing prior to scheduling for RA referrals as follows: Anti-CCP (Cyclic citrullinated peptide)    Other testing required already complete: RF, ESR, CRP, Imaging studies    Please advise. Order pended.

## 2024-01-04 ENCOUNTER — HOSPITAL ENCOUNTER (OUTPATIENT)
Dept: NON INVASIVE DIAGNOSTICS | Age: 79
Discharge: HOME | End: 2024-01-04
Payer: MEDICARE

## 2024-01-04 ENCOUNTER — HOSPITAL ENCOUNTER (OUTPATIENT)
Age: 79
Discharge: HOME | End: 2024-01-04
Payer: MEDICARE

## 2024-01-04 ENCOUNTER — HOSPITAL ENCOUNTER (OUTPATIENT)
Dept: GENERAL RADIOLOGY | Age: 79
Discharge: HOME | End: 2024-01-06
Payer: MEDICARE

## 2024-01-04 DIAGNOSIS — Z01.818 PRE-OP EVALUATION: ICD-10-CM

## 2024-01-04 DIAGNOSIS — M05.80 POLYARTHRITIS WITH POSITIVE RHEUMATOID FACTOR (HCC): ICD-10-CM

## 2024-01-04 LAB
ANION GAP SERPL CALCULATED.3IONS-SCNC: 8 MMOL/L (ref 9–17)
BACTERIA URNS QL MICRO: ABNORMAL
BASOPHILS # BLD: 0.05 K/UL (ref 0–0.2)
BASOPHILS NFR BLD: 1 % (ref 0–2)
BILIRUB UR QL STRIP: ABNORMAL
BUN SERPL-MCNC: 25 MG/DL (ref 8–23)
BUN/CREAT SERPL: 25 (ref 9–20)
CALCIUM SERPL-MCNC: 9.4 MG/DL (ref 8.6–10.4)
CASTS #/AREA URNS LPF: ABNORMAL /LPF (ref 0–2)
CASTS #/AREA URNS LPF: ABNORMAL /LPF (ref 0–2)
CHARACTER UR: ABNORMAL
CHLORIDE SERPL-SCNC: 103 MMOL/L (ref 98–107)
CLARITY UR: ABNORMAL
CO2 SERPL-SCNC: 27 MMOL/L (ref 20–31)
COLOR UR: YELLOW
CREAT SERPL-MCNC: 1 MG/DL (ref 0.5–0.9)
EOSINOPHIL # BLD: 0.09 K/UL (ref 0–0.44)
EOSINOPHILS RELATIVE PERCENT: 1 % (ref 1–4)
EPI CELLS #/AREA URNS HPF: ABNORMAL /HPF (ref 0–5)
ERYTHROCYTE [DISTWIDTH] IN BLOOD BY AUTOMATED COUNT: 13.8 % (ref 11.8–14.4)
GFR SERPL CREATININE-BSD FRML MDRD: 57 ML/MIN/1.73M2
GLUCOSE SERPL-MCNC: 91 MG/DL (ref 70–99)
GLUCOSE UR STRIP-MCNC: NEGATIVE MG/DL
HCT VFR BLD AUTO: 38.4 % (ref 36.3–47.1)
HGB BLD-MCNC: 12.3 G/DL (ref 11.9–15.1)
HGB UR QL STRIP.AUTO: NEGATIVE
IMM GRANULOCYTES # BLD AUTO: 0.03 K/UL (ref 0–0.3)
IMM GRANULOCYTES NFR BLD: 0 %
KETONES UR STRIP-MCNC: ABNORMAL MG/DL
LEUKOCYTE ESTERASE UR QL STRIP: NEGATIVE
LYMPHOCYTES NFR BLD: 1.68 K/UL (ref 1.1–3.7)
LYMPHOCYTES RELATIVE PERCENT: 21 % (ref 24–43)
MCH RBC QN AUTO: 29.1 PG (ref 25.2–33.5)
MCHC RBC AUTO-ENTMCNC: 32 G/DL (ref 25.2–33.5)
MCV RBC AUTO: 91 FL (ref 82.6–102.9)
MONOCYTES NFR BLD: 0.75 K/UL (ref 0.1–1.2)
MONOCYTES NFR BLD: 9 % (ref 3–12)
MUCOUS THREADS URNS QL MICRO: ABNORMAL
NEUTROPHILS NFR BLD: 68 % (ref 36–65)
NEUTS SEG NFR BLD: 5.36 K/UL (ref 1.5–8.1)
NITRITE UR QL STRIP: NEGATIVE
NRBC BLD-RTO: 0 PER 100 WBC
PH UR STRIP: 6 [PH] (ref 5–6)
PLATELET # BLD AUTO: 274 K/UL (ref 138–453)
PMV BLD AUTO: 10.5 FL (ref 8.1–13.5)
POTASSIUM SERPL-SCNC: 4.7 MMOL/L (ref 3.7–5.3)
PROT UR STRIP-MCNC: NEGATIVE MG/DL
RBC # BLD AUTO: 4.22 M/UL (ref 3.95–5.11)
RBC #/AREA URNS HPF: ABNORMAL /HPF (ref 0–4)
SODIUM SERPL-SCNC: 138 MMOL/L (ref 135–144)
SP GR UR STRIP: 1.03 (ref 1.01–1.02)
UROBILINOGEN UR STRIP-ACNC: NORMAL EU/DL (ref 0–1)
WBC #/AREA URNS HPF: ABNORMAL /HPF (ref 0–4)
WBC OTHER # BLD: 8 K/UL (ref 3.5–11.3)

## 2024-01-04 PROCEDURE — 81001 URINALYSIS AUTO W/SCOPE: CPT

## 2024-01-04 PROCEDURE — 93005 ELECTROCARDIOGRAM TRACING: CPT

## 2024-01-04 PROCEDURE — 86200 CCP ANTIBODY: CPT

## 2024-01-04 PROCEDURE — 71046 X-RAY EXAM CHEST 2 VIEWS: CPT

## 2024-01-04 PROCEDURE — 80048 BASIC METABOLIC PNL TOTAL CA: CPT

## 2024-01-04 PROCEDURE — 85025 COMPLETE CBC W/AUTO DIFF WBC: CPT

## 2024-01-04 PROCEDURE — 36415 COLL VENOUS BLD VENIPUNCTURE: CPT

## 2024-01-05 DIAGNOSIS — R82.90 ABNORMAL FINDING ON URINALYSIS: Primary | ICD-10-CM

## 2024-01-05 LAB
EKG ATRIAL RATE: 62 BPM
EKG P AXIS: 70 DEGREES
EKG P-R INTERVAL: 134 MS
EKG Q-T INTERVAL: 428 MS
EKG QRS DURATION: 68 MS
EKG QTC CALCULATION (BAZETT): 434 MS
EKG R AXIS: 26 DEGREES
EKG T AXIS: 62 DEGREES
EKG VENTRICULAR RATE: 62 BPM

## 2024-01-06 LAB — CCP AB SER IA-ACNC: 3 U/ML (ref 0–7)

## 2024-01-08 ENCOUNTER — HOSPITAL ENCOUNTER (OUTPATIENT)
Age: 79
Discharge: HOME | End: 2024-01-08
Payer: MEDICARE

## 2024-01-08 DIAGNOSIS — R82.90 ABNORMAL FINDING ON URINALYSIS: ICD-10-CM

## 2024-01-08 LAB
BILIRUB UR QL STRIP: NEGATIVE
CLARITY UR: CLEAR
COLOR UR: YELLOW
COMMENT: ABNORMAL
GLUCOSE UR STRIP-MCNC: NEGATIVE MG/DL
HGB UR QL STRIP.AUTO: NEGATIVE
KETONES UR STRIP-MCNC: NEGATIVE MG/DL
LEUKOCYTE ESTERASE UR QL STRIP: NEGATIVE
NITRITE UR QL STRIP: NEGATIVE
PH UR STRIP: 6.5 [PH] (ref 5–6)
PROT UR STRIP-MCNC: NEGATIVE MG/DL
SP GR UR STRIP: 1.02 (ref 1.01–1.02)
UROBILINOGEN UR STRIP-ACNC: NORMAL EU/DL (ref 0–1)

## 2024-01-08 PROCEDURE — 81003 URINALYSIS AUTO W/O SCOPE: CPT

## 2024-01-10 ENCOUNTER — TELEPHONE (OUTPATIENT)
Dept: INTERNAL MEDICINE | Age: 79
End: 2024-01-10

## 2024-01-30 ENCOUNTER — TELEPHONE (OUTPATIENT)
Dept: INTERNAL MEDICINE | Age: 79
End: 2024-01-30

## 2024-01-30 NOTE — TELEPHONE ENCOUNTER
I can take out however we need to make sure her surgeon is ok with this- please reach out to their office for documentation

## 2024-01-30 NOTE — TELEPHONE ENCOUNTER
[facility] f/u appt for 2/6/24. She is asking if Rosa can take the sola out of her knee then so she does not have to travel back to [facility] just to have Dr Matthews's nurse remove them. As long as the knee is good, she does not have to f/u with him. Please advise and call patient back @[phone]

## 2024-02-05 ENCOUNTER — TELEPHONE (OUTPATIENT)
Dept: INTERNAL MEDICINE | Age: 79
End: 2024-02-05

## 2024-02-05 NOTE — TELEPHONE ENCOUNTER
Nuria with Surgery Scheduling called from [facility] and states what you asked for was an ok to remove staples and she states that she cannot give that authorization you would need to call Ortho surgeons office at [phone]. She just asked me to take a message.

## 2024-02-06 ENCOUNTER — HOSPITAL ENCOUNTER (OUTPATIENT)
Age: 79
Discharge: HOME | End: 2024-02-06
Payer: MEDICARE

## 2024-02-06 ENCOUNTER — OFFICE VISIT (OUTPATIENT)
Dept: INTERNAL MEDICINE | Age: 79
End: 2024-02-06
Payer: MEDICARE

## 2024-02-06 VITALS
BODY MASS INDEX: 36.86 KG/M2 | HEIGHT: 63 IN | OXYGEN SATURATION: 98 % | DIASTOLIC BLOOD PRESSURE: 68 MMHG | TEMPERATURE: 98 F | HEART RATE: 60 BPM | RESPIRATION RATE: 14 BRPM | SYSTOLIC BLOOD PRESSURE: 122 MMHG | WEIGHT: 208 LBS

## 2024-02-06 DIAGNOSIS — N18.31 STAGE 3A CHRONIC KIDNEY DISEASE (HCC): ICD-10-CM

## 2024-02-06 DIAGNOSIS — M17.11 LOCALIZED OSTEOARTHRITIS OF RIGHT KNEE: Primary | ICD-10-CM

## 2024-02-06 DIAGNOSIS — D64.9 ANEMIA, UNSPECIFIED TYPE: ICD-10-CM

## 2024-02-06 DIAGNOSIS — Z09 HOSPITAL DISCHARGE FOLLOW-UP: ICD-10-CM

## 2024-02-06 DIAGNOSIS — Z48.02 ENCOUNTER FOR STAPLE REMOVAL: ICD-10-CM

## 2024-02-06 DIAGNOSIS — M17.11 LOCALIZED OSTEOARTHRITIS OF RIGHT KNEE: ICD-10-CM

## 2024-02-06 LAB
ANION GAP SERPL CALCULATED.3IONS-SCNC: 11 MMOL/L (ref 9–17)
BASOPHILS # BLD: 0.07 K/UL (ref 0–0.2)
BASOPHILS NFR BLD: 1 % (ref 0–2)
BUN SERPL-MCNC: 32 MG/DL (ref 8–23)
BUN/CREAT SERPL: 32 (ref 9–20)
CALCIUM SERPL-MCNC: 9.6 MG/DL (ref 8.6–10.4)
CHLORIDE SERPL-SCNC: 100 MMOL/L (ref 98–107)
CO2 SERPL-SCNC: 26 MMOL/L (ref 20–31)
CREAT SERPL-MCNC: 1 MG/DL (ref 0.5–0.9)
EOSINOPHIL # BLD: 0.15 K/UL (ref 0–0.44)
EOSINOPHILS RELATIVE PERCENT: 2 % (ref 1–4)
ERYTHROCYTE [DISTWIDTH] IN BLOOD BY AUTOMATED COUNT: 13.8 % (ref 11.8–14.4)
GFR SERPL CREATININE-BSD FRML MDRD: 57 ML/MIN/1.73M2
GLUCOSE SERPL-MCNC: 103 MG/DL (ref 70–99)
HCT VFR BLD AUTO: 33.3 % (ref 36.3–47.1)
HGB BLD-MCNC: 10.6 G/DL (ref 11.9–15.1)
IMM GRANULOCYTES # BLD AUTO: 0.05 K/UL (ref 0–0.3)
IMM GRANULOCYTES NFR BLD: 1 %
LYMPHOCYTES NFR BLD: 1.62 K/UL (ref 1.1–3.7)
LYMPHOCYTES RELATIVE PERCENT: 19 % (ref 24–43)
MCH RBC QN AUTO: 28.6 PG (ref 25.2–33.5)
MCHC RBC AUTO-ENTMCNC: 31.8 G/DL (ref 25.2–33.5)
MCV RBC AUTO: 90 FL (ref 82.6–102.9)
MONOCYTES NFR BLD: 0.69 K/UL (ref 0.1–1.2)
MONOCYTES NFR BLD: 8 % (ref 3–12)
NEUTROPHILS NFR BLD: 69 % (ref 36–65)
NEUTS SEG NFR BLD: 5.79 K/UL (ref 1.5–8.1)
NRBC BLD-RTO: 0 PER 100 WBC
PLATELET # BLD AUTO: 348 K/UL (ref 138–453)
PMV BLD AUTO: 10.1 FL (ref 8.1–13.5)
POTASSIUM SERPL-SCNC: 4.7 MMOL/L (ref 3.7–5.3)
RBC # BLD AUTO: 3.7 M/UL (ref 3.95–5.11)
SODIUM SERPL-SCNC: 137 MMOL/L (ref 135–144)
WBC OTHER # BLD: 8.4 K/UL (ref 3.5–11.3)

## 2024-02-06 PROCEDURE — G8483 FLU IMM NO ADMIN DOC REA: HCPCS | Performed by: NURSE PRACTITIONER

## 2024-02-06 PROCEDURE — 1111F DSCHRG MED/CURRENT MED MERGE: CPT | Performed by: NURSE PRACTITIONER

## 2024-02-06 PROCEDURE — 1090F PRES/ABSN URINE INCON ASSESS: CPT | Performed by: NURSE PRACTITIONER

## 2024-02-06 PROCEDURE — G8427 DOCREV CUR MEDS BY ELIG CLIN: HCPCS | Performed by: NURSE PRACTITIONER

## 2024-02-06 PROCEDURE — 36415 COLL VENOUS BLD VENIPUNCTURE: CPT

## 2024-02-06 PROCEDURE — PBSHW PBB SHADOW CHARGE: Performed by: NURSE PRACTITIONER

## 2024-02-06 PROCEDURE — 1036F TOBACCO NON-USER: CPT | Performed by: NURSE PRACTITIONER

## 2024-02-06 PROCEDURE — 1123F ACP DISCUSS/DSCN MKR DOCD: CPT | Performed by: NURSE PRACTITIONER

## 2024-02-06 PROCEDURE — 85025 COMPLETE CBC W/AUTO DIFF WBC: CPT

## 2024-02-06 PROCEDURE — 99213 OFFICE O/P EST LOW 20 MIN: CPT | Performed by: NURSE PRACTITIONER

## 2024-02-06 PROCEDURE — G8399 PT W/DXA RESULTS DOCUMENT: HCPCS | Performed by: NURSE PRACTITIONER

## 2024-02-06 PROCEDURE — 15853 REMOVAL SUTR/STAPL XREQ ANES: CPT | Performed by: NURSE PRACTITIONER

## 2024-02-06 PROCEDURE — G8417 CALC BMI ABV UP PARAM F/U: HCPCS | Performed by: NURSE PRACTITIONER

## 2024-02-06 PROCEDURE — 80048 BASIC METABOLIC PNL TOTAL CA: CPT

## 2024-02-06 PROCEDURE — 99214 OFFICE O/P EST MOD 30 MIN: CPT | Performed by: NURSE PRACTITIONER

## 2024-02-06 RX ORDER — HYDROCODONE BITARTRATE AND ACETAMINOPHEN 5; 325 MG/1; MG/1
TABLET ORAL
COMMUNITY
Start: 2024-01-28

## 2024-02-06 RX ORDER — CELECOXIB 100 MG/1
100 CAPSULE ORAL 2 TIMES DAILY
COMMUNITY
Start: 2024-01-23 | End: 2024-02-06

## 2024-02-06 ASSESSMENT — PATIENT HEALTH QUESTIONNAIRE - PHQ9
SUM OF ALL RESPONSES TO PHQ QUESTIONS 1-9: 0
3. TROUBLE FALLING OR STAYING ASLEEP: 0
2. FEELING DOWN, DEPRESSED OR HOPELESS: 0
4. FEELING TIRED OR HAVING LITTLE ENERGY: 0
SUM OF ALL RESPONSES TO PHQ QUESTIONS 1-9: 0
7. TROUBLE CONCENTRATING ON THINGS, SUCH AS READING THE NEWSPAPER OR WATCHING TELEVISION: 0
5. POOR APPETITE OR OVEREATING: 0
SUM OF ALL RESPONSES TO PHQ QUESTIONS 1-9: 0
6. FEELING BAD ABOUT YOURSELF - OR THAT YOU ARE A FAILURE OR HAVE LET YOURSELF OR YOUR FAMILY DOWN: 0
SUM OF ALL RESPONSES TO PHQ QUESTIONS 1-9: 0
SUM OF ALL RESPONSES TO PHQ9 QUESTIONS 1 & 2: 0
9. THOUGHTS THAT YOU WOULD BE BETTER OFF DEAD, OR OF HURTING YOURSELF: 0
10. IF YOU CHECKED OFF ANY PROBLEMS, HOW DIFFICULT HAVE THESE PROBLEMS MADE IT FOR YOU TO DO YOUR WORK, TAKE CARE OF THINGS AT HOME, OR GET ALONG WITH OTHER PEOPLE: 0
8. MOVING OR SPEAKING SO SLOWLY THAT OTHER PEOPLE COULD HAVE NOTICED. OR THE OPPOSITE, BEING SO FIGETY OR RESTLESS THAT YOU HAVE BEEN MOVING AROUND A LOT MORE THAN USUAL: 0
1. LITTLE INTEREST OR PLEASURE IN DOING THINGS: 0

## 2024-02-06 NOTE — PROGRESS NOTES
Negative for abdominal pain, blood in stool, constipation, diarrhea, nausea and vomiting.   Endocrine: Negative for cold intolerance, heat intolerance and polyuria.   Genitourinary:  Negative for difficulty urinating.   Musculoskeletal:  Negative for arthralgias, gait problem, myalgias, neck pain and neck stiffness.   Skin:  Negative for color change, rash and wound.   Neurological:  Negative for dizziness, tremors, seizures, weakness, light-headedness, numbness and headaches.   Psychiatric/Behavioral:  Negative for confusion and hallucinations. The patient is not nervous/anxious.        Physical Exam  Vitals and nursing note reviewed.   Constitutional:       General: She is not in acute distress.     Appearance: Normal appearance. She is well-developed. She is not diaphoretic.   HENT:      Head: Normocephalic and atraumatic.      Right Ear: External ear normal.      Left Ear: External ear normal.   Eyes:      General:         Right eye: No discharge.         Left eye: No discharge.   Neck:      Trachea: No tracheal deviation.   Cardiovascular:      Rate and Rhythm: Normal rate and regular rhythm.      Heart sounds: Normal heart sounds. No murmur heard.     No friction rub. No gallop.   Pulmonary:      Effort: Pulmonary effort is normal. No respiratory distress.      Breath sounds: Normal breath sounds. No stridor. No wheezing, rhonchi or rales.   Chest:      Chest wall: No tenderness.   Abdominal:      General: Bowel sounds are normal. There is no distension.      Palpations: Abdomen is soft.      Tenderness: There is no abdominal tenderness.   Musculoskeletal:         General: No swelling.      Right lower leg: Edema (Mild swelling anterior knee.  No significant erythema.) present.      Left lower leg: No edema.   Skin:     General: Skin is warm and dry.      Capillary Refill: Capillary refill takes less than 2 seconds.      Coloration: Skin is not jaundiced or pale.      Findings: No rash.      Comments: Midline

## 2024-02-07 ASSESSMENT — ENCOUNTER SYMPTOMS
DIARRHEA: 0
WHEEZING: 0
FACIAL SWELLING: 0
SINUS PRESSURE: 0
CONSTIPATION: 0
NAUSEA: 0
SORE THROAT: 0
SHORTNESS OF BREATH: 0
BLOOD IN STOOL: 0
CHEST TIGHTNESS: 0
ABDOMINAL PAIN: 0
COLOR CHANGE: 0
RHINORRHEA: 0
EYE PAIN: 0
TROUBLE SWALLOWING: 0
COUGH: 0
VOMITING: 0

## 2024-02-12 DIAGNOSIS — N18.31 STAGE 3A CHRONIC KIDNEY DISEASE (HCC): ICD-10-CM

## 2024-02-12 DIAGNOSIS — D64.9 ANEMIA, UNSPECIFIED TYPE: Primary | ICD-10-CM

## 2024-02-28 DIAGNOSIS — D47.2 MGUS (MONOCLONAL GAMMOPATHY OF UNKNOWN SIGNIFICANCE): Primary | ICD-10-CM

## 2024-02-29 DIAGNOSIS — R76.8 ELEVATED RHEUMATOID FACTOR: Primary | ICD-10-CM

## 2024-03-01 ENCOUNTER — HOSPITAL ENCOUNTER (OUTPATIENT)
Age: 79
Discharge: HOME | End: 2024-03-01
Payer: MEDICARE

## 2024-03-01 DIAGNOSIS — D64.9 ANEMIA, UNSPECIFIED TYPE: ICD-10-CM

## 2024-03-01 DIAGNOSIS — D47.2 MGUS (MONOCLONAL GAMMOPATHY OF UNKNOWN SIGNIFICANCE): ICD-10-CM

## 2024-03-01 DIAGNOSIS — N18.31 STAGE 3A CHRONIC KIDNEY DISEASE (HCC): ICD-10-CM

## 2024-03-01 LAB
ALBUMIN SERPL-MCNC: 4.4 G/DL (ref 3.5–5.2)
ALBUMIN/GLOB SERPL: 1.3 {RATIO} (ref 1–2.5)
ALP SERPL-CCNC: 68 U/L (ref 35–104)
ALT SERPL-CCNC: 14 U/L (ref 5–33)
ANION GAP SERPL CALCULATED.3IONS-SCNC: 11 MMOL/L (ref 9–17)
ANION GAP SERPL CALCULATED.3IONS-SCNC: 11 MMOL/L (ref 9–17)
AST SERPL-CCNC: 18 U/L
BASOPHILS # BLD: 0.06 K/UL (ref 0–0.2)
BASOPHILS # BLD: 0.06 K/UL (ref 0–0.2)
BASOPHILS NFR BLD: 1 % (ref 0–2)
BASOPHILS NFR BLD: 1 % (ref 0–2)
BILIRUB SERPL-MCNC: 0.2 MG/DL (ref 0.3–1.2)
BUN SERPL-MCNC: 29 MG/DL (ref 8–23)
BUN SERPL-MCNC: 29 MG/DL (ref 8–23)
BUN/CREAT SERPL: 32 (ref 9–20)
BUN/CREAT SERPL: 32 (ref 9–20)
CALCIUM SERPL-MCNC: 10 MG/DL (ref 8.6–10.4)
CALCIUM SERPL-MCNC: 10 MG/DL (ref 8.6–10.4)
CHLORIDE SERPL-SCNC: 98 MMOL/L (ref 98–107)
CHLORIDE SERPL-SCNC: 98 MMOL/L (ref 98–107)
CO2 SERPL-SCNC: 25 MMOL/L (ref 20–31)
CO2 SERPL-SCNC: 25 MMOL/L (ref 20–31)
CREAT SERPL-MCNC: 0.9 MG/DL (ref 0.5–0.9)
CREAT SERPL-MCNC: 0.9 MG/DL (ref 0.5–0.9)
EOSINOPHIL # BLD: 0.09 K/UL (ref 0–0.44)
EOSINOPHIL # BLD: 0.09 K/UL (ref 0–0.44)
EOSINOPHILS RELATIVE PERCENT: 2 % (ref 1–4)
EOSINOPHILS RELATIVE PERCENT: 2 % (ref 1–4)
ERYTHROCYTE [DISTWIDTH] IN BLOOD BY AUTOMATED COUNT: 13.5 % (ref 11.8–14.4)
ERYTHROCYTE [DISTWIDTH] IN BLOOD BY AUTOMATED COUNT: 13.5 % (ref 11.8–14.4)
GFR SERPL CREATININE-BSD FRML MDRD: >60 ML/MIN/1.73M2
GFR SERPL CREATININE-BSD FRML MDRD: >60 ML/MIN/1.73M2
GLUCOSE SERPL-MCNC: 94 MG/DL (ref 70–99)
GLUCOSE SERPL-MCNC: 94 MG/DL (ref 70–99)
HCT VFR BLD AUTO: 35 % (ref 36.3–47.1)
HCT VFR BLD AUTO: 35 % (ref 36.3–47.1)
HGB BLD-MCNC: 11.2 G/DL (ref 11.9–15.1)
HGB BLD-MCNC: 11.2 G/DL (ref 11.9–15.1)
IMM GRANULOCYTES # BLD AUTO: <0.03 K/UL (ref 0–0.3)
IMM GRANULOCYTES # BLD AUTO: <0.03 K/UL (ref 0–0.3)
IMM GRANULOCYTES NFR BLD: 0 %
IMM GRANULOCYTES NFR BLD: 0 %
LYMPHOCYTES NFR BLD: 1.5 K/UL (ref 1.1–3.7)
LYMPHOCYTES NFR BLD: 1.5 K/UL (ref 1.1–3.7)
LYMPHOCYTES RELATIVE PERCENT: 26 % (ref 24–43)
LYMPHOCYTES RELATIVE PERCENT: 26 % (ref 24–43)
MCH RBC QN AUTO: 28.6 PG (ref 25.2–33.5)
MCH RBC QN AUTO: 28.6 PG (ref 25.2–33.5)
MCHC RBC AUTO-ENTMCNC: 32 G/DL (ref 25.2–33.5)
MCHC RBC AUTO-ENTMCNC: 32 G/DL (ref 25.2–33.5)
MCV RBC AUTO: 89.5 FL (ref 82.6–102.9)
MCV RBC AUTO: 89.5 FL (ref 82.6–102.9)
MONOCYTES NFR BLD: 0.66 K/UL (ref 0.1–1.2)
MONOCYTES NFR BLD: 0.66 K/UL (ref 0.1–1.2)
MONOCYTES NFR BLD: 11 % (ref 3–12)
MONOCYTES NFR BLD: 11 % (ref 3–12)
NEUTROPHILS NFR BLD: 60 % (ref 36–65)
NEUTROPHILS NFR BLD: 60 % (ref 36–65)
NEUTS SEG NFR BLD: 3.5 K/UL (ref 1.5–8.1)
NEUTS SEG NFR BLD: 3.5 K/UL (ref 1.5–8.1)
NRBC BLD-RTO: 0 PER 100 WBC
NRBC BLD-RTO: 0 PER 100 WBC
PLATELET # BLD AUTO: 286 K/UL (ref 138–453)
PLATELET # BLD AUTO: 286 K/UL (ref 138–453)
PMV BLD AUTO: 10.8 FL (ref 8.1–13.5)
PMV BLD AUTO: 10.8 FL (ref 8.1–13.5)
POTASSIUM SERPL-SCNC: 4.9 MMOL/L (ref 3.7–5.3)
POTASSIUM SERPL-SCNC: 4.9 MMOL/L (ref 3.7–5.3)
PROT SERPL-MCNC: 7.8 G/DL (ref 6.4–8.3)
RBC # BLD AUTO: 3.91 M/UL (ref 3.95–5.11)
RBC # BLD AUTO: 3.91 M/UL (ref 3.95–5.11)
SODIUM SERPL-SCNC: 134 MMOL/L (ref 135–144)
SODIUM SERPL-SCNC: 134 MMOL/L (ref 135–144)
WBC OTHER # BLD: 5.8 K/UL (ref 3.5–11.3)
WBC OTHER # BLD: 5.8 K/UL (ref 3.5–11.3)

## 2024-03-01 PROCEDURE — 86334 IMMUNOFIX E-PHORESIS SERUM: CPT

## 2024-03-01 PROCEDURE — 80053 COMPREHEN METABOLIC PANEL: CPT

## 2024-03-01 PROCEDURE — 84165 PROTEIN E-PHORESIS SERUM: CPT

## 2024-03-01 PROCEDURE — 80048 BASIC METABOLIC PNL TOTAL CA: CPT

## 2024-03-01 PROCEDURE — 83521 IG LIGHT CHAINS FREE EACH: CPT

## 2024-03-01 PROCEDURE — 82784 ASSAY IGA/IGD/IGG/IGM EACH: CPT

## 2024-03-01 PROCEDURE — 85025 COMPLETE CBC W/AUTO DIFF WBC: CPT

## 2024-03-01 PROCEDURE — 36415 COLL VENOUS BLD VENIPUNCTURE: CPT

## 2024-03-01 PROCEDURE — 84155 ASSAY OF PROTEIN SERUM: CPT

## 2024-03-02 LAB
ALBUMIN PERCENT: NORMAL %
ALBUMIN SERPL-MCNC: NORMAL G/DL
ALPHA 2 PERCENT: NORMAL %
ALPHA1 GLOB SERPL ELPH-MCNC: NORMAL %
ALPHA1 GLOB SERPL ELPH-MCNC: NORMAL G/DL
ALPHA2 GLOB SERPL ELPH-MCNC: NORMAL G/DL
B-GLOBULIN SERPL ELPH-MCNC: NORMAL %
B-GLOBULIN SERPL ELPH-MCNC: NORMAL G/DL
FREE KAPPA/LAMBDA RATIO: 0.24 (ref 0.26–1.65)
GAMMA GLOB SERPL ELPH-MCNC: NORMAL G/DL
GAMMA GLOBULIN %: NORMAL %
IGA SERPL-MCNC: 125 MG/DL (ref 70–400)
IGG SERPL-MCNC: 1546 MG/DL (ref 700–1600)
IGM SERPL-MCNC: 109 MG/DL (ref 40–230)
KAPPA LC FREE SER-MCNC: 17.9 MG/L (ref 3.7–19.4)
LAMBDA LC FREE SERPL-MCNC: 74.1 MG/L (ref 5.7–26.3)
M PROTEIN 2 SERPL ELPH-MCNC: NORMAL G/DL
M PROTEIN SERPL ELPH-MCNC: NORMAL G/DL
PATHOLOGIST: NORMAL
PROT PATTERN SERPL ELPH-IMP: NORMAL
PROT SERPL-MCNC: 7.5 G/DL (ref 6.4–8.3)
TOTAL PROT. SUM,%: NORMAL %
TOTAL PROT. SUM: NORMAL G/DL

## 2024-03-04 ENCOUNTER — HOSPITAL ENCOUNTER (OUTPATIENT)
Dept: MAMMOGRAPHY | Age: 79
Discharge: HOME | End: 2024-03-06
Payer: MEDICARE

## 2024-03-04 ENCOUNTER — OFFICE VISIT (OUTPATIENT)
Dept: ONCOLOGY | Age: 79
End: 2024-03-04
Payer: MEDICARE

## 2024-03-04 VITALS
DIASTOLIC BLOOD PRESSURE: 64 MMHG | BODY MASS INDEX: 36.32 KG/M2 | OXYGEN SATURATION: 96 % | WEIGHT: 205 LBS | HEART RATE: 68 BPM | SYSTOLIC BLOOD PRESSURE: 116 MMHG | TEMPERATURE: 98.1 F | HEIGHT: 63 IN

## 2024-03-04 DIAGNOSIS — Z12.31 SCREENING MAMMOGRAM FOR HIGH-RISK PATIENT: ICD-10-CM

## 2024-03-04 DIAGNOSIS — D47.2 MGUS (MONOCLONAL GAMMOPATHY OF UNKNOWN SIGNIFICANCE): Primary | ICD-10-CM

## 2024-03-04 DIAGNOSIS — Z85.3 HISTORY OF BREAST CANCER: ICD-10-CM

## 2024-03-04 PROCEDURE — 1036F TOBACCO NON-USER: CPT | Performed by: INTERNAL MEDICINE

## 2024-03-04 PROCEDURE — G8427 DOCREV CUR MEDS BY ELIG CLIN: HCPCS | Performed by: INTERNAL MEDICINE

## 2024-03-04 PROCEDURE — 1090F PRES/ABSN URINE INCON ASSESS: CPT | Performed by: INTERNAL MEDICINE

## 2024-03-04 PROCEDURE — 99213 OFFICE O/P EST LOW 20 MIN: CPT | Performed by: INTERNAL MEDICINE

## 2024-03-04 PROCEDURE — G8399 PT W/DXA RESULTS DOCUMENT: HCPCS | Performed by: INTERNAL MEDICINE

## 2024-03-04 PROCEDURE — 1123F ACP DISCUSS/DSCN MKR DOCD: CPT | Performed by: INTERNAL MEDICINE

## 2024-03-04 PROCEDURE — G8483 FLU IMM NO ADMIN DOC REA: HCPCS | Performed by: INTERNAL MEDICINE

## 2024-03-04 PROCEDURE — G8417 CALC BMI ABV UP PARAM F/U: HCPCS | Performed by: INTERNAL MEDICINE

## 2024-03-04 PROCEDURE — 77063 BREAST TOMOSYNTHESIS BI: CPT

## 2024-03-04 PROCEDURE — 99214 OFFICE O/P EST MOD 30 MIN: CPT | Performed by: INTERNAL MEDICINE

## 2024-03-04 NOTE — PROGRESS NOTES
diagnosis treatment recommendations   Her monoclonal protein appears stable   She does not have anemia or renal failure   I advised her to follow-up with orthopedic surgery for her right knee pain  Return to clinic in 1 year with labs prior       MD Travis Beard MD  Hematologist/Medical Oncologist    On this date 3/4/24  I have spent 40 minutes reviewing previous notes, test results and face to face with the patient discussing the diagnosis and importance of compliance with the treatment plan. Greater than 50% of that time was spent face-to-face with the patient in counseling and coordinating her care.      This note is created with the assistance of a speech recognition program.  While intending to generate a document that actually reflects the content of the visit, the document can still have some errors including those of syntax and sound a like substitutions which may escape proof reading.  It such instances, actual meaning can be extrapolated by contextual diversion.

## 2024-03-05 LAB
ALBUMIN PERCENT: 58 % (ref 45–65)
ALBUMIN SERPL-MCNC: 4.4 G/DL (ref 3.2–5.2)
ALPHA 2 PERCENT: 11 % (ref 6–13)
ALPHA1 GLOB SERPL ELPH-MCNC: 0.2 G/DL (ref 0.1–0.4)
ALPHA1 GLOB SERPL ELPH-MCNC: 3 % (ref 3–6)
ALPHA2 GLOB SERPL ELPH-MCNC: 0.8 G/DL (ref 0.5–0.9)
B-GLOBULIN SERPL ELPH-MCNC: 0.8 G/DL (ref 0.5–1.1)
B-GLOBULIN SERPL ELPH-MCNC: 11 % (ref 11–19)
GAMMA GLOB SERPL ELPH-MCNC: 1.3 G/DL (ref 0.5–1.5)
GAMMA GLOBULIN %: 17 % (ref 9–20)
INTERPRETATION SERPL IFE-IMP: NORMAL
PATH REV: NORMAL
PATHOLOGIST: NORMAL
PROT PATTERN SERPL ELPH-IMP: NORMAL
PROT SERPL-MCNC: 7.5 G/DL (ref 6.4–8.3)
TOTAL PROT. SUM,%: 100 % (ref 98–102)
TOTAL PROT. SUM: 7.5 G/DL (ref 6.3–8.2)

## 2024-03-12 ENCOUNTER — HOSPITAL ENCOUNTER (OUTPATIENT)
Age: 79
Discharge: HOME | End: 2024-03-12
Payer: MEDICARE

## 2024-03-12 DIAGNOSIS — R76.8 ELEVATED RHEUMATOID FACTOR: ICD-10-CM

## 2024-03-12 LAB — RHEUMATOID FACT SER NEPH-ACNC: <10 IU/ML (ref 0–13)

## 2024-03-12 PROCEDURE — 36415 COLL VENOUS BLD VENIPUNCTURE: CPT

## 2024-03-12 PROCEDURE — 86431 RHEUMATOID FACTOR QUANT: CPT

## 2024-04-23 ENCOUNTER — TELEPHONE (OUTPATIENT)
Dept: INTERNAL MEDICINE | Age: 79
End: 2024-04-23

## 2024-04-23 NOTE — TELEPHONE ENCOUNTER
FYI: Referral declined by [facility] due to negative repeat of RA factor. They suggest follow up with PCP or ortho for osteoarthritis as likely source of concern.

## 2024-04-26 DIAGNOSIS — G47.9 SLEEP DISORDER: ICD-10-CM

## 2024-04-26 RX ORDER — ZALEPLON 10 MG/1
10 CAPSULE ORAL NIGHTLY PRN
Qty: 90 CAPSULE | Refills: 0 | Status: SHIPPED | OUTPATIENT
Start: 2024-04-26 | End: 2025-04-26

## 2024-04-26 NOTE — TELEPHONE ENCOUNTER
Refill request sonata sent to [facility]     Medication pended if agreeable     Last Appt:  2/6/2024  Next Appt:   6/19/2024  Med verified in Epic

## 2024-05-06 ENCOUNTER — APPOINTMENT (OUTPATIENT)
Dept: GENERAL RADIOLOGY | Age: 79
End: 2024-05-06
Payer: MEDICARE

## 2024-05-06 ENCOUNTER — HOSPITAL ENCOUNTER (EMERGENCY)
Age: 79
Discharge: HOME | End: 2024-05-06
Attending: EMERGENCY MEDICINE
Payer: MEDICARE

## 2024-05-06 VITALS
DIASTOLIC BLOOD PRESSURE: 69 MMHG | SYSTOLIC BLOOD PRESSURE: 163 MMHG | BODY MASS INDEX: 37.03 KG/M2 | OXYGEN SATURATION: 97 % | HEIGHT: 63 IN | HEART RATE: 59 BPM | WEIGHT: 209 LBS | RESPIRATION RATE: 18 BRPM | TEMPERATURE: 97.2 F

## 2024-05-06 DIAGNOSIS — S80.01XA CONTUSION OF RIGHT KNEE, INITIAL ENCOUNTER: Primary | ICD-10-CM

## 2024-05-06 PROCEDURE — 99283 EMERGENCY DEPT VISIT LOW MDM: CPT

## 2024-05-06 PROCEDURE — 73562 X-RAY EXAM OF KNEE 3: CPT

## 2024-05-06 ASSESSMENT — LIFESTYLE VARIABLES
HOW MANY STANDARD DRINKS CONTAINING ALCOHOL DO YOU HAVE ON A TYPICAL DAY: PATIENT DOES NOT DRINK
HOW OFTEN DO YOU HAVE A DRINK CONTAINING ALCOHOL: NEVER

## 2024-05-06 ASSESSMENT — PAIN - FUNCTIONAL ASSESSMENT: PAIN_FUNCTIONAL_ASSESSMENT: 0-10

## 2024-05-06 ASSESSMENT — PAIN DESCRIPTION - ORIENTATION: ORIENTATION: RIGHT

## 2024-05-06 ASSESSMENT — PAIN SCALES - GENERAL: PAINLEVEL_OUTOF10: 5

## 2024-05-06 ASSESSMENT — PAIN DESCRIPTION - LOCATION: LOCATION: KNEE

## 2024-05-06 NOTE — ED PROVIDER NOTES
pulse is 59. Her respiration is 18 and oxygen saturation is 97%.      The patient is alert and oriented, in no apparent distress.    HEENT is atraumatic.    Pupils are PERRL at 4 mm.  Mucous membranes moist.    Neck is supple with no lymphadenopathy.  No JVD.  No meningismus.    Heart sounds regular rate and rhythm with no gallops, murmurs, or rubs.    Lungs clear, no wheezes, rales or rhonchi.    Musculoskeletal exam: Examination of lower extremities demonstrates well-healed scars from prior total knee replacements.  Patient has mild discomfort in the medial right knee but no bruising or ligamentous laxity.  No pain in the calf or thigh.  Normal distal pulse appreciated.  The remainder the musculoskeletal dam is unremarkable.  Skin: no rash or edema.    Neurological exam reveals cranial nerves 2 through 12 grossly intact.  Patient has equal  and normal deep tendon reflexes.        DIFFERENTIAL DIAGNOSIS/ MDM:     Differential diagnosis: Sprain, contusion, fracture, dislocation    The patient presents with injury to her right knee.  She is able to bear weight.  She has no obvious deformity.  X-ray demonstrates no acute issues with regard to the hardware or bone.  The patient has a cane to use as needed and has a wrap to use as needed as well.  She will follow-up with her doctor or her orthopedist as needed.  She declines opioids for pain.    DIAGNOSTIC RESULTS         RADIOLOGY:   I reviewed the radiologist interpretations:  XR KNEE RIGHT (3 VIEWS)   Final Result   Right knee arthroplasty with no acute abnormality.              XR KNEE RIGHT (3 VIEWS) (Final result)  Result time 05/06/24 18:15:32  Final result by Ezra Mead MD (05/06/24 18:15:32)                Impression:    Right knee arthroplasty with no acute abnormality.            Narrative:    EXAMINATION:  THREE XRAY VIEWS OF THE RIGHT KNEE    5/6/2024 5:11 pm    COMPARISON:  None.    HISTORY:  ORDERING SYSTEM PROVIDED HISTORY: fall  TECHNOLOGIST

## 2024-05-06 NOTE — DISCHARGE INSTRUCTIONS
Apply ice and elevate.  Tylenol as directed.  See your doctor to follow-up.  Return for worsening pain, swelling, numb or cool toes, or if worse in any way.    Please understand that at this time there is no evidence for a more serious underlying process, but that early in the process of an illness or injury, an emergency department workup can be falsely reassuring.  You should contact your family doctor within the next 48 hours for a follow up appointment    THANK YOU!!!    From [facility] and [facility]    On behalf of the Emergency Department staff at [facility], I would like to thank you for giving us the opportunity to address your health care needs and concerns.    We hope that during your visit, our service was delivered in a professional and caring manner. Please keep [facility] in mind as we walk with you down the path to your own personal wellness.     Please expect an automated text message or email from us so we can ask a few questions about your health and progress. Based on your answers, a clinician may call you back to offer help and instructions.    Please understand that early in the process of an illness or injury, an emergency department workup can be falsely reassuring.  If you notice any worsening, changing or persistent symptoms please call your family doctor or return to the ER immediately.     Tell us how we did during your visit at [email or web address]   and let us know about your experience

## 2024-05-07 NOTE — TELEPHONE ENCOUNTER
Pt notified. Pt reports the pain in her hands has been increased lately. She feels it is due to the weather. She declines ortho follow up at this time.  She reports falling and went to [facility] ER on 5/6/24 for rt knee pain. She declines follow up visit at this time. She states that she will discuss further at her next routine visit. Encouraged pt to call if she would like to be seen sooner.

## 2024-05-14 DIAGNOSIS — F32.9 CHRONIC MAJOR DEPRESSIVE DISORDER: ICD-10-CM

## 2024-05-14 RX ORDER — ESCITALOPRAM OXALATE 10 MG/1
10 TABLET ORAL DAILY
Qty: 90 TABLET | Refills: 3 | Status: SHIPPED | OUTPATIENT
Start: 2024-05-14

## 2024-06-14 ENCOUNTER — HOSPITAL ENCOUNTER (OUTPATIENT)
Age: 79
Discharge: HOME | End: 2024-06-14
Payer: MEDICARE

## 2024-06-14 DIAGNOSIS — E78.5 DYSLIPIDEMIA: ICD-10-CM

## 2024-06-14 DIAGNOSIS — R73.01 IFG (IMPAIRED FASTING GLUCOSE): ICD-10-CM

## 2024-06-14 LAB
ANION GAP SERPL CALCULATED.3IONS-SCNC: 9 MMOL/L (ref 9–17)
BUN SERPL-MCNC: 28 MG/DL (ref 8–23)
BUN/CREAT SERPL: 35 (ref 9–20)
CALCIUM SERPL-MCNC: 9.9 MG/DL (ref 8.6–10.4)
CHLORIDE SERPL-SCNC: 99 MMOL/L (ref 98–107)
CHOLEST SERPL-MCNC: 265 MG/DL (ref 0–199)
CHOLESTEROL/HDL RATIO: 4
CO2 SERPL-SCNC: 26 MMOL/L (ref 20–31)
CREAT SERPL-MCNC: 0.8 MG/DL (ref 0.5–0.9)
EST. AVERAGE GLUCOSE BLD GHB EST-MCNC: 117 MG/DL
GFR, ESTIMATED: 75 ML/MIN/1.73M2
GLUCOSE SERPL-MCNC: 96 MG/DL (ref 70–99)
HBA1C MFR BLD: 5.7 % (ref 4–6)
HDLC SERPL-MCNC: 63 MG/DL
LDLC SERPL CALC-MCNC: 161 MG/DL (ref 0–100)
POTASSIUM SERPL-SCNC: 4.8 MMOL/L (ref 3.7–5.3)
SODIUM SERPL-SCNC: 134 MMOL/L (ref 135–144)
TRIGL SERPL-MCNC: 206 MG/DL
VLDLC SERPL CALC-MCNC: 41 MG/DL

## 2024-06-14 PROCEDURE — 36415 COLL VENOUS BLD VENIPUNCTURE: CPT

## 2024-06-14 PROCEDURE — 80061 LIPID PANEL: CPT

## 2024-06-14 PROCEDURE — 83036 HEMOGLOBIN GLYCOSYLATED A1C: CPT

## 2024-06-14 PROCEDURE — 80048 BASIC METABOLIC PNL TOTAL CA: CPT

## 2024-06-19 ENCOUNTER — OFFICE VISIT (OUTPATIENT)
Dept: INTERNAL MEDICINE | Age: 79
End: 2024-06-19
Payer: MEDICARE

## 2024-06-19 VITALS
HEIGHT: 63 IN | DIASTOLIC BLOOD PRESSURE: 72 MMHG | HEART RATE: 60 BPM | WEIGHT: 205.8 LBS | BODY MASS INDEX: 36.46 KG/M2 | SYSTOLIC BLOOD PRESSURE: 122 MMHG

## 2024-06-19 DIAGNOSIS — R73.01 IFG (IMPAIRED FASTING GLUCOSE): ICD-10-CM

## 2024-06-19 DIAGNOSIS — G89.29 CHRONIC PAIN OF RIGHT KNEE: ICD-10-CM

## 2024-06-19 DIAGNOSIS — J43.1 PANLOBULAR EMPHYSEMA (HCC): ICD-10-CM

## 2024-06-19 DIAGNOSIS — Z00.00 MEDICARE ANNUAL WELLNESS VISIT, SUBSEQUENT: Primary | ICD-10-CM

## 2024-06-19 DIAGNOSIS — Z85.3 HISTORY OF BREAST CANCER: ICD-10-CM

## 2024-06-19 DIAGNOSIS — E55.9 VITAMIN D DEFICIENCY: ICD-10-CM
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
## 2024-06-19 DIAGNOSIS — G47.9 SLEEP DISORDER: ICD-10-CM

## 2024-06-19 DIAGNOSIS — M17.11 LOCALIZED OSTEOARTHRITIS OF RIGHT KNEE: ICD-10-CM

## 2024-06-19 DIAGNOSIS — F32.9 CHRONIC MAJOR DEPRESSIVE DISORDER: ICD-10-CM

## 2024-06-19 DIAGNOSIS — M85.80 OSTEOPENIA, UNSPECIFIED LOCATION: ICD-10-CM

## 2024-06-19 DIAGNOSIS — E78.5 DYSLIPIDEMIA: ICD-10-CM

## 2024-06-19 DIAGNOSIS — J30.2 SEASONAL ALLERGIES: ICD-10-CM

## 2024-06-19 DIAGNOSIS — M47.816 LUMBAR SPONDYLOSIS: ICD-10-CM

## 2024-06-19 DIAGNOSIS — G62.9 PERIPHERAL POLYNEUROPATHY: ICD-10-CM

## 2024-06-19 DIAGNOSIS — M05.80 POLYARTHRITIS WITH POSITIVE RHEUMATOID FACTOR (HCC): ICD-10-CM

## 2024-06-19 DIAGNOSIS — D47.2 MGUS (MONOCLONAL GAMMOPATHY OF UNKNOWN SIGNIFICANCE): ICD-10-CM

## 2024-06-19 DIAGNOSIS — M25.561 CHRONIC PAIN OF RIGHT KNEE: ICD-10-CM

## 2024-06-19 PROCEDURE — 99213 OFFICE O/P EST LOW 20 MIN: CPT | Performed by: NURSE PRACTITIONER

## 2024-06-19 RX ORDER — FLUTICASONE PROPIONATE 50 MCG
1 SPRAY, SUSPENSION (ML) NASAL DAILY
Qty: 3 EACH | Refills: 3 | Status: SHIPPED | OUTPATIENT
Start: 2024-06-19

## 2024-06-19 ASSESSMENT — PATIENT HEALTH QUESTIONNAIRE - PHQ9
5. POOR APPETITE OR OVEREATING: NOT AT ALL
2. FEELING DOWN, DEPRESSED OR HOPELESS: NOT AT ALL
8. MOVING OR SPEAKING SO SLOWLY THAT OTHER PEOPLE COULD HAVE NOTICED. OR THE OPPOSITE, BEING SO FIGETY OR RESTLESS THAT YOU HAVE BEEN MOVING AROUND A LOT MORE THAN USUAL: NOT AT ALL
10. IF YOU CHECKED OFF ANY PROBLEMS, HOW DIFFICULT HAVE THESE PROBLEMS MADE IT FOR YOU TO DO YOUR WORK, TAKE CARE OF THINGS AT HOME, OR GET ALONG WITH OTHER PEOPLE: NOT DIFFICULT AT ALL
6. FEELING BAD ABOUT YOURSELF - OR THAT YOU ARE A FAILURE OR HAVE LET YOURSELF OR YOUR FAMILY DOWN: NOT AT ALL
SUM OF ALL RESPONSES TO PHQ QUESTIONS 1-9: 0
9. THOUGHTS THAT YOU WOULD BE BETTER OFF DEAD, OR OF HURTING YOURSELF: NOT AT ALL
SUM OF ALL RESPONSES TO PHQ QUESTIONS 1-9: 0
1. LITTLE INTEREST OR PLEASURE IN DOING THINGS: NOT AT ALL
7. TROUBLE CONCENTRATING ON THINGS, SUCH AS READING THE NEWSPAPER OR WATCHING TELEVISION: NOT AT ALL
3. TROUBLE FALLING OR STAYING ASLEEP: NOT AT ALL
SUM OF ALL RESPONSES TO PHQ9 QUESTIONS 1 & 2: 0
4. FEELING TIRED OR HAVING LITTLE ENERGY: NOT AT ALL

## 2024-06-19 ASSESSMENT — LIFESTYLE VARIABLES
HOW OFTEN DO YOU HAVE A DRINK CONTAINING ALCOHOL: 2-4 TIMES A MONTH
HOW MANY STANDARD DRINKS CONTAINING ALCOHOL DO YOU HAVE ON A TYPICAL DAY: 1 OR 2

## 2024-06-19 NOTE — PATIENT INSTRUCTIONS
for you. Talk to your doctor if you need help losing weight.     Try to get 7 to 9 hours of sleep each night.     Limit alcohol to 2 drinks a day for men and 1 drink a day for women. Too much alcohol can cause health problems.     Manage other health problems such as diabetes, high blood pressure, and high cholesterol. If you think you may have a problem with alcohol or drug use, talk to your doctor.   Medicines    Take your medicines exactly as prescribed. Call your doctor if you think you are having a problem with your medicine.     If your doctor recommends aspirin, take the amount directed each day. Make sure you take aspirin and not another kind of pain reliever, such as acetaminophen (Tylenol).   When should you call for help?   Call 911 if you have symptoms of a heart attack. These may include:    Chest pain or pressure, or a strange feeling in the chest.     Sweating.     Shortness of breath.     Pain, pressure, or a strange feeling in the back, neck, jaw, or upper belly or in one or both shoulders or arms.     Lightheadedness or sudden weakness.     A fast or irregular heartbeat.   After you call 911, the  may tell you to chew 1 adult-strength or 2 to 4 low-dose aspirin. Wait for an ambulance. Do not try to drive yourself.  Watch closely for changes in your health, and be sure to contact your doctor if you have any problems.  Where can you learn more?  Go to https://www.[employer].bg.net/patientEd and enter F075 to learn more about \"A Healthy Heart: Care Instructions.\"  Current as of: June 24, 2023  Content Version: 14.1  © 2006-2024 [employer].   Care instructions adapted under license by [employer]. If you have questions about a medical condition or this instruction, always ask your healthcare professional. [employer] disclaims any warranty or liability for your use of this information.      Personalized Preventive Plan for Brooke Haley - 6/19/2024  Medicare offers a

## 2024-06-19 NOTE — PROGRESS NOTES
Life Alert discussed with pt.  
tablet by mouth daily Yes Provider, MD Hilary   magnesium (MAGNESIUM-OXIDE) 250 MG TABS tablet Take 1 tablet by mouth daily Yes ProviderHilary MD   B Complex Vitamins (VITAMIN B COMPLEX PO) Take by mouth daily Yes Provider, MD Hilary   Handicap Placard MISC by Does not apply route The disability is expected to last 9/5/2024  Gin Perry APRN - CNP       CareTeam (Including outside providers/suppliers regularly involved in providing care):   Patient Care Team:  Gin Perry APRN - CNP as PCP - General (Nurse Practitioner)  Gin Perry APRN - CNP as PCP - Empaneled Provider     Reviewed and updated this visit:  Tobacco  Allergies  Meds  Med Hx  Surg Hx  Soc Hx  Fam Hx      I, Bina Bryan LPN, 6/19/2024, performed the documented evaluation under the direct supervision of the attending physician.      
Perception: Attention normal.         Mood and Affect: Mood normal. Affect is blunt.         Speech: Speech normal.         Behavior: Behavior normal. Behavior is cooperative.         Thought Content: Thought content normal. Thought content is not paranoid or delusional. Thought content does not include homicidal or suicidal ideation. Thought content does not include homicidal or suicidal plan.         Cognition and Memory: Cognition normal.         Judgment: Judgment normal.       Vitals:    06/19/24 1446   BP: 122/72   Site: Left Upper Arm   Position: Sitting   Cuff Size: Large Adult   Pulse: 60   Weight: 93.4 kg (205 lb 12.8 oz)   Height: 1.6 m (5' 3\")       Assessment:  1. Medicare annual wellness visit, subsequent    2. Localized osteoarthritis of right knee  May increase her Voltaren gel to 3 times a day.  Tylenol as needed    3. Chronic major depressive disorder  Stable on Lexapro, continue the same    4. Sleep disorder  Continues on Sonata.  No signs of abuse or misuse    5. History of breast cancer  She does get her yearly mammograms.  Continues to follow with oncology.  Has been off antihormonal therapy since 2016 due to side effects    6. Dyslipidemia  The 10-year ASCVD risk score (Miguelito LEE, et al., 2019) is: 22.3%    Values used to calculate the score:      Age: 78 years      Sex: Female      Is Non- : No      Diabetic: No      Tobacco smoker: No      Systolic Blood Pressure: 122 mmHg      Is BP treated: No      HDL Cholesterol: 63 mg/dL      Total Cholesterol: 265 mg/dL  Continue to work on diet, remain active.  Aware of elevated CVS risk factors but declines statin due to significant myalgias    7. Osteopenia, unspecified location  Continue on calcium rich diet along with supplemental vitamin D.  Weightbearing exercises.  Declines any biphosphonate's    8. MGUS (monoclonal gammopathy of unknown significance)  Ongoing follow-up with hematology    9. Peripheral

## 2024-06-21 ASSESSMENT — ENCOUNTER SYMPTOMS
CONSTIPATION: 0
RHINORRHEA: 1
TROUBLE SWALLOWING: 0
NAUSEA: 0
COLOR CHANGE: 0
BACK PAIN: 1
SHORTNESS OF BREATH: 0
SINUS PRESSURE: 0
BLOOD IN STOOL: 0
DIARRHEA: 0
COUGH: 0
CHEST TIGHTNESS: 0
VOMITING: 0
FACIAL SWELLING: 0
ABDOMINAL PAIN: 0
SORE THROAT: 0
EYE PAIN: 0
WHEEZING: 0

## 2024-07-10 ENCOUNTER — TELEPHONE (OUTPATIENT)
Dept: INTERNAL MEDICINE | Age: 79
End: 2024-07-10

## 2024-07-10 RX ORDER — DICLOFENAC SODIUM 30 MG/G
GEL TOPICAL
Qty: 300 G | Refills: 3 | Status: SHIPPED | OUTPATIENT
Start: 2024-07-10

## 2024-07-10 NOTE — TELEPHONE ENCOUNTER
Patient called requesting her diclofenac sodium gel be sent in at 3%  for 2 or 3 tubes.  Patient stated that she received 1% (9 tubes). Patient request that this script be sent to [facility].

## 2024-07-29 DIAGNOSIS — G47.9 SLEEP DISORDER: ICD-10-CM

## 2024-07-29 RX ORDER — ZALEPLON 10 MG/1
CAPSULE ORAL
Qty: 90 CAPSULE | Refills: 0 | Status: SHIPPED | OUTPATIENT
Start: 2024-07-29 | End: 2024-10-27

## 2024-10-30 DIAGNOSIS — F32.9 CHRONIC MAJOR DEPRESSIVE DISORDER: ICD-10-CM

## 2024-10-30 RX ORDER — ESCITALOPRAM OXALATE 10 MG/1
10 TABLET ORAL DAILY
Qty: 90 TABLET | Refills: 3 | Status: SHIPPED | OUTPATIENT
Start: 2024-10-30

## 2024-11-22 DIAGNOSIS — G47.9 SLEEP DISORDER: ICD-10-CM

## 2024-11-22 RX ORDER — ZALEPLON 10 MG/1
10 CAPSULE ORAL NIGHTLY
Qty: 90 CAPSULE | Refills: 0 | Status: SHIPPED | OUTPATIENT
Start: 2024-11-22 | End: 2025-02-20

## 2024-11-22 NOTE — TELEPHONE ENCOUNTER
Brooke called requesting a refill of the below medication which has been pended for you:     Requested Prescriptions     Pending Prescriptions Disp Refills    zaleplon (SONATA) 10 MG capsule 90 capsule 0     Sig: TAKE 1 CAPSULE BY MOUTH EVERY  NIGHT AS NEEDED FOR SLEEP       Last Appointment Date: 6/19/24  Next Appointment Date: 1/15/25  Allergies   Allergen Reactions    Atorvastatin Other (See Comments)     MUSCLE PAIN/ACHES- all statins cause muscle pain    Crestor [Rosuvastatin]      MUSCLE PAIN/ACHES      Cymbalta [Duloxetine Hcl]      Disorientation, excessive sleepiness    Influenza Vaccines Other (See Comments)     Causes problems with eating eggs  Causes problems with eating eggs    Lipitor      MUSCLE PAIN/ACHES      Meloxicam Other (See Comments)     constipation    Pravastatin      MUSCLE PAIN/ACHES    Prednisone      Lost eyelashes       Red Yeast Rice [Monascus Purpureus Went Yeast]      POORLY TOLERATED    Zetia [Ezetimibe]      CONSTIPATION      Zocor [Simvastatin]      MUSCLE PAIN/ACHES    Floxin [Ofloxacin] Nausea And Vomiting    Gabapentin Rash

## 2024-11-22 NOTE — TELEPHONE ENCOUNTER
Brooke called requesting a refill of the below medication which has been pended for you:     Requested Prescriptions     Pending Prescriptions Disp Refills    zaleplon (SONATA) 10 MG capsule 90 capsule 0       Last Appointment Date: Visit date not found  Next Appointment Date: Visit date not found    Allergies   Allergen Reactions    Atorvastatin Other (See Comments)     MUSCLE PAIN/ACHES- all statins cause muscle pain    Crestor [Rosuvastatin]      MUSCLE PAIN/ACHES      Cymbalta [Duloxetine Hcl]      Disorientation, excessive sleepiness    Influenza Vaccines Other (See Comments)     Causes problems with eating eggs  Causes problems with eating eggs    Lipitor      MUSCLE PAIN/ACHES      Meloxicam Other (See Comments)     constipation    Pravastatin      MUSCLE PAIN/ACHES    Prednisone      Lost eyelashes       Red Yeast Rice [Monascus Purpureus Went Yeast]      POORLY TOLERATED    Zetia [Ezetimibe]      CONSTIPATION      Zocor [Simvastatin]      MUSCLE PAIN/ACHES    Floxin [Ofloxacin] Nausea And Vomiting    Gabapentin Rash

## 2024-12-02 ENCOUNTER — TELEPHONE (OUTPATIENT)
Dept: INTERNAL MEDICINE | Age: 79
End: 2024-12-02

## 2024-12-02 DIAGNOSIS — N64.9 DISORDER OF BREAST, UNSPECIFIED: ICD-10-CM

## 2024-12-02 DIAGNOSIS — N63.10 MASS OF RIGHT BREAST, UNSPECIFIED QUADRANT: Primary | ICD-10-CM

## 2024-12-02 NOTE — TELEPHONE ENCOUNTER
Patient called in as she found a lump on the right breast and she would like a diagnostic mammogram to make sure it is not cancer?

## 2024-12-11 ENCOUNTER — HOSPITAL ENCOUNTER (OUTPATIENT)
Dept: MAMMOGRAPHY | Age: 79
Discharge: HOME | End: 2024-12-13
Payer: MEDICARE

## 2024-12-11 ENCOUNTER — HOSPITAL ENCOUNTER (OUTPATIENT)
Dept: ULTRASOUND IMAGING | Age: 79
Discharge: HOME | End: 2024-12-13
Payer: MEDICARE

## 2024-12-11 DIAGNOSIS — N64.9 DISORDER OF BREAST, UNSPECIFIED: ICD-10-CM

## 2024-12-11 DIAGNOSIS — N63.10 MASS OF RIGHT BREAST, UNSPECIFIED QUADRANT: ICD-10-CM

## 2024-12-11 PROCEDURE — 76642 ULTRASOUND BREAST LIMITED: CPT

## 2024-12-11 PROCEDURE — G0279 TOMOSYNTHESIS, MAMMO: HCPCS

## 2025-01-15 ENCOUNTER — OFFICE VISIT (OUTPATIENT)
Dept: INTERNAL MEDICINE | Age: 80
End: 2025-01-15

## 2025-01-15 VITALS — DIASTOLIC BLOOD PRESSURE: 72 MMHG | HEART RATE: 78 BPM | SYSTOLIC BLOOD PRESSURE: 122 MMHG

## 2025-01-15 DIAGNOSIS — E55.9 VITAMIN D DEFICIENCY: ICD-10-CM

## 2025-01-15 DIAGNOSIS — R73.01 IFG (IMPAIRED FASTING GLUCOSE): ICD-10-CM

## 2025-01-15 DIAGNOSIS — G62.9 PERIPHERAL POLYNEUROPATHY: ICD-10-CM

## 2025-01-15 DIAGNOSIS — M05.80 POLYARTHRITIS WITH POSITIVE RHEUMATOID FACTOR (HCC): ICD-10-CM

## 2025-01-15 DIAGNOSIS — D47.2 MGUS (MONOCLONAL GAMMOPATHY OF UNKNOWN SIGNIFICANCE): ICD-10-CM

## 2025-01-15 DIAGNOSIS — F32.9 CHRONIC MAJOR DEPRESSIVE DISORDER: Primary | ICD-10-CM

## 2025-01-15 DIAGNOSIS — Z85.3 HISTORY OF BREAST CANCER: ICD-10-CM

## 2025-01-15 DIAGNOSIS — J43.1 PANLOBULAR EMPHYSEMA (HCC): ICD-10-CM

## 2025-01-15 DIAGNOSIS — M17.11 LOCALIZED OSTEOARTHRITIS OF RIGHT KNEE: ICD-10-CM

## 2025-01-15 DIAGNOSIS — E78.5 DYSLIPIDEMIA: ICD-10-CM

## 2025-01-15 DIAGNOSIS — J30.2 SEASONAL ALLERGIES: ICD-10-CM

## 2025-01-15 DIAGNOSIS — M47.816 LUMBAR SPONDYLOSIS: ICD-10-CM

## 2025-01-15 DIAGNOSIS — M85.80 OSTEOPENIA, UNSPECIFIED LOCATION: ICD-10-CM

## 2025-01-15 SDOH — ECONOMIC STABILITY: FOOD INSECURITY: WITHIN THE PAST 12 MONTHS, YOU WORRIED THAT YOUR FOOD WOULD RUN OUT BEFORE YOU GOT MONEY TO BUY MORE.: NEVER TRUE

## 2025-01-15 SDOH — ECONOMIC STABILITY: FOOD INSECURITY: WITHIN THE PAST 12 MONTHS, THE FOOD YOU BOUGHT JUST DIDN'T LAST AND YOU DIDN'T HAVE MONEY TO GET MORE.: NEVER TRUE

## 2025-01-15 ASSESSMENT — PATIENT HEALTH QUESTIONNAIRE - PHQ9
SUM OF ALL RESPONSES TO PHQ QUESTIONS 1-9: 2
2. FEELING DOWN, DEPRESSED OR HOPELESS: SEVERAL DAYS
4. FEELING TIRED OR HAVING LITTLE ENERGY: NOT AT ALL
9. THOUGHTS THAT YOU WOULD BE BETTER OFF DEAD, OR OF HURTING YOURSELF: NOT AT ALL
1. LITTLE INTEREST OR PLEASURE IN DOING THINGS: SEVERAL DAYS
SUM OF ALL RESPONSES TO PHQ9 QUESTIONS 1 & 2: 2
5. POOR APPETITE OR OVEREATING: NOT AT ALL
3. TROUBLE FALLING OR STAYING ASLEEP: NOT AT ALL
SUM OF ALL RESPONSES TO PHQ QUESTIONS 1-9: 2
SUM OF ALL RESPONSES TO PHQ QUESTIONS 1-9: 2
8. MOVING OR SPEAKING SO SLOWLY THAT OTHER PEOPLE COULD HAVE NOTICED. OR THE OPPOSITE, BEING SO FIGETY OR RESTLESS THAT YOU HAVE BEEN MOVING AROUND A LOT MORE THAN USUAL: NOT AT ALL
SUM OF ALL RESPONSES TO PHQ QUESTIONS 1-9: 2
7. TROUBLE CONCENTRATING ON THINGS, SUCH AS READING THE NEWSPAPER OR WATCHING TELEVISION: NOT AT ALL
10. IF YOU CHECKED OFF ANY PROBLEMS, HOW DIFFICULT HAVE THESE PROBLEMS MADE IT FOR YOU TO DO YOUR WORK, TAKE CARE OF THINGS AT HOME, OR GET ALONG WITH OTHER PEOPLE: NOT DIFFICULT AT ALL
6. FEELING BAD ABOUT YOURSELF - OR THAT YOU ARE A FAILURE OR HAVE LET YOURSELF OR YOUR FAMILY DOWN: NOT AT ALL

## 2025-01-16 NOTE — PROGRESS NOTES
arthroplasty    3. History of breast cancer  Does get her yearly mammograms.  Continues to follow with oncology.  Has been off hormonal therapy since 2016 due to side effects    4. Dyslipidemia  Continue to work on diet, remain active.  History of elevated CVS risks factor but declines statin drugs due to myalgias.  - Lipid Panel; Future    5. Osteopenia, unspecified location  Continues with calcium rich diet, supplemental vitamin D, weightbearing exercises.  Declines any prescription medication, aware of risk    6. MGUS (monoclonal gammopathy of unknown significance)  Ongoing follow-up with labs through hematology    7. Peripheral polyneuropathy  Stable off medication    8. Vitamin D deficiency  Continues on supplemental vitamin D, serial lab follow-ups ordered  - Vitamin D 25 Hydroxy; Future    9. IFG (impaired fasting glucose)  Obtain follow-up labs, work on diet, remain active  - Hemoglobin A1C; Future  - Comprehensive Metabolic Panel; Future    10. Panlobular emphysema (HCC)  Stable no recent exacerbations    11. Seasonal allergies  Stable with as needed Flonase    12. Lumbar spondylosis  Stable at present.    13. Polyarthritis with positive rheumatoid factor (HCC)  Seen by rheumatology.  Declines was any further need to be seen per rheumatology.    14.  History of colon cancer in mother  Patient mother with colon cancer late 40s.  Pending health will be due for colonoscopy after December 2025.    Plan:  As noted above.  Vaccines discussed  Follow up for routine visit.  Call sooner with concerns prior.    Electronically signed by NICOLASA Chu CNP on 1/15/2025 at 9:32 PM

## 2025-02-13 ENCOUNTER — OFFICE VISIT (OUTPATIENT)
Dept: SURGERY | Age: 80
End: 2025-02-13
Payer: MEDICARE

## 2025-02-13 ENCOUNTER — HOSPITAL ENCOUNTER (OUTPATIENT)
Age: 80
Discharge: HOME | End: 2025-02-13
Payer: MEDICARE

## 2025-02-13 VITALS
TEMPERATURE: 97.2 F | SYSTOLIC BLOOD PRESSURE: 122 MMHG | DIASTOLIC BLOOD PRESSURE: 62 MMHG | BODY MASS INDEX: 37.49 KG/M2 | RESPIRATION RATE: 16 BRPM | WEIGHT: 211.6 LBS | HEIGHT: 63 IN | HEART RATE: 76 BPM

## 2025-02-13 DIAGNOSIS — Z12.31 SCREENING MAMMOGRAM FOR BREAST CANCER: Primary | ICD-10-CM

## 2025-02-13 DIAGNOSIS — R22.41 SKIN LUMP OF LEG, RIGHT: ICD-10-CM

## 2025-02-13 DIAGNOSIS — R22.41 SKIN LUMP OF LEG, RIGHT: Primary | ICD-10-CM

## 2025-02-13 LAB
CREAT SERPL-MCNC: 1 MG/DL (ref 0.5–0.9)
GFR, ESTIMATED: 57 ML/MIN/1.73M2

## 2025-02-13 PROCEDURE — G8427 DOCREV CUR MEDS BY ELIG CLIN: HCPCS | Performed by: SURGERY

## 2025-02-13 PROCEDURE — 1036F TOBACCO NON-USER: CPT | Performed by: SURGERY

## 2025-02-13 PROCEDURE — 1123F ACP DISCUSS/DSCN MKR DOCD: CPT | Performed by: SURGERY

## 2025-02-13 PROCEDURE — G8417 CALC BMI ABV UP PARAM F/U: HCPCS | Performed by: SURGERY

## 2025-02-13 PROCEDURE — 82565 ASSAY OF CREATININE: CPT

## 2025-02-13 PROCEDURE — 99204 OFFICE O/P NEW MOD 45 MIN: CPT | Performed by: SURGERY

## 2025-02-13 PROCEDURE — 1126F AMNT PAIN NOTED NONE PRSNT: CPT | Performed by: SURGERY

## 2025-02-13 PROCEDURE — 1090F PRES/ABSN URINE INCON ASSESS: CPT | Performed by: SURGERY

## 2025-02-13 PROCEDURE — G8399 PT W/DXA RESULTS DOCUMENT: HCPCS | Performed by: SURGERY

## 2025-02-13 PROCEDURE — 1159F MED LIST DOCD IN RCRD: CPT | Performed by: SURGERY

## 2025-02-13 PROCEDURE — 99213 OFFICE O/P EST LOW 20 MIN: CPT | Performed by: SURGERY

## 2025-02-13 PROCEDURE — 36415 COLL VENOUS BLD VENIPUNCTURE: CPT

## 2025-02-13 NOTE — PROGRESS NOTES
Brooke Haley is a 79 y.o. female      CC:    \"Lump\" on right leg    HISTORY OF PRESENT ILLNESS:    Pt is an 78 yo F with:    Patient here for lump on right leg. Reports it has been there for 5 years but increasing in size. Denies pain or drainage.   Is tender sometimes when pushing on it.   Had knee replacement 1-2 yrs ago, but lump was there prior to knee replacement.    Review of Systems:    General:  Fever: Negative  Weight Change:Negative  Night Sweats: Negative    Eye:  Blurry Vision:Negative  Double Vision: Negative    Ent:  Headaches: Negative  Sore throat: Negative    Allergy/Immunology:  Hives: Negative  Latex allergy: Negative    Hematology/Lymphatic:  Bleeding Problems: Negative  Blood Clots: Negative  Swollen Lymph Nodes: Negative    Lungs:  Cough: Negative  SOB: Negative  Wheezing:Negative    Cardiovascular:  Chest Pain: Negative  Palpitations:Negative    GI:   Decreased Appetite: Negative  Heartburn: Negative  Dysphagia: Negative  Nausea/Vomiting: Negative  Abdominal Pain: Negative  Change in Bowels:Negative  Constipation: Negative  Diarrhea: Negative  Rectal Bleeding: Negative    :   Dysuria: Negative  Increase Urinary Frequency/Urgency: Negative    Neuro:  Seizures: Negative  Confusion: Negative        PAST MEDICAL HISTORY:      Family History   Problem Relation Age of Onset    Cancer Mother         COLON CANCER    Heart Attack Father         LATE 70'S    Diabetes Neg Hx     Breast Cancer Neg Hx     Cataracts Neg Hx     Glaucoma Neg Hx      Social History     Socioeconomic History    Marital status:      Spouse name: Not on file    Number of children: 2    Years of education: Not on file    Highest education level: Not on file   Occupational History    Not on file   Tobacco Use    Smoking status: Former     Current packs/day: 0.00     Average packs/day: 0.5 packs/day for 10.0 years (5.0 ttl pk-yrs)     Types: Cigarettes     Start date: 5/1/2004     Quit date: 5/1/2014     Years since

## 2025-02-13 NOTE — PROGRESS NOTES
Patient here for lump on right leg. Reports it has been there for 5 years but increasing in size. Denies pain or drainage.

## 2025-02-13 NOTE — PATIENT INSTRUCTIONS
CT scan is scheduled on Friday February 21st, 2025 at 3 PM- arrive to radiology at 2:45 PM. Nothing to eat or drink 2 hours prior.     Will need to get lab work done at least 1 day prior to scan to check kidney function prior to receiving contrast.    We will call you with the results from the CT scan with Dr. Norwood's recommendations and will schedule you for a follow up after if needed.

## 2025-02-21 ENCOUNTER — HOSPITAL ENCOUNTER (OUTPATIENT)
Dept: CT IMAGING | Age: 80
Discharge: HOME | End: 2025-02-23
Attending: SURGERY
Payer: MEDICARE

## 2025-02-21 DIAGNOSIS — R22.41 SKIN LUMP OF LEG, RIGHT: ICD-10-CM

## 2025-02-21 PROCEDURE — 6360000004 HC RX CONTRAST MEDICATION: Performed by: SURGERY

## 2025-02-21 PROCEDURE — 2709999900 CT TIBIA FIBULA RIGHT W CONTRAST

## 2025-02-21 RX ORDER — IOPAMIDOL 755 MG/ML
75 INJECTION, SOLUTION INTRAVASCULAR
Status: COMPLETED | OUTPATIENT
Start: 2025-02-21 | End: 2025-02-21

## 2025-02-21 RX ADMIN — IOPAMIDOL 75 ML: 755 INJECTION, SOLUTION INTRAVENOUS at 15:27

## 2025-02-25 ENCOUNTER — TELEPHONE (OUTPATIENT)
Dept: SURGERY | Age: 80
End: 2025-02-25

## 2025-02-25 DIAGNOSIS — R93.6 ABNORMAL COMPUTED TOMOGRAPHY OF LOWER EXTREMITY: Primary | ICD-10-CM

## 2025-03-03 DIAGNOSIS — G47.9 SLEEP DISORDER: ICD-10-CM

## 2025-03-03 RX ORDER — ZALEPLON 10 MG/1
10 CAPSULE ORAL NIGHTLY
Qty: 90 CAPSULE | Refills: 0 | Status: SHIPPED | OUTPATIENT
Start: 2025-03-03 | End: 2025-06-01

## 2025-03-03 NOTE — TELEPHONE ENCOUNTER
Brooke called requesting a refill of the below medication which has been pended for you:     Requested Prescriptions     Pending Prescriptions Disp Refills    zaleplon (SONATA) 10 MG capsule 90 capsule 0     Sig: Take 1 capsule by mouth nightly for 90 days. TAKE 1 CAPSULE BY MOUTH EVERY  NIGHT AS NEEDED FOR SLEEP Max Daily Amount: 10 mg       Last Appointment Date: 1/15/2025  Next Appointment Date: 8/19/2025    Allergies   Allergen Reactions    Atorvastatin Other (See Comments)     MUSCLE PAIN/ACHES- all statins cause muscle pain    Crestor [Rosuvastatin]      MUSCLE PAIN/ACHES      Cymbalta [Duloxetine Hcl]      Disorientation, excessive sleepiness    Influenza Vaccines Other (See Comments)     Causes problems with eating eggs  Causes problems with eating eggs    Lipitor      MUSCLE PAIN/ACHES      Meloxicam Other (See Comments)     constipation    Pravastatin      MUSCLE PAIN/ACHES    Prednisone      Lost eyelashes       Red Yeast Rice [Monascus Purpureus Went Yeast]      POORLY TOLERATED    Zetia [Ezetimibe]      CONSTIPATION      Zocor [Simvastatin]      MUSCLE PAIN/ACHES    Floxin [Ofloxacin] Nausea And Vomiting    Gabapentin Rash

## 2025-03-04 ENCOUNTER — HOSPITAL ENCOUNTER (OUTPATIENT)
Age: 80
Discharge: HOME | End: 2025-03-04
Payer: MEDICARE

## 2025-03-04 DIAGNOSIS — D47.2 MGUS (MONOCLONAL GAMMOPATHY OF UNKNOWN SIGNIFICANCE): ICD-10-CM

## 2025-03-04 LAB
ALBUMIN SERPL-MCNC: 4.4 G/DL (ref 3.5–5.2)
ALBUMIN/GLOB SERPL: 1.3 {RATIO} (ref 1–2.5)
ALP SERPL-CCNC: 48 U/L (ref 35–104)
ALT SERPL-CCNC: 14 U/L (ref 5–33)
ANION GAP SERPL CALCULATED.3IONS-SCNC: 11 MMOL/L (ref 9–17)
AST SERPL-CCNC: 19 U/L
BASOPHILS # BLD: 0.05 K/UL (ref 0–0.2)
BASOPHILS NFR BLD: 1 % (ref 0–2)
BILIRUB SERPL-MCNC: 0.4 MG/DL (ref 0.3–1.2)
BUN SERPL-MCNC: 31 MG/DL (ref 8–23)
BUN/CREAT SERPL: 34 (ref 9–20)
CALCIUM SERPL-MCNC: 10.1 MG/DL (ref 8.6–10.4)
CHLORIDE SERPL-SCNC: 99 MMOL/L (ref 98–107)
CO2 SERPL-SCNC: 25 MMOL/L (ref 20–31)
CREAT SERPL-MCNC: 0.9 MG/DL (ref 0.5–0.9)
EOSINOPHIL # BLD: 0.1 K/UL (ref 0–0.44)
EOSINOPHILS RELATIVE PERCENT: 2 % (ref 1–4)
ERYTHROCYTE [DISTWIDTH] IN BLOOD BY AUTOMATED COUNT: 13.3 % (ref 11.8–14.4)
FREE KAPPA/LAMBDA RATIO: 0.47 (ref 0.22–1.74)
GFR, ESTIMATED: 65 ML/MIN/1.73M2
GLUCOSE SERPL-MCNC: 93 MG/DL (ref 70–99)
HCT VFR BLD AUTO: 38.6 % (ref 36.3–47.1)
HGB BLD-MCNC: 12.4 G/DL (ref 11.9–15.1)
IGA SERPL-MCNC: 113 MG/DL (ref 70–400)
IGG SERPL-MCNC: 1578 MG/DL (ref 700–1600)
IGM SERPL-MCNC: 130 MG/DL (ref 40–230)
IMM GRANULOCYTES # BLD AUTO: <0.03 K/UL (ref 0–0.3)
IMM GRANULOCYTES NFR BLD: 0 %
KAPPA LC FREE SER-MCNC: 15.5 MG/L
LAMBDA LC FREE SERPL-MCNC: 33.1 MG/L (ref 4.2–27.7)
LYMPHOCYTES NFR BLD: 1.3 K/UL (ref 1.1–3.7)
LYMPHOCYTES RELATIVE PERCENT: 25 % (ref 24–43)
MCH RBC QN AUTO: 29 PG (ref 25.2–33.5)
MCHC RBC AUTO-ENTMCNC: 32.1 G/DL (ref 25.2–33.5)
MCV RBC AUTO: 90.4 FL (ref 82.6–102.9)
MONOCYTES NFR BLD: 0.61 K/UL (ref 0.1–1.2)
MONOCYTES NFR BLD: 12 % (ref 3–12)
NEUTROPHILS NFR BLD: 60 % (ref 36–65)
NEUTS SEG NFR BLD: 3.23 K/UL (ref 1.5–8.1)
NRBC BLD-RTO: 0 PER 100 WBC
PLATELET # BLD AUTO: 249 K/UL (ref 138–453)
PMV BLD AUTO: 10.6 FL (ref 8.1–13.5)
POTASSIUM SERPL-SCNC: 4.7 MMOL/L (ref 3.7–5.3)
PROT SERPL-MCNC: 7.9 G/DL (ref 6.4–8.3)
RBC # BLD AUTO: 4.27 M/UL (ref 3.95–5.11)
SODIUM SERPL-SCNC: 135 MMOL/L (ref 135–144)
WBC OTHER # BLD: 5.3 K/UL (ref 3.5–11.3)

## 2025-03-04 PROCEDURE — 36415 COLL VENOUS BLD VENIPUNCTURE: CPT

## 2025-03-04 PROCEDURE — 85025 COMPLETE CBC W/AUTO DIFF WBC: CPT

## 2025-03-04 PROCEDURE — 82784 ASSAY IGA/IGD/IGG/IGM EACH: CPT

## 2025-03-04 PROCEDURE — 84165 PROTEIN E-PHORESIS SERUM: CPT

## 2025-03-04 PROCEDURE — 83521 IG LIGHT CHAINS FREE EACH: CPT

## 2025-03-04 PROCEDURE — 80053 COMPREHEN METABOLIC PANEL: CPT

## 2025-03-04 PROCEDURE — 86334 IMMUNOFIX E-PHORESIS SERUM: CPT

## 2025-03-04 PROCEDURE — 84155 ASSAY OF PROTEIN SERUM: CPT

## 2025-03-05 LAB
ALBUMIN PERCENT: 59 % (ref 56–66)
ALBUMIN SERPL-MCNC: 4.5 G/DL (ref 3.2–5.2)
ALPHA 2 PERCENT: 9 % (ref 7–12)
ALPHA1 GLOB SERPL ELPH-MCNC: 0.3 G/DL (ref 0.1–0.4)
ALPHA1 GLOB SERPL ELPH-MCNC: 4 % (ref 3–5)
ALPHA2 GLOB SERPL ELPH-MCNC: 0.7 G/DL (ref 0.5–0.9)
B-GLOBULIN SERPL ELPH-MCNC: 0.8 G/DL (ref 0.7–1.4)
B-GLOBULIN SERPL ELPH-MCNC: 10 % (ref 8–13)
GAMMA GLOB SERPL ELPH-MCNC: 1.4 G/DL (ref 0.5–1.5)
GAMMA GLOBULIN %: 18 % (ref 11–19)
ITYP INTERPRETATION: NORMAL
PATH REV: NORMAL
PATHOLOGIST: NORMAL
PROT PATTERN SERPL ELPH-IMP: NORMAL
PROT SERPL-MCNC: 7.6 G/DL (ref 6.6–8.7)
TOTAL PROT. SUM,%: 100 % (ref 98–102)
TOTAL PROT. SUM: 7.7 G/DL (ref 6.3–8.2)

## 2025-03-10 ENCOUNTER — HOSPITAL ENCOUNTER (OUTPATIENT)
Dept: MAMMOGRAPHY | Age: 80
Discharge: HOME | End: 2025-03-12
Payer: MEDICARE

## 2025-03-10 ENCOUNTER — OFFICE VISIT (OUTPATIENT)
Dept: ONCOLOGY | Age: 80
End: 2025-03-10
Payer: MEDICARE

## 2025-03-10 VITALS
TEMPERATURE: 97 F | WEIGHT: 210.2 LBS | OXYGEN SATURATION: 96 % | SYSTOLIC BLOOD PRESSURE: 124 MMHG | HEIGHT: 63 IN | BODY MASS INDEX: 37.25 KG/M2 | RESPIRATION RATE: 16 BRPM | HEART RATE: 74 BPM | DIASTOLIC BLOOD PRESSURE: 76 MMHG

## 2025-03-10 DIAGNOSIS — D47.2 MGUS (MONOCLONAL GAMMOPATHY OF UNKNOWN SIGNIFICANCE): Primary | ICD-10-CM

## 2025-03-10 DIAGNOSIS — Z12.31 SCREENING MAMMOGRAM FOR BREAST CANCER: ICD-10-CM

## 2025-03-10 PROCEDURE — 1090F PRES/ABSN URINE INCON ASSESS: CPT | Performed by: INTERNAL MEDICINE

## 2025-03-10 PROCEDURE — 1036F TOBACCO NON-USER: CPT | Performed by: INTERNAL MEDICINE

## 2025-03-10 PROCEDURE — 77063 BREAST TOMOSYNTHESIS BI: CPT

## 2025-03-10 PROCEDURE — 1160F RVW MEDS BY RX/DR IN RCRD: CPT | Performed by: INTERNAL MEDICINE

## 2025-03-10 PROCEDURE — G8427 DOCREV CUR MEDS BY ELIG CLIN: HCPCS | Performed by: INTERNAL MEDICINE

## 2025-03-10 PROCEDURE — G8399 PT W/DXA RESULTS DOCUMENT: HCPCS | Performed by: INTERNAL MEDICINE

## 2025-03-10 PROCEDURE — 99212 OFFICE O/P EST SF 10 MIN: CPT | Performed by: INTERNAL MEDICINE

## 2025-03-10 PROCEDURE — 99204 OFFICE O/P NEW MOD 45 MIN: CPT | Performed by: INTERNAL MEDICINE

## 2025-03-10 PROCEDURE — 1159F MED LIST DOCD IN RCRD: CPT | Performed by: INTERNAL MEDICINE

## 2025-03-10 PROCEDURE — G8417 CALC BMI ABV UP PARAM F/U: HCPCS | Performed by: INTERNAL MEDICINE

## 2025-03-10 PROCEDURE — 1123F ACP DISCUSS/DSCN MKR DOCD: CPT | Performed by: INTERNAL MEDICINE

## 2025-03-10 NOTE — PROGRESS NOTES
infiltrating ductal carcinoma of left breast status post lumpectomy and adjuvant radiation therapy.  Only received 1 year of anti-hormonal therapy.  IgG lambda paraproteinemia  neuropathy    Plan:  I reviewed her recent lab work, imaging studies, discussed diagnosis and treatment recommendations   Recent mammogram showed no evidence of recurrence   Recent lab work showed stable monoclonal protein at 0.8 g/dL   Her hemoglobin, creatinine and calcium within normal limit   Continue surveillance   Return to clinic in 1 year with labs 1 week prior or earlier if needed       MD Travis Beard MD  Hematologist/Medical Oncologist    On this date 3/10/25  I have spent 40 minutes reviewing previous notes, test results and face to face with the patient discussing the diagnosis and importance of compliance with the treatment plan. Greater than 50% of that time was spent face-to-face with the patient in counseling and coordinating her care.      This note is created with the assistance of a speech recognition program.  While intending to generate a document that actually reflects the content of the visit, the document can still have some errors including those of syntax and sound a like substitutions which may escape proof reading.  It such instances, actual meaning can be extrapolated by contextual diversion.

## 2025-03-11 ENCOUNTER — RESULTS FOLLOW-UP (OUTPATIENT)
Dept: MAMMOGRAPHY | Age: 80
End: 2025-03-11

## 2025-03-27 ENCOUNTER — TELEPHONE (OUTPATIENT)
Dept: SURGERY | Age: 80
End: 2025-03-27

## 2025-03-27 NOTE — TELEPHONE ENCOUNTER
Patient was referred to orthopedics for a lump on her lower leg. Patient called stating orthopedics stated they do not treat this type of issue and she would need referred somewhere else. Patient also stated the lump is growing. Please advise. Can contact patient at Ph# [phone].

## 2025-03-27 NOTE — TELEPHONE ENCOUNTER
Called and talked with Ortho and Dr Wagner's NP states that they do not see patient with the results of the CT scan.  She has suggested maybe Lymphedema clinic. Attempted to call patient to explain that Dr Norwood is on vacation but no answer or voicemail.    Please advise    2-24-25 CT scan Tibia/ Fibula Result:    IMPRESSION:  1. Soft tissue marker along the anterior proximal right lower leg without  subjacent fluid collection, soft tissue mass or lipoma.  2. Moderate cellulitis or lymphedema of the mid to right lower leg, ankle and  foot.  No organized fluid collection.  3. Moderate fluid in the subcutaneous fat along the anterior knee and  anterior tibial tubercle.  4. Mild degenerative changes as above.  5. Prior right knee arthroplasty and patellar resurfacing, partially  visualized.  6. No acute osseous abnormality.       Last seen in the office on 2-13-25    Patient here for lump on right leg. Reports it has been there for 5 years but increasing in size. Denies pain or drainage.   Is tender sometimes when pushing on it.   Had knee replacement 1-2 yrs ago, but lump was there prior to knee replacement.       ASSESS MENT:     SQ mass anterior right leg 10 cm below knee cap, 8 by 6 cm mass c/w lipoma but tender and not as mobile as some, so ? If truly lipoma, will get CT to see if above muscle fascia and if mass  c/w lipoma        PLAN:      CT right lower leg to evaluate mass to ensure mass in the SQ and it is lipoma.  Then make further recommendations after CT .  Pt would like it removed in the office if possible

## 2025-03-31 NOTE — TELEPHONE ENCOUNTER
Talked with Candice from Dr Wagner's office.  She will be in the office on Tuesday 4-1-25 between [id number]. Call [phone].

## 2025-04-01 NOTE — TELEPHONE ENCOUNTER
Called patient and explained to her that we are working on finding her appointment with ortho. Dr Norwood is going to call the Ortho office and explained to them what he would like done.  Patient verbalized understanding.

## 2025-04-02 NOTE — TELEPHONE ENCOUNTER
Dr Norwood spoke with Ortho and both agree to go back to the Ortho surgeon that did her knee surgery on 1-29-24.  Called patient back and explained this to her. Her surgeon was Dr Artur Van.  She was told that this doctor only does knee replacement.  She had asked if office could call the office.    Will try to get a hold of Dr Van office

## 2025-04-04 NOTE — TELEPHONE ENCOUNTER
Called and spoke to Tova at Dr Van's office regarding patient having fluid around the neck that was replaced back on 1-29-24.  Tova states that they are willing to see patient and  she needs to call for an appointment. Also faxed CT scan  results to their office at [phone].  Also called our Radiology Department they are going to push the images of the CT scan to [facility] and also making CD for patient to . Called and left message to patient regarding this information.     Phone: [phone]

## 2025-06-04 DIAGNOSIS — G47.9 SLEEP DISORDER: ICD-10-CM

## 2025-06-04 RX ORDER — ZALEPLON 10 MG/1
10 CAPSULE ORAL NIGHTLY
Qty: 90 CAPSULE | Refills: 0 | Status: SHIPPED | OUTPATIENT
Start: 2025-06-04 | End: 2025-09-02

## 2025-06-04 NOTE — TELEPHONE ENCOUNTER
Brooke called requesting a refill of the below medication which has been pended for you:     Requested Prescriptions     Pending Prescriptions Disp Refills    zaleplon (SONATA) 10 MG capsule 90 capsule 0     Sig: Take 1 capsule by mouth nightly for 90 days. TAKE 1 CAPSULE BY MOUTH EVERY  NIGHT AS NEEDED FOR SLEEP Max Daily Amount: 10 mg       Last Appointment Date: 1/15/2025  Next Appointment Date: 9/16/2025    Allergies   Allergen Reactions    Atorvastatin Other (See Comments)     MUSCLE PAIN/ACHES- all statins cause muscle pain    Crestor [Rosuvastatin]      MUSCLE PAIN/ACHES      Cymbalta [Duloxetine Hcl]      Disorientation, excessive sleepiness    Influenza Vaccines Other (See Comments)     Causes problems with eating eggs  Causes problems with eating eggs    Lipitor      MUSCLE PAIN/ACHES      Meloxicam Other (See Comments)     constipation    Pravastatin      MUSCLE PAIN/ACHES    Prednisone      Lost eyelashes       Red Yeast Rice [Monascus Purpureus Went Yeast]      POORLY TOLERATED    Zetia [Ezetimibe]      CONSTIPATION      Zocor [Simvastatin]      MUSCLE PAIN/ACHES    Floxin [Ofloxacin] Nausea And Vomiting    Gabapentin Rash

## 2025-06-23 ENCOUNTER — TELEPHONE (OUTPATIENT)
Dept: INTERNAL MEDICINE | Age: 80
End: 2025-06-23

## 2025-06-23 NOTE — TELEPHONE ENCOUNTER
Patient called in and stated she had an MRI of her Right knee that was done on 6/2/25 & was ordered by Orthopedics Carlito OLIVARES from [facility] and she was told her pcp would follow up with her on the results.  The results are in Epic under imaging.    Please call Brooke back with results [phone]

## 2025-06-23 NOTE — TELEPHONE ENCOUNTER
Can we please call the ortho group to see why pt has not been notified of results and if they wish to have a follow up apt- it does not appear consistent with a lipoma so gen surgery will not remove- will need follow up with ortho

## 2025-06-23 NOTE — TELEPHONE ENCOUNTER
Spoke with Tova at [facility], EL[facility] Cu[facility]ra. She reports that pt was not advised to contact PCP for results and Jayson has been out of the office - will return on 6/26/25. Tova will send a message to Jayson for result review and contact the pt.    Attempted to call pt x1 - no answer or vm.

## (undated) DEVICE — GLOVE SURG SZ 8 L12IN THK91MIL BRN LTX FREE POLYCHLOROPRENE

## (undated) DEVICE — CHLORAPREP 26ML CLEAR

## (undated) DEVICE — GLOVE ORANGE PI 8   MSG9080

## (undated) DEVICE — FORCEPS BX L240CM JAW DIA2.4MM ORNG L CAP W/ NDL DISP RAD

## (undated) DEVICE — LINE SAMP O2 6.5FT W/FEMALE CONN F/ADULT CAPNOLINE PLUS

## (undated) DEVICE — BANDAGE ADH DIA7/8IN NAT FLEX-FABRIC WVN FOR WND PROTCT

## (undated) DEVICE — TRAY PAIN CUST

## (undated) DEVICE — CONNECTOR TBNG AUX H2O JET DISP FOR OLY 160/180 SER

## (undated) DEVICE — CANNULA NSL AD L2IN ETCO2 SAMP SFT CRUSH RESIST FEM AIRLFE

## (undated) DEVICE — MERCY DEFIANCE ENDO KIT: Brand: MEDLINE INDUSTRIES, INC.

## (undated) DEVICE — GLOVE ORANGE PI 7   MSG9070

## (undated) DEVICE — 1200CC GUARDIAN II: Brand: GUARDIAN

## (undated) DEVICE — NEEDLE, QUINCKE, 22GX5": Brand: MEDLINE

## (undated) DEVICE — 60 ML SYRINGE REGULAR TIP: Brand: MONOJECT

## (undated) DEVICE — GLOVE SURG SZ 65 THK91MIL LTX FREE SYN POLYISOPRENE